# Patient Record
Sex: FEMALE | Race: WHITE | NOT HISPANIC OR LATINO | URBAN - METROPOLITAN AREA
[De-identification: names, ages, dates, MRNs, and addresses within clinical notes are randomized per-mention and may not be internally consistent; named-entity substitution may affect disease eponyms.]

---

## 2017-07-01 ENCOUNTER — OUTPATIENT (OUTPATIENT)
Dept: OUTPATIENT SERVICES | Facility: HOSPITAL | Age: 77
LOS: 1 days | End: 2017-07-01
Payer: MEDICAID

## 2017-07-01 DIAGNOSIS — Z98.89 OTHER SPECIFIED POSTPROCEDURAL STATES: Chronic | ICD-10-CM

## 2017-07-01 DIAGNOSIS — Z90.710 ACQUIRED ABSENCE OF BOTH CERVIX AND UTERUS: Chronic | ICD-10-CM

## 2017-07-01 DIAGNOSIS — Z98.49 CATARACT EXTRACTION STATUS, UNSPECIFIED EYE: Chronic | ICD-10-CM

## 2017-07-20 DIAGNOSIS — R69 ILLNESS, UNSPECIFIED: ICD-10-CM

## 2017-08-01 PROCEDURE — G9001: CPT

## 2018-11-12 ENCOUNTER — APPOINTMENT (OUTPATIENT)
Dept: GASTROENTEROLOGY | Facility: CLINIC | Age: 78
End: 2018-11-12
Payer: MEDICARE

## 2018-11-12 VITALS
HEART RATE: 66 BPM | OXYGEN SATURATION: 97 % | WEIGHT: 155 LBS | HEIGHT: 63 IN | TEMPERATURE: 98.7 F | BODY MASS INDEX: 27.46 KG/M2 | SYSTOLIC BLOOD PRESSURE: 150 MMHG | DIASTOLIC BLOOD PRESSURE: 90 MMHG

## 2018-11-12 DIAGNOSIS — Z86.010 PERSONAL HISTORY OF COLONIC POLYPS: ICD-10-CM

## 2018-11-12 DIAGNOSIS — Z86.39 PERSONAL HISTORY OF OTHER ENDOCRINE, NUTRITIONAL AND METABOLIC DISEASE: ICD-10-CM

## 2018-11-12 DIAGNOSIS — Z87.898 PERSONAL HISTORY OF OTHER SPECIFIED CONDITIONS: ICD-10-CM

## 2018-11-12 DIAGNOSIS — Z12.11 ENCOUNTER FOR SCREENING FOR MALIGNANT NEOPLASM OF COLON: ICD-10-CM

## 2018-11-12 DIAGNOSIS — K57.30 DIVERTICULOSIS OF LARGE INTESTINE W/OUT PERFORATION OR ABSCESS W/OUT BLEEDING: ICD-10-CM

## 2018-11-12 DIAGNOSIS — Z80.0 FAMILY HISTORY OF MALIGNANT NEOPLASM OF DIGESTIVE ORGANS: ICD-10-CM

## 2018-11-12 DIAGNOSIS — Z86.79 PERSONAL HISTORY OF OTHER DISEASES OF THE CIRCULATORY SYSTEM: ICD-10-CM

## 2018-11-12 DIAGNOSIS — R73.03 PREDIABETES.: ICD-10-CM

## 2018-11-12 PROCEDURE — 99203 OFFICE O/P NEW LOW 30 MIN: CPT

## 2018-11-12 RX ORDER — REPAGLINIDE 2 MG/1
2 TABLET ORAL
Refills: 0 | Status: ACTIVE | COMMUNITY

## 2018-11-12 RX ORDER — SODIUM SULFATE, POTASSIUM SULFATE, MAGNESIUM SULFATE 17.5; 3.13; 1.6 G/ML; G/ML; G/ML
17.5-3.13-1.6 SOLUTION, CONCENTRATE ORAL
Qty: 1 | Refills: 0 | Status: ACTIVE | COMMUNITY
Start: 2018-11-12 | End: 1900-01-01

## 2018-11-12 RX ORDER — ATENOLOL 25 MG/1
25 TABLET ORAL
Refills: 0 | Status: ACTIVE | COMMUNITY

## 2018-11-12 RX ORDER — HYDROCHLOROTHIAZIDE 25 MG/1
25 TABLET ORAL
Refills: 0 | Status: ACTIVE | COMMUNITY

## 2018-11-12 RX ORDER — VALSARTAN 160 MG/1
160 TABLET ORAL
Refills: 0 | Status: ACTIVE | COMMUNITY

## 2018-11-14 ENCOUNTER — APPOINTMENT (OUTPATIENT)
Dept: GASTROENTEROLOGY | Facility: CLINIC | Age: 78
End: 2018-11-14

## 2018-11-30 ENCOUNTER — APPOINTMENT (OUTPATIENT)
Dept: GASTROENTEROLOGY | Facility: AMBULATORY MEDICAL SERVICES | Age: 78
End: 2018-11-30
Payer: MEDICARE

## 2018-11-30 PROCEDURE — 45380 COLONOSCOPY AND BIOPSY: CPT

## 2018-12-04 ENCOUNTER — OTHER (OUTPATIENT)
Age: 78
End: 2018-12-04

## 2020-02-01 ENCOUNTER — OUTPATIENT (OUTPATIENT)
Dept: OUTPATIENT SERVICES | Facility: HOSPITAL | Age: 80
LOS: 1 days | End: 2020-02-01
Payer: MEDICARE

## 2020-02-01 DIAGNOSIS — Z98.89 OTHER SPECIFIED POSTPROCEDURAL STATES: Chronic | ICD-10-CM

## 2020-02-01 DIAGNOSIS — Z90.710 ACQUIRED ABSENCE OF BOTH CERVIX AND UTERUS: Chronic | ICD-10-CM

## 2020-02-01 DIAGNOSIS — Z98.49 CATARACT EXTRACTION STATUS, UNSPECIFIED EYE: Chronic | ICD-10-CM

## 2020-02-01 PROCEDURE — G9001: CPT

## 2020-02-10 ENCOUNTER — INPATIENT (INPATIENT)
Facility: HOSPITAL | Age: 80
LOS: 18 days | DRG: 64 | End: 2020-02-29
Attending: INTERNAL MEDICINE | Admitting: PSYCHIATRY & NEUROLOGY
Payer: MEDICARE

## 2020-02-10 ENCOUNTER — INPATIENT (INPATIENT)
Facility: HOSPITAL | Age: 80
LOS: 0 days | Discharge: HOME | DRG: 66 | End: 2020-02-10
Attending: HOSPITALIST | Admitting: HOSPITALIST
Payer: MEDICARE

## 2020-02-10 VITALS
DIASTOLIC BLOOD PRESSURE: 65 MMHG | RESPIRATION RATE: 20 BRPM | SYSTOLIC BLOOD PRESSURE: 155 MMHG | OXYGEN SATURATION: 99 % | HEART RATE: 100 BPM

## 2020-02-10 VITALS
WEIGHT: 156.53 LBS | RESPIRATION RATE: 16 BRPM | TEMPERATURE: 98 F | HEIGHT: 63 IN | DIASTOLIC BLOOD PRESSURE: 88 MMHG | OXYGEN SATURATION: 96 % | HEART RATE: 93 BPM | SYSTOLIC BLOOD PRESSURE: 195 MMHG

## 2020-02-10 VITALS — HEIGHT: 63 IN | WEIGHT: 156.09 LBS

## 2020-02-10 DIAGNOSIS — I63.312 CEREBRAL INFARCTION DUE TO THROMBOSIS OF LEFT MIDDLE CEREBRAL ARTERY: ICD-10-CM

## 2020-02-10 DIAGNOSIS — Z90.710 ACQUIRED ABSENCE OF BOTH CERVIX AND UTERUS: Chronic | ICD-10-CM

## 2020-02-10 DIAGNOSIS — Z98.89 OTHER SPECIFIED POSTPROCEDURAL STATES: Chronic | ICD-10-CM

## 2020-02-10 DIAGNOSIS — I10 ESSENTIAL (PRIMARY) HYPERTENSION: ICD-10-CM

## 2020-02-10 DIAGNOSIS — Z98.49 CATARACT EXTRACTION STATUS, UNSPECIFIED EYE: Chronic | ICD-10-CM

## 2020-02-10 DIAGNOSIS — E11.9 TYPE 2 DIABETES MELLITUS WITHOUT COMPLICATIONS: ICD-10-CM

## 2020-02-10 DIAGNOSIS — Z71.89 OTHER SPECIFIED COUNSELING: ICD-10-CM

## 2020-02-10 LAB
ALBUMIN SERPL ELPH-MCNC: 4.1 G/DL — SIGNIFICANT CHANGE UP (ref 3.3–5)
ALP SERPL-CCNC: 84 U/L — SIGNIFICANT CHANGE UP (ref 30–120)
ALT FLD-CCNC: 50 U/L DA — SIGNIFICANT CHANGE UP (ref 10–60)
ANION GAP SERPL CALC-SCNC: 14 MMOL/L — SIGNIFICANT CHANGE UP (ref 5–17)
APTT BLD: 28.2 SEC — LOW (ref 28.5–37)
AST SERPL-CCNC: 31 U/L — SIGNIFICANT CHANGE UP (ref 10–40)
BASE EXCESS BLDA CALC-SCNC: 3.5 MMOL/L — HIGH (ref -2–2)
BASOPHILS # BLD AUTO: 0.03 K/UL — SIGNIFICANT CHANGE UP (ref 0–0.2)
BASOPHILS NFR BLD AUTO: 0.2 % — SIGNIFICANT CHANGE UP (ref 0–2)
BILIRUB SERPL-MCNC: 0.6 MG/DL — SIGNIFICANT CHANGE UP (ref 0.2–1.2)
BLOOD GAS SOURCE: SIGNIFICANT CHANGE UP
BUN SERPL-MCNC: 24 MG/DL — HIGH (ref 7–23)
CALCIUM SERPL-MCNC: 9 MG/DL — SIGNIFICANT CHANGE UP (ref 8.4–10.5)
CHLORIDE SERPL-SCNC: 104 MMOL/L — SIGNIFICANT CHANGE UP (ref 96–108)
CO2 SERPL-SCNC: 23 MMOL/L — SIGNIFICANT CHANGE UP (ref 22–31)
CREAT SERPL-MCNC: 0.8 MG/DL — SIGNIFICANT CHANGE UP (ref 0.5–1.3)
EOSINOPHIL # BLD AUTO: 0 K/UL — SIGNIFICANT CHANGE UP (ref 0–0.5)
EOSINOPHIL NFR BLD AUTO: 0 % — SIGNIFICANT CHANGE UP (ref 0–6)
ETHANOL SERPL-MCNC: <3 MG/DL — SIGNIFICANT CHANGE UP (ref 0–3)
GAS PNL BLDV: SIGNIFICANT CHANGE UP
GLUCOSE BLDC GLUCOMTR-MCNC: 164 MG/DL — HIGH (ref 70–99)
GLUCOSE BLDC GLUCOMTR-MCNC: 208 MG/DL — HIGH (ref 70–99)
GLUCOSE BLDC GLUCOMTR-MCNC: 223 MG/DL — HIGH (ref 70–99)
GLUCOSE SERPL-MCNC: 210 MG/DL — HIGH (ref 70–99)
HCO3 BLDA-SCNC: 22 MMOL/L — SIGNIFICANT CHANGE UP (ref 21–29)
HCO3 BLDV-SCNC: 24 MMOL/L — SIGNIFICANT CHANGE UP (ref 21–29)
HCT VFR BLD CALC: 45.5 % — HIGH (ref 34.5–45)
HGB BLD-MCNC: 14.5 G/DL — SIGNIFICANT CHANGE UP (ref 11.5–15.5)
HOROWITZ INDEX BLDA+IHG-RTO: 50 — SIGNIFICANT CHANGE UP
IMM GRANULOCYTES NFR BLD AUTO: 0.3 % — SIGNIFICANT CHANGE UP (ref 0–1.5)
INR BLD: 1.05 RATIO — SIGNIFICANT CHANGE UP (ref 0.88–1.16)
LYMPHOCYTES # BLD AUTO: 0.98 K/UL — LOW (ref 1–3.3)
LYMPHOCYTES # BLD AUTO: 6.8 % — LOW (ref 13–44)
MCHC RBC-ENTMCNC: 28.1 PG — SIGNIFICANT CHANGE UP (ref 27–34)
MCHC RBC-ENTMCNC: 31.9 GM/DL — LOW (ref 32–36)
MCV RBC AUTO: 88.2 FL — SIGNIFICANT CHANGE UP (ref 80–100)
MONOCYTES # BLD AUTO: 0.39 K/UL — SIGNIFICANT CHANGE UP (ref 0–0.9)
MONOCYTES NFR BLD AUTO: 2.7 % — SIGNIFICANT CHANGE UP (ref 2–14)
NEUTROPHILS # BLD AUTO: 13 K/UL — HIGH (ref 1.8–7.4)
NEUTROPHILS NFR BLD AUTO: 90 % — HIGH (ref 43–77)
NRBC # BLD: 0 /100 WBCS — SIGNIFICANT CHANGE UP (ref 0–0)
PCO2 BLDA: 36 MMHG — SIGNIFICANT CHANGE UP (ref 32–46)
PCO2 BLDV: 39 MMHG — SIGNIFICANT CHANGE UP (ref 35–50)
PH BLD: 7.38 — SIGNIFICANT CHANGE UP (ref 7.35–7.45)
PH BLDV: 7.4 — SIGNIFICANT CHANGE UP (ref 7.35–7.45)
PLATELET # BLD AUTO: 206 K/UL — SIGNIFICANT CHANGE UP (ref 150–400)
PO2 BLDA: 108 MMHG — SIGNIFICANT CHANGE UP (ref 74–108)
PO2 BLDV: 32 MMHG — SIGNIFICANT CHANGE UP (ref 25–45)
POTASSIUM SERPL-MCNC: 3.6 MMOL/L — SIGNIFICANT CHANGE UP (ref 3.5–5.3)
POTASSIUM SERPL-SCNC: 3.6 MMOL/L — SIGNIFICANT CHANGE UP (ref 3.5–5.3)
PROT SERPL-MCNC: 8 G/DL — SIGNIFICANT CHANGE UP (ref 6–8.3)
PROTHROM AB SERPL-ACNC: 11.7 SEC — SIGNIFICANT CHANGE UP (ref 10–12.9)
RBC # BLD: 5.16 M/UL — SIGNIFICANT CHANGE UP (ref 3.8–5.2)
RBC # FLD: 12.9 % — SIGNIFICANT CHANGE UP (ref 10.3–14.5)
SAO2 % BLDA: 98 % — HIGH (ref 92–96)
SAO2 % BLDV: 59 % — LOW (ref 67–88)
SODIUM SERPL-SCNC: 141 MMOL/L — SIGNIFICANT CHANGE UP (ref 135–145)
T3 SERPL-MCNC: 111 NG/DL — SIGNIFICANT CHANGE UP (ref 80–200)
T4 AB SER-ACNC: 6.3 UG/DL — SIGNIFICANT CHANGE UP (ref 4.6–12)
TROPONIN I SERPL-MCNC: 0.01 NG/ML — LOW (ref 0.02–0.06)
TSH SERPL-MCNC: 0.32 UIU/ML — SIGNIFICANT CHANGE UP (ref 0.27–4.2)
WBC # BLD: 14.45 K/UL — HIGH (ref 3.8–10.5)
WBC # FLD AUTO: 14.45 K/UL — HIGH (ref 3.8–10.5)

## 2020-02-10 PROCEDURE — 82962 GLUCOSE BLOOD TEST: CPT

## 2020-02-10 PROCEDURE — 70498 CT ANGIOGRAPHY NECK: CPT | Mod: 26

## 2020-02-10 PROCEDURE — 99291 CRITICAL CARE FIRST HOUR: CPT | Mod: GC

## 2020-02-10 PROCEDURE — 70450 CT HEAD/BRAIN W/O DYE: CPT

## 2020-02-10 PROCEDURE — 84484 ASSAY OF TROPONIN QUANT: CPT

## 2020-02-10 PROCEDURE — 84480 ASSAY TRIIODOTHYRONINE (T3): CPT

## 2020-02-10 PROCEDURE — 71045 X-RAY EXAM CHEST 1 VIEW: CPT | Mod: 26

## 2020-02-10 PROCEDURE — 94002 VENT MGMT INPAT INIT DAY: CPT

## 2020-02-10 PROCEDURE — 84443 ASSAY THYROID STIM HORMONE: CPT

## 2020-02-10 PROCEDURE — 85027 COMPLETE CBC AUTOMATED: CPT

## 2020-02-10 PROCEDURE — 99291 CRITICAL CARE FIRST HOUR: CPT

## 2020-02-10 PROCEDURE — 84436 ASSAY OF TOTAL THYROXINE: CPT

## 2020-02-10 PROCEDURE — 80053 COMPREHEN METABOLIC PANEL: CPT

## 2020-02-10 PROCEDURE — 93880 EXTRACRANIAL BILAT STUDY: CPT | Mod: 26

## 2020-02-10 PROCEDURE — 36415 COLL VENOUS BLD VENIPUNCTURE: CPT

## 2020-02-10 PROCEDURE — 70496 CT ANGIOGRAPHY HEAD: CPT | Mod: 26

## 2020-02-10 PROCEDURE — 85610 PROTHROMBIN TIME: CPT

## 2020-02-10 PROCEDURE — 71045 X-RAY EXAM CHEST 1 VIEW: CPT

## 2020-02-10 PROCEDURE — 85730 THROMBOPLASTIN TIME PARTIAL: CPT

## 2020-02-10 PROCEDURE — 71045 X-RAY EXAM CHEST 1 VIEW: CPT | Mod: 26,77

## 2020-02-10 PROCEDURE — 80307 DRUG TEST PRSMV CHEM ANLYZR: CPT

## 2020-02-10 PROCEDURE — 70498 CT ANGIOGRAPHY NECK: CPT

## 2020-02-10 PROCEDURE — 99285 EMERGENCY DEPT VISIT HI MDM: CPT

## 2020-02-10 PROCEDURE — 70496 CT ANGIOGRAPHY HEAD: CPT

## 2020-02-10 PROCEDURE — 82803 BLOOD GASES ANY COMBINATION: CPT

## 2020-02-10 PROCEDURE — 93880 EXTRACRANIAL BILAT STUDY: CPT

## 2020-02-10 PROCEDURE — 70450 CT HEAD/BRAIN W/O DYE: CPT | Mod: 26,59

## 2020-02-10 RX ORDER — FENTANYL CITRATE 50 UG/ML
100 INJECTION INTRAVENOUS ONCE
Refills: 0 | Status: DISCONTINUED | OUTPATIENT
Start: 2020-02-10 | End: 2020-02-10

## 2020-02-10 RX ORDER — CHOLECALCIFEROL (VITAMIN D3) 125 MCG
1 CAPSULE ORAL
Qty: 0 | Refills: 0 | DISCHARGE

## 2020-02-10 RX ORDER — LABETALOL HCL 100 MG
10 TABLET ORAL EVERY 6 HOURS
Refills: 0 | Status: DISCONTINUED | OUTPATIENT
Start: 2020-02-10 | End: 2020-02-10

## 2020-02-10 RX ORDER — VALSARTAN 80 MG/1
1 TABLET ORAL
Qty: 0 | Refills: 0 | DISCHARGE

## 2020-02-10 RX ORDER — DEXMEDETOMIDINE HYDROCHLORIDE IN 0.9% SODIUM CHLORIDE 4 UG/ML
0.2 INJECTION INTRAVENOUS
Qty: 200 | Refills: 0 | Status: DISCONTINUED | OUTPATIENT
Start: 2020-02-10 | End: 2020-02-12

## 2020-02-10 RX ORDER — INSULIN LISPRO 100/ML
VIAL (ML) SUBCUTANEOUS
Refills: 0 | Status: DISCONTINUED | OUTPATIENT
Start: 2020-02-10 | End: 2020-02-10

## 2020-02-10 RX ORDER — ENOXAPARIN SODIUM 100 MG/ML
40 INJECTION SUBCUTANEOUS DAILY
Refills: 0 | Status: DISCONTINUED | OUTPATIENT
Start: 2020-02-10 | End: 2020-02-10

## 2020-02-10 RX ORDER — ETOMIDATE 2 MG/ML
20 INJECTION INTRAVENOUS ONCE
Refills: 0 | Status: COMPLETED | OUTPATIENT
Start: 2020-02-10 | End: 2020-02-10

## 2020-02-10 RX ORDER — METOPROLOL TARTRATE 50 MG
1 TABLET ORAL
Qty: 0 | Refills: 0 | DISCHARGE

## 2020-02-10 RX ORDER — DEXTROSE 50 % IN WATER 50 %
15 SYRINGE (ML) INTRAVENOUS ONCE
Refills: 0 | Status: DISCONTINUED | OUTPATIENT
Start: 2020-02-10 | End: 2020-02-10

## 2020-02-10 RX ORDER — DEXTROSE 50 % IN WATER 50 %
25 SYRINGE (ML) INTRAVENOUS ONCE
Refills: 0 | Status: DISCONTINUED | OUTPATIENT
Start: 2020-02-10 | End: 2020-02-10

## 2020-02-10 RX ORDER — HEPARIN SODIUM 5000 [USP'U]/ML
5000 INJECTION INTRAVENOUS; SUBCUTANEOUS EVERY 12 HOURS
Refills: 0 | Status: DISCONTINUED | OUTPATIENT
Start: 2020-02-11 | End: 2020-02-10

## 2020-02-10 RX ORDER — GLUCAGON INJECTION, SOLUTION 0.5 MG/.1ML
1 INJECTION, SOLUTION SUBCUTANEOUS ONCE
Refills: 0 | Status: DISCONTINUED | OUTPATIENT
Start: 2020-02-10 | End: 2020-02-10

## 2020-02-10 RX ORDER — SODIUM CHLORIDE 9 MG/ML
1000 INJECTION, SOLUTION INTRAVENOUS
Refills: 0 | Status: DISCONTINUED | OUTPATIENT
Start: 2020-02-10 | End: 2020-02-10

## 2020-02-10 RX ORDER — ASPIRIN/CALCIUM CARB/MAGNESIUM 324 MG
300 TABLET ORAL DAILY
Refills: 0 | Status: DISCONTINUED | OUTPATIENT
Start: 2020-02-10 | End: 2020-02-10

## 2020-02-10 RX ORDER — FENTANYL CITRATE 50 UG/ML
50 INJECTION INTRAVENOUS ONCE
Refills: 0 | Status: DISCONTINUED | OUTPATIENT
Start: 2020-02-10 | End: 2020-02-10

## 2020-02-10 RX ORDER — CHLORHEXIDINE GLUCONATE 213 G/1000ML
1 SOLUTION TOPICAL
Refills: 0 | Status: DISCONTINUED | OUTPATIENT
Start: 2020-02-10 | End: 2020-02-15

## 2020-02-10 RX ORDER — LABETALOL HCL 100 MG
10 TABLET ORAL ONCE
Refills: 0 | Status: COMPLETED | OUTPATIENT
Start: 2020-02-10 | End: 2020-02-10

## 2020-02-10 RX ORDER — ACETAMINOPHEN 500 MG
650 TABLET ORAL EVERY 6 HOURS
Refills: 0 | Status: DISCONTINUED | OUTPATIENT
Start: 2020-02-10 | End: 2020-02-10

## 2020-02-10 RX ORDER — FENTANYL CITRATE 50 UG/ML
50 INJECTION INTRAVENOUS
Refills: 0 | Status: DISCONTINUED | OUTPATIENT
Start: 2020-02-10 | End: 2020-02-10

## 2020-02-10 RX ORDER — SODIUM CHLORIDE 9 MG/ML
1000 INJECTION INTRAMUSCULAR; INTRAVENOUS; SUBCUTANEOUS
Refills: 0 | Status: DISCONTINUED | OUTPATIENT
Start: 2020-02-10 | End: 2020-02-10

## 2020-02-10 RX ORDER — CHLORHEXIDINE GLUCONATE 213 G/1000ML
15 SOLUTION TOPICAL EVERY 12 HOURS
Refills: 0 | Status: DISCONTINUED | OUTPATIENT
Start: 2020-02-10 | End: 2020-02-16

## 2020-02-10 RX ORDER — DEXTROSE 50 % IN WATER 50 %
12.5 SYRINGE (ML) INTRAVENOUS ONCE
Refills: 0 | Status: DISCONTINUED | OUTPATIENT
Start: 2020-02-10 | End: 2020-02-10

## 2020-02-10 RX ORDER — REPAGLINIDE 1 MG/1
1 TABLET ORAL
Qty: 0 | Refills: 0 | DISCHARGE

## 2020-02-10 RX ADMIN — Medication 4: at 19:08

## 2020-02-10 RX ADMIN — Medication 10 MILLIGRAM(S): at 17:49

## 2020-02-10 RX ADMIN — Medication 300 MILLIGRAM(S): at 19:08

## 2020-02-10 RX ADMIN — FENTANYL CITRATE 50 MICROGRAM(S): 50 INJECTION INTRAVENOUS at 22:40

## 2020-02-10 RX ADMIN — ENOXAPARIN SODIUM 40 MILLIGRAM(S): 100 INJECTION SUBCUTANEOUS at 17:49

## 2020-02-10 RX ADMIN — FENTANYL CITRATE 50 MICROGRAM(S): 50 INJECTION INTRAVENOUS at 22:25

## 2020-02-10 RX ADMIN — ETOMIDATE 20 MILLIGRAM(S): 2 INJECTION INTRAVENOUS at 22:13

## 2020-02-10 RX ADMIN — SODIUM CHLORIDE 50 MILLILITER(S): 9 INJECTION INTRAMUSCULAR; INTRAVENOUS; SUBCUTANEOUS at 17:43

## 2020-02-10 NOTE — PROGRESS NOTE ADULT - ASSESSMENT
88 yo F with  PMH of DM2 HTN ,HLD, S/P cholecystectomy hip surg, sent to ER at  for episode of nonresponsiveness. CTA STROKE protocol significant for acute left MCA infarction with hyperdense left MCA sign. Now being transferred to SPCU for higher level of care      Acute left sided MCA stroke  Out of TPA window  Case discussed with Critical Care attending and Neurology. High risk of hemorrhagic deterioration.  At this time  Frequent neuro checks q1hr  Iv fluids with only normal saline  speech swallow when responsive  Tele  Echo with bubble  PT eval  Carotid duplex  Will repeat CT head in the am to assess for Stroke evolution  Low low threshold for repeat CT head     Problem/Plan - 2:  ·  Problem: Diabetes.  Plan: ssi.      Problem/Plan - 3:  ·  Problem: Hypertension.  Plan: labetalol prn , keep bp around 180.      Problem/Plan - 4:  ·  Problem: Goals of care, counseling/discussion.  Plan: discussed with daughter, full code for now, poor prognosis discussed.       Critical Care sara   ~Greater than 50 minute spent with critical care on  patient encounter, activities included direct patient care, counceling and or coordinating care , reviewing notes, lab data/imaging, and discussion with multidiscplinary team    Patient is high risk of neurlogical deteroriation and hemorrhagic transsformation and will need to be in the SPCU for frequent neuro checks  patient has low threshold for Repeat CT head.  will repeat CT in 24 hours  out of TPA window 88 yo F with  PMH of DM2 HTN ,HLD, S/P cholecystectomy hip surg, sent to ER at  for episode of nonresponsiveness. CTA STROKE protocol significant for acute left MCA infarction with hyperdense left MCA sign. Now being transferred to SPCU for higher level of care      Acute left sided MCA stroke  Out of TPA window  Case discussed with Critical Care attending and Neurology. High risk of hemorrhagic deterioration.  At this time  Frequent neuro checks q1hr  Iv fluids with only normal saline  speech swallow when responsive  Tele  Echo with bubble  PT eval  Carotid duplex  CTA now  Low low threshold for repeat CT head     Problem/Plan - 2:  ·  Problem: Diabetes.  Plan: ssi.      Problem/Plan - 3:  ·  Problem: Hypertension.  Plan: labetalol prn , keep bp around 180.      Problem/Plan - 4:  ·  Problem: Goals of care, counseling/discussion.  Plan: discussed with daughter, full code for now, poor prognosis discussed.     Called [place] ED via [facility]. Spoke to DR. Hi.in Neuro ICU. wasn't impressed with CT findings. did not accept transfer at this time.    Called [facility];  spoke to vascular neurology Dr. Pritchard.  Discussed their are no beds at this time in hospitals  or [place]. will need [facility] icu. transfer    Medical director contacted via CTC. reviewing case now  will call  dr maria with update  Critical Care time     ~Greater than 50 minute spent with critical care on  patient encounter, activities included direct patient care, counceling and or coordinating care , reviewing notes, lab data/imaging, and discussion with multidiscplinary team    Patient is high risk of neurological deteroriation and hemorrhagic transformation and will need to be in the SPCU for frequent neuro checks  followup CTA head and neck    out of TPA window

## 2020-02-10 NOTE — H&P ADULT - NSICDXFAMILYHX_GEN_ALL_CORE_FT
FAMILY HISTORY:  Family history of borderline diabetes mellitus  Family history of CVA    Sibling  Still living? No  Family history of colon cancer, Age at diagnosis: Age Unknown

## 2020-02-10 NOTE — H&P ADULT - NSICDXPASTMEDICALHX_GEN_ALL_CORE_FT
PAST MEDICAL HISTORY:  Chronic bronchitis, simple     Diabetes fbs  104-124    Dyslipidemia     Gallbladder polyp     Hypertension

## 2020-02-10 NOTE — ED ADULT NURSE NOTE - OBJECTIVE STATEMENT
Presents to ED via amb from home. Pt's daughter last spoke with pt @ 9pm last night. Daughter unable to reach her today. Left work & arrived at her apt @ 4pm. Found her on the floor next to bed unresponsive. Upon arrival to ED pt is unresponsive with minimal movement in left arm- all other extremities are flaccid. Forehead reddened from fall? Daughter states she didn't have that last night. Nose red & scaly- basal cell carcinoma

## 2020-02-10 NOTE — PROVIDER CONTACT NOTE (EICU) - RECOMMENDATIONS
- okay to use etomidate +/- propofol  - recommended keeping BP goal <185/110   - recommended ABG post intubation prior to transfer

## 2020-02-10 NOTE — H&P ADULT - NSICDXPASTSURGICALHX_GEN_ALL_CORE_FT
PAST SURGICAL HISTORY:  S/P breast biopsy, right 1985    S/P cataract extraction, unspecified laterality bilateral    S/P colonoscopy 2013    S/P hysterectomy with oophorectomy due to myoma 1994

## 2020-02-10 NOTE — H&P ADULT - HISTORY OF PRESENT ILLNESS
89 yr old Female who lives alone in [place] and self reliant with PMH of DM2 HTN ,HLD, S/P cholecystectomy hip surg, was last seen by daughter at 4 pm on 2/9, and she talked to mother at 9.30 pm , today pt was not responding and daughter found her unresponsive at 4 pm,and adalgisa in to Ed where unresponsive, eyes closed, not moving any extremeties , does not respond to voice commands, slight movement of rt leg ,  < from: CT Brain Stroke Protocol (02.10.20 @ 16:58) >  Acute left MCA distribution infarction with an associated hyperdense left MCA sign. No hemorrhagic transformation.    < end of copied text >

## 2020-02-10 NOTE — H&P ADULT - ATTENDING COMMENTS
I have discussed care plan HCP,expressed understanding of problems treatment and their effect and side effects, alternatives in detail,I have asked if they have any questions and concerns and appropriately addressed them to best of my ability  Reviewed all diagonostic tests, lab results and drug drug interactions, and medications  80 minutes spent on this visit, 50% visit time spent in care co-ordination with other attendings and counselling patient

## 2020-02-10 NOTE — ED PROVIDER NOTE - PSH
S/P breast biopsy, right  1985  S/P cataract extraction, unspecified laterality  bilateral  S/P colonoscopy  2013  S/P hysterectomy with oophorectomy  due to myoma 1994

## 2020-02-10 NOTE — CHART NOTE - NSCHARTNOTEFT_GEN_A_CORE
Called to bedside to assist with intubation   Pt has left sided MCA stroke  out of window for TPA as symptoms started yesterday   currently GCS 4  Pt high risk for airway compromise during transport   Decision was made to intubate prior to transport   Case d/w Dr. Ibarra who spoke with Dr. Valdes   Intubation performed with glidescope (see procedure note)  started on low dose propofol infusion for sedation as patient moving left side   fentanyl pushes prn ordered   D/w EMS goal to keep    cautious with sedation related hypotension   post x-ray and abg reviewed  Case d/w eICU and family who agrees Called to bedside to assist with intubation   Pt with left sided MCA stroke, now with expanding edema, to be transferred to [facility]  out of window for TPA as unknown when symptoms began (some time yesterday last known normal)  currently GCS 4  Pt high risk for airway compromise during transport   Decision was made to intubate prior to transport   Case d/w Dr. Ibarra, who spoke with Dr. Valdes and eICU  Intubation performed with glidescope (see procedure note)  started on low dose propofol infusion for sedation as patient moving left side   fentanyl pushes prn ordered   D/w EMS goal to keep    cautious with sedation related hypotension   post x-ray and abg reviewed  Updated family who agrees Called to bedside to assist with intubation   Pt with left sided MCA stroke  out of window for TPA as unknown when symptoms began (some time yesterday last known normal)  CTA head now with maturing left MCA infarct, expanding edema, mass effect   To be transferred to [facility]  currently GCS 4  Pt high risk for airway compromise during transport   Decision was made to intubate prior to transport   Case d/w Dr. Ibarra, who spoke with Dr. Valdes and eICU  Intubation performed with glidescope (see procedure note)  started on low dose propofol infusion for sedation as patient moving left side   fentanyl pushes prn ordered   D/w EMS goal to keep    cautious with sedation related hypotension   post x-ray and abg reviewed  Updated family who agrees

## 2020-02-10 NOTE — ED PROVIDER NOTE - PROGRESS NOTE DETAILS
Gifty COKER for ED attending, Dr. Menendez : Radiologist reports CT shows left MCA infarct, no bleed.

## 2020-02-10 NOTE — H&P ADULT - HISTORY OF PRESENT ILLNESS
79 year old female with PMH of HTN, DM, HLD presents to the [facility] ED as a stroke transfer. She initially presented to [facility]. Her daughter found her unresponsive at 4pm with Right sided weakness. LKN was 9:30 pm on 2/9. At baseline, she is independent and lives alone. At [facility], she was found to have right arm plegia and was globally aphasic. CT head revealed dense L MCA sign and acute L MCA infarction. CTA h/n revealed a L M1 occlusion. At [facility], her GCS fell from 8 to 4 and patient was intubated. No TPA given and no thrombectomy offered due to outside window.  NIHSS 19. MRS 0.

## 2020-02-10 NOTE — H&P ADULT - NSICDXPASTMEDICALHX_GEN_ALL_CORE_FT
PAST MEDICAL HISTORY:  Basal cell carcinoma nose    Chronic bronchitis, simple     Diabetes fbs  104-124    Dyslipidemia     Gallbladder polyp     Hypertension

## 2020-02-10 NOTE — H&P ADULT - ASSESSMENT
78 year old female presented with unresponsiveness at home. At [place] she was found to have R hemiparesis and global aphasia, consistent with a L MCA syndrome. Her CT head reveals an acute L MCA infarction. 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 Not a candidate for IV tPA due to out of window.     PLAN:  -stroke neurology consulted- Not a candidate for thrombectomy given large core infarct on CT head.  -stroke work-up/core measures (HgA1c, lipid panel, TTE w/ bubble study)  -repeat CT head pending  -ASA for secondary stroke prevention if no hemorrhage on CT head  -start atorvastatin 80mg  -video EEG to r/o subclinical seizures  -wean to extubate, CPAP as tolerated  -SBP goal 120-180  -trend CK since found down for unknown time  -MRI brain w/ contrast at some point once stable   - SCDs, start SQL if CT head shows no heme  -Right wrist X-ray ordered to r/o fracture (swelling and ecchymosis)

## 2020-02-10 NOTE — H&P ADULT - PROBLEM SELECTOR PLAN 1
Iv fluids, neuro consult, speech swallow when responsive, npo, neuro checks, upgrade to spcu if needed intensive care consult

## 2020-02-10 NOTE — ED ADULT NURSE NOTE - NSIMPLEMENTINTERV_GEN_ALL_ED
Implemented All Fall Risk Interventions:  [place] to call system. Call bell, personal items and telephone within reach. Instruct patient to call for assistance. Room bathroom lighting operational. Non-slip footwear when patient is off stretcher. Physically safe environment: no spills, clutter or unnecessary equipment. Stretcher in lowest position, wheels locked, appropriate side rails in place. Provide visual cue, wrist band, yellow gown, etc. Monitor gait and stability. Monitor for mental status changes and reorient to person, place, and time. Review medications for side effects contributing to fall risk. Reinforce activity limits and safety measures with patient and family.

## 2020-02-10 NOTE — ED PROVIDER NOTE - OBJECTIVE STATEMENT
79F with hx of HTN / HLD presents today after found down. Last known normal was 2/9 4pm. Today, was found down 79F with hx of HTN / HLD presents today after found down. Last known normal was  4pm. Today, was found down    Attendinyo female presents with CVA as transfer from other hospital.  pt intubated prior to arrival.  was found down today by family.

## 2020-02-10 NOTE — ED PROVIDER NOTE - CLINICAL SUMMARY MEDICAL DECISION MAKING FREE TEXT BOX
massive cva at outside hospital.  will consult neuro immediately for disposition and further management.  will admit to ICU.

## 2020-02-10 NOTE — ED PROVIDER NOTE - PMH
Chronic bronchitis, simple    Diabetes  fbs  104-124  Dyslipidemia    Gallbladder polyp    Hypertension

## 2020-02-10 NOTE — CONSULT NOTE ADULT - ASSESSMENT
Impression: She presented with unresponsiveness at home. At [place] she was found to have R hemiparesis and global aphasia, consistent with a L MCA syndrome. Her CT head reveals an acute L MCA infarction. 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      She was not a candidate for IV tPA because she was out of the window.  She was not a candidate for thrombectomy given large core infarct on CT head.    Plan:  - Aspirin for secondary stroke prevention  - Agree with pharmacological DVT prophylaxis   - Atorvastatin 80 mg at bedtime once able to pass the swallow evaluation or enteral access is established; titrate the dose according to LDL  - HbA1C and LDL  - Permissive HTN up to 220/110 mmHg for first 48-72 hours from symptom onset followed by gradual normotension  - repeat CT head in AM 2/11  - MRI brain w/o contrast to be considered once patient is stable and depending upon GOC discussion  - TTE with bubble study and continue with telemetry to screen for possible cardiac source of embolism  - loop recorder depending upon hospital course  - GOC discussion

## 2020-02-10 NOTE — H&P ADULT - NSICDXFAMILYHX_GEN_ALL_CORE_FT
FAMILY HISTORY:  Father  Still living? Unknown  Family history of borderline diabetes mellitus, Age at diagnosis: Age Unknown    Mother  Still living? Unknown  Family history of CVA, Age at diagnosis: Age Unknown    Sibling  Still living? No  Family history of colon cancer, Age at diagnosis: Age Unknown

## 2020-02-10 NOTE — CHART NOTE - NSCHARTNOTEFT_GEN_A_CORE
Patient accepted to [place], possibly to stroke unit.  Accepted by Dr. Oliveira at [place].  Family aware and agrees.

## 2020-02-10 NOTE — CONSULT NOTE ADULT - ASSESSMENT
Seen for unresponsiveness.  Has right sided weakness and global aphasia.  NIHSS couldn't be assessed accurately.  CT Head showed -  Acute left MCA distribution infarction with an associated hyperdense left MCA sign. No hemorrhagic transformation.  Not a tPa candidate  - Last well  known is last night. CVA already shown on CT Head  Admit to SPCU.   mg OH  CTA head and neck.   Although there is Dense MCA sign on left MCA. Pt is NOT a candidate for any intervention as Infract is already seen.  MRI Brain/Carotid doppler/2D Echo.  Lipid panel/HbA1c.  Failed dysphagia eval in ED. NPO  PT Eval/Speech and swallowing eval.  D/w ED physician Dr. Menendez.  D/w daughter/grand daughter at bedside. Questions answered.  Would continue to follow.

## 2020-02-10 NOTE — PROVIDER CONTACT NOTE (EICU) - SITUATION
Patient with large MCA stroke, currently in [place] ED, GCS of 4, being transferred to [facility].  ED at [place] recommending intubation for safe transport

## 2020-02-10 NOTE — ED ADULT NURSE NOTE - CAS EDN DISCHARGE ASSESSMENT
Alert and oriented to person, place and time/Patient baseline mental status intubated, vent/Patient baseline mental status

## 2020-02-10 NOTE — ED ADULT NURSE NOTE - NSIMPLEMENTINTERV_GEN_ALL_ED
Implemented All Fall with Harm Risk Interventions:  [place] to call system. Call bell, personal items and telephone within reach. Instruct patient to call for assistance. Room bathroom lighting operational. Non-slip footwear when patient is off stretcher. Physically safe environment: no spills, clutter or unnecessary equipment. Stretcher in lowest position, wheels locked, appropriate side rails in place. Provide visual cue, wrist band, yellow gown, etc. Monitor gait and stability. Monitor for mental status changes and reorient to person, place, and time. Review medications for side effects contributing to fall risk. Reinforce activity limits and safety measures with patient and family. Provide visual clues: red socks.

## 2020-02-10 NOTE — ED PROVIDER NOTE - PMH
Basal cell carcinoma  nose  Chronic bronchitis, simple    Diabetes  fbs  104-124  Dyslipidemia    Gallbladder polyp    Hypertension

## 2020-02-10 NOTE — CONSULT NOTE ADULT - SUBJECTIVE AND OBJECTIVE BOX
Date/Time Patient Seen:  		  Referring MD:   Data Reviewed	       Patient is a 79y old  Female who presents with a chief complaint of unresponsive (10 Feb 2020 17:20)      Subjective/HPI    in bed  seen and examined  vs and meds reviewed  labs reviewed  er provider note reviewed  h and p reviewed  spoke with Dtr    originally from [place]  lives alone  last seen in usual state of health 9 30 pm last night    DM   HTN  follows with Dr. Box = PCP    History and Physical:    Sean Coma Scale:    Sean Coma Scale (Adult):   · GCS Eye	(E1) none  · GCS Motor	(M1) none  · GCS Verbal	(V1) none  · GCS Score	3    Source of Information	Chart(s), Child  Outpatient Providers	Dr Box       History of Present Illness:  Reason for Admission: unresponsive  History of Present Illness:   89 yr old Female who lives alone in [place] and self reliant with PMH of DM2 HTN ,HLD, S/P cholecystectomy hip surg, was last seen by daughter at 4 pm on 2/9, and she talked to mother at 9.30 pm , today pt was not responding and daughter found her unresponsive at 4 pm,and adalgisa in to Ed where unresponsive, eyes closed, not moving any extremeties , does not respond to voice commands, slight movement of rt leg ,  < from: CT Brain Stroke Protocol (02.10.20 @ 16:58) >      ED Provider Note [Charted Location: [place]  ED] [Authored: 10-Feb-2020 17:05]- for Visit: [id number], Incomplete, Entered (Source: 0), Unsigned, General    HISTORY OF PRESENT ILLNESS:   · Chief Complaint: The patient is a 79y Female complaining of  · Unable to Obtain: Unresponsive  · Details: Unattainable due to pt unresponsive.  · HPI Objective Statement: 78 y/o female with a PMhx of DM, HTN, HLD presents to the ED bib EMS unresponsive. Pt was last known well yesterday ~9:30pm when daughter spoke with pt. Today daughter did not hear from pt so went to go visit her, arrived at pt's home and found pt on the floor unresponsive.  PCP: Isauro    PAST MEDICAL/SURGICAL/FAMILY/SOCIAL HISTORY:    Past Medical History:  Chronic bronchitis, simple    Diabetes  fbs  104-124  Dyslipidemia    Gallbladder polyp    Hypertension.     Past Surgical History:  S/P breast biopsy, right  1985  S/P cataract extraction, unspecified laterality  bilateral  S/P colonoscopy  2013  S/P hysterectomy with oophorectomy  due to myoma 1994.    · Social Concerns: None     Tobacco Usage:  · Tobacco Usage	Unknown if ever smoked       PAST MEDICAL & SURGICAL HISTORY:  Gallbladder polyp  Chronic bronchitis, simple  Dyslipidemia  Diabetes: fbs  104-124  Hypertension  S/P colonoscopy: 2013  S/P cataract extraction, unspecified laterality: bilateral  S/P hysterectomy with oophorectomy: due to myoma 1994  S/P breast biopsy, right: 1985        Medication list         MEDICATIONS  (STANDING):  dextrose 5%. 1000 milliLiter(s) (50 mL/Hr) IV Continuous <Continuous>  dextrose 50% Injectable 12.5 Gram(s) IV Push once  dextrose 50% Injectable 25 Gram(s) IV Push once  dextrose 50% Injectable 25 Gram(s) IV Push once  enoxaparin Injectable 40 milliGRAM(s) SubCutaneous daily  insulin lispro (HumaLOG) corrective regimen sliding scale   SubCutaneous three times a day before meals  labetalol Injectable 10 milliGRAM(s) IV Push once  sodium chloride 0.9%. 1000 milliLiter(s) (50 mL/Hr) IV Continuous <Continuous>    MEDICATIONS  (PRN):  dextrose 40% Gel 15 Gram(s) Oral once PRN Blood Glucose LESS THAN 70 milliGRAM(s)/deciliter  glucagon  Injectable 1 milliGRAM(s) IntraMuscular once PRN Glucose LESS THAN 70 milligrams/deciliter  labetalol Injectable 10 milliGRAM(s) IV Push every 6 hours PRN SBP>190 or DBP>110         Vitals log        ICU Vital Signs Last 24 Hrs  T(C): 36.7 (10 Feb 2020 16:50), Max: 36.7 (10 Feb 2020 16:50)  T(F): 98.1 (10 Feb 2020 16:50), Max: 98.1 (10 Feb 2020 16:50)  HR: 93 (10 Feb 2020 16:50) (93 - 93)  BP: 195/88 (10 Feb 2020 16:50) (195/88 - 195/88)  BP(mean): --  ABP: --  ABP(mean): --  RR: 16 (10 Feb 2020 16:50) (16 - 16)  SpO2: 96% (10 Feb 2020 16:50) (96% - 96%)           Input and Output:  I&O's Detail      Lab Data                  Review of Systems	  altered    Objective     Physical Examination    heart s1s2  lung dec BS  abd soft  head at  head nc      Pertinent Lab findings & Imaging      Michael:  NO   Adequate UO     I&O's Detail           Discussed with:     Cultures:	        Radiology      ct head - left MCA infarct

## 2020-02-10 NOTE — PROCEDURE NOTE - ADDITIONAL PROCEDURE DETAILS
1st attempt without sedation as patient GCS 4 and minimally responsive   cords visualized but patient awaking, intubation aborted   2nd attempt s/p 20mg etomidate, pt now sedated   grade 1 view of cords visualized, passed tube through cords, + end tidal co2, vapor in tube seen , b/l breath sound equal  cxr confirmed placement     diagnosis   1. Left MCA infarct  2. AMS    Procedure done independent of critical care time

## 2020-02-10 NOTE — H&P ADULT - ASSESSMENT
89 yr old Female who lives alone in [place] and self reliant with PMH of DM2 HTN ,HLD, S/P cholecystectomy hip surg, was last seen by daughter at 4 pm on 2/9, and she talked to mother at 9.30 pm , today pt was not responding and daughter found her unresponsive at 4 pm,and adalgisa in to Ed where unresponsive, eyes closed, not moving any extremeties , does not respond to voice commands, slight movement of rt leg ,  < from: CT Brain Stroke Protocol (02.10.20 @ 16:58) >  Acute left MCA distribution infarction with an associated hyperdense left MCA sign. No hemorrhagic transformation.    < end of copied text >

## 2020-02-10 NOTE — ED PROVIDER NOTE - CARE PLAN
Principal Discharge DX:	Cerebrovascular accident (CVA) due to thrombosis of left middle cerebral artery

## 2020-02-10 NOTE — ED ADULT NURSE NOTE - PMH
Chronic bronchitis, simple    Diabetes  fbs  104-124  Dyslipidemia    Gallbladder polyp    Hypertension Basal cell carcinoma  nose  Chronic bronchitis, simple    Diabetes  fbs  104-124  Dyslipidemia    Gallbladder polyp    Hypertension

## 2020-02-10 NOTE — H&P ADULT - NSHPPHYSICALEXAM_GEN_ALL_CORE
neuro: no apparent distress, intubated, no eye opening, no BTT, PERRL, no gaze deviation, no following commands, +cough/gag, overbreathes ventilator, Left upper extremity localizing briskly, Left lower extremity WDs briskly, Right upper plegic and right lower extremity TF.  Right wrist with mild edema and small bruise

## 2020-02-10 NOTE — ED PROVIDER NOTE - ENMT, MLM
Airway patent, Nasal mucosa clear. Mouth with normal mucosa. Throat has no vesicles, no oropharyngeal exudates and uvula is midline.  red area on tip of nose from prior skin CA surgery

## 2020-02-10 NOTE — ED PROVIDER NOTE - OBJECTIVE STATEMENT
80 y/o female with a PMhx of DM, HTN, HLD presents to the ED bib EMS unresponsive. Pt was last known well yesterday ~9:30pm when daughter spoke with pt. Today daughter did not hear from pt so went to go visit her, arrived at pt's home and found pt on the floor unresponsive.  PCP: Isauro

## 2020-02-10 NOTE — CONSULT NOTE ADULT - PROBLEM SELECTOR RECOMMENDATION 9
cva - left MCA - significant infarct - insult -   TTE and carotid dopplers  Neuro eval pending  keep SBP > 130 mm - prevent watershed ischemia  I and O  IVF  BP control - May use IV Labetolol or Hydralazine - pulse ox and tele monitor  NPO - swallow eval in am -   Asp prec - HOB elev - assist with ADL - fall prec - monitor for needs  outside the window for t-PA - discussed with Daughter  DM care and management - HgbA1c  TFT and U tox   serial labs and clinical assessment  CT head in Am - repeat -   at risk for Hemorrhagic conversion - baseline high SBP  discussed care management and prognosis with Daughter at the bedside -   will follow  will go to SPCU

## 2020-02-10 NOTE — PROGRESS NOTE ADULT - SUBJECTIVE AND OBJECTIVE BOX
89 yr old Female who lives alone in [place] and self reliant with PMH of DM2 HTN ,HLD, S/P cholecystectomy hip surg, was last seen by daughter at 4 pm on 2/9, and she talked to mother at 9.30 pm , today pt was not responding and daughter found her unresponsive at 4 pm,and adalgisa in to Ed where unresponsive, eyes closed, not moving any extremeties , does not respond to voice commands, slight movement of rt leg       ER course    Patient hypertensive        ~Review of Systems:  General:denies fever chills, headache, weakness  HEENT: denies blurry vision,diffculty swallowing, difficulty hearing, tinnitus  Cardiovascular: denies chest pain  ,palpitations  Pulmonary:denies shortness of breath, cough, wheezing, hemoptysis  Gastrointestinal: denies abdominal pain, constipation, diarrhea,nausea , vomiting, hematochezia  : denies hematuria, dysuria, or incontinence  Neurological: denies weakness, numbness , tingling, dizziness, tremors  MSK: denies muscle pain, difficulty ambulating, swelling, back pain  skin: denies skin rash, itching, burning, or  skin lesions  Psychiatrical: denies mood disturbances, anxierty, feeling depressed, depression , or difficulty sleeping      ~Objective:  Vitals  T(C): 36.7 (02-10-20 @ 16:50), Max: 36.7 (02-10-20 @ 16:50)  HR: 93 (02-10-20 @ 16:50) (93 - 93)  BP: 195/88 (02-10-20 @ 16:50) (195/88 - 195/88)  RR: 16 (02-10-20 @ 16:50) (16 - 16)  SpO2: 96% (02-10-20 @ 16:50) (96% - 96%)    Physical Exam:  General: comfortable, no acute distress, well nourished  HEENT: Atraumatic, no LAD, trachea midline, PERRLA  Cardiovascular: normal s1s2, no murmurs, gallops or fricition rubs  Pulmonary: clear to ausculation Bilaterally, no wheezing , rhonchi  Gastrointestinal: soft non tender non distended, no masses felt, no organomegally  Muscloskeletal: no lower extremity edema, intact bilateral lower extremity pulses  Neurological: CN II-12 intact. No focal weakness  Psychiatrical: normal mood, cooperative  SKIN: no rash, lesions or ulcers    ~  Labs:                          14.5   14.45 )-----------( 206      ( 10 Feb 2020 17:46 )             45.5     02-10    141  |  104  |  24<H>  ----------------------------<  210<H>  3.6   |  23  |  0.80    Ca    9.0      10 Feb 2020 17:46    TPro  8.0  /  Alb  4.1  /  TBili  0.6  /  DBili  x   /  AST  31  /  ALT  50  /  AlkPhos  84  02-10    LIVER FUNCTIONS - ( 10 Feb 2020 17:46 )  Alb: 4.1 g/dL / Pro: 8.0 g/dL / ALK PHOS: 84 U/L / ALT: 50 U/L DA / AST: 31 U/L / GGT: x           PT/INR - ( 10 Feb 2020 17:46 )   PT: 11.7 sec;   INR: 1.05 ratio         PTT - ( 10 Feb 2020 17:46 )  PTT:28.2 sec      Active Medications  MEDICATIONS  (STANDING):  dextrose 5%. 1000 milliLiter(s) (50 mL/Hr) IV Continuous <Continuous>  dextrose 50% Injectable 12.5 Gram(s) IV Push once  dextrose 50% Injectable 25 Gram(s) IV Push once  dextrose 50% Injectable 25 Gram(s) IV Push once  heparin  Injectable 5000 Unit(s) SubCutaneous every 12 hours  insulin lispro (HumaLOG) corrective regimen sliding scale   SubCutaneous three times a day before meals  sodium chloride 0.9%. 1000 milliLiter(s) (50 mL/Hr) IV Continuous <Continuous>    MEDICATIONS  (PRN):  acetaminophen  Suppository .. 650 milliGRAM(s) Rectal every 6 hours PRN Temp greater or equal to 38C (100.4F), Mild Pain (1 - 3)  bisacodyl Suppository 10 milliGRAM(s) Rectal daily PRN Constipation  dextrose 40% Gel 15 Gram(s) Oral once PRN Blood Glucose LESS THAN 70 milliGRAM(s)/deciliter  glucagon  Injectable 1 milliGRAM(s) IntraMuscular once PRN Glucose LESS THAN 70 milligrams/deciliter  labetalol Injectable 10 milliGRAM(s) IV Push every 6 hours PRN SBP>190 or DBP>110 89 yr old Female who lives alone in [place] and self reliant with PMH of DM2 HTN ,HLD, S/P cholecystectomy hip surg, was last seen by daughter at 4 pm on 2/9, and she talked to mother at 9.30 pm , today pt was not responding and daughter found her unresponsive at 4 pm,and adalgisa in to Ed where unresponsive, eyes closed, not moving any extremeties , does not respond to voice commands, slight movement of rt leg       ER course.  Patient hypertensive afebrile. white count of  14.5. elevated sugar. CT head + left MCA stroke + MCA sign  Neurology and Critical Care called. recommended ICU admisssion      Review of Systems:  unable to assess.    Objective:  Vitals  T(C): 36.7 (02-10-20 @ 16:50), Max: 36.7 (02-10-20 @ 16:50)  HR: 93 (02-10-20 @ 16:50) (93 - 93)  BP: 195/88 (02-10-20 @ 16:50) (195/88 - 195/88)  RR: 16 (02-10-20 @ 16:50) (16 - 16)  SpO2: 96% (02-10-20 @ 16:50) (96% - 96%)    Physical Exam:  General: comfortable, no acute distress, well nourished, a  HEENT: Atraumatic, no LAD, trachea midline, PERRLA  Cardiovascular: normal s1s2, no murmurs, gallops or fricition rubs  Pulmonary: clear to ausculation Bilaterally, no wheezing , rhonchi  Gastrointestinal: soft non tender non distended, no masses felt, no organomegally  Muscloskeletal: no lower extremity edema, intact bilateral lower extremity pulses  Neurological: ON MY EXAM: patient not openingn eyes, aphasic, + not following any commands. not opening eyes to deep sternal rub  Psychiatrical: unable to assess  SKIN: no rash, lesions or ulcers      Labs:                          14.5   14.45 )-----------( 206      ( 10 Feb 2020 17:46 )             45.5     02-10    141  |  104  |  24<H>  ----------------------------<  210<H>  3.6   |  23  |  0.80    Ca    9.0      10 Feb 2020 17:46    TPro  8.0  /  Alb  4.1  /  TBili  0.6  /  DBili  x   /  AST  31  /  ALT  50  /  AlkPhos  84  02-10    LIVER FUNCTIONS - ( 10 Feb 2020 17:46 )  Alb: 4.1 g/dL / Pro: 8.0 g/dL / ALK PHOS: 84 U/L / ALT: 50 U/L DA / AST: 31 U/L / GGT: x           PT/INR - ( 10 Feb 2020 17:46 )   PT: 11.7 sec;   INR: 1.05 ratio         PTT - ( 10 Feb 2020 17:46 )  PTT:28.2 sec      Active Medications  MEDICATIONS  (STANDING):  dextrose 5%. 1000 milliLiter(s) (50 mL/Hr) IV Continuous <Continuous>  dextrose 50% Injectable 12.5 Gram(s) IV Push once  dextrose 50% Injectable 25 Gram(s) IV Push once  dextrose 50% Injectable 25 Gram(s) IV Push once  heparin  Injectable 5000 Unit(s) SubCutaneous every 12 hours  insulin lispro (HumaLOG) corrective regimen sliding scale   SubCutaneous three times a day before meals  sodium chloride 0.9%. 1000 milliLiter(s) (50 mL/Hr) IV Continuous <Continuous>    MEDICATIONS  (PRN):  acetaminophen  Suppository .. 650 milliGRAM(s) Rectal every 6 hours PRN Temp greater or equal to 38C (100.4F), Mild Pain (1 - 3)  bisacodyl Suppository 10 milliGRAM(s) Rectal daily PRN Constipation  dextrose 40% Gel 15 Gram(s) Oral once PRN Blood Glucose LESS THAN 70 milliGRAM(s)/deciliter  glucagon  Injectable 1 milliGRAM(s) IntraMuscular once PRN Glucose LESS THAN 70 milligrams/deciliter  labetalol Injectable 10 milliGRAM(s) IV Push every 6 hours PRN SBP>190 or DBP>110

## 2020-02-10 NOTE — ED ADULT NURSE NOTE - OBJECTIVE STATEMENT
transfer from [place], As per EMS, "unwitnessed fall, last known normal yesterday at 1600. Found today on the floor in her home. Brought to [place], GCS was 8 then decreased to 4 in Ct Scan, left MCA noted upon imagining. Pt was intubated, given propafol 10 mg push, etomidate 20 mg, and a prop drip at 20 mcg/kg and transferred to [facility] ED". Pt physically responds to pain. Pt moves extremities spontaneously. Pt doesn't follow commands. Pt doesn't open eyes on command. PERRL. pt transferred from [place] and as per EMS, "pt was found on the floor in her home today. pt last known normal was 1600 yesterday (2/09/2020). pt was brought to [place] ED and was found to have a left MCA stroke. pt was intubated with 20mg etomidate, 10mg propofol IVP and a drip with rate of 20mcg/kg/min." upon arrival to [facility] ED, pt moves all extremities spontaneously and withdraws from painful stimuli. no trauma or wounds noted upon exam. pupils 3mm B/L and reactive.

## 2020-02-11 DIAGNOSIS — R09.89 OTHER SPECIFIED SYMPTOMS AND SIGNS INVOLVING THE CIRCULATORY AND RESPIRATORY SYSTEMS: ICD-10-CM

## 2020-02-11 DIAGNOSIS — I63.9 CEREBRAL INFARCTION, UNSPECIFIED: ICD-10-CM

## 2020-02-11 LAB
ANION GAP SERPL CALC-SCNC: 11 MMOL/L — SIGNIFICANT CHANGE UP (ref 5–17)
ANION GAP SERPL CALC-SCNC: 9 MMOL/L — SIGNIFICANT CHANGE UP (ref 5–17)
BLD GP AB SCN SERPL QL: NEGATIVE — SIGNIFICANT CHANGE UP
BUN SERPL-MCNC: 24 MG/DL — HIGH (ref 7–23)
BUN SERPL-MCNC: 29 MG/DL — HIGH (ref 7–23)
CALCIUM SERPL-MCNC: 8.3 MG/DL — LOW (ref 8.4–10.5)
CALCIUM SERPL-MCNC: 8.5 MG/DL — SIGNIFICANT CHANGE UP (ref 8.4–10.5)
CHLORIDE SERPL-SCNC: 106 MMOL/L — SIGNIFICANT CHANGE UP (ref 96–108)
CHLORIDE SERPL-SCNC: 108 MMOL/L — SIGNIFICANT CHANGE UP (ref 96–108)
CHOLEST SERPL-MCNC: 138 MG/DL — SIGNIFICANT CHANGE UP (ref 10–199)
CK SERPL-CCNC: 270 U/L — HIGH (ref 25–170)
CO2 SERPL-SCNC: 21 MMOL/L — LOW (ref 22–31)
CO2 SERPL-SCNC: 21 MMOL/L — LOW (ref 22–31)
CREAT SERPL-MCNC: 0.6 MG/DL — SIGNIFICANT CHANGE UP (ref 0.5–1.3)
CREAT SERPL-MCNC: 0.67 MG/DL — SIGNIFICANT CHANGE UP (ref 0.5–1.3)
GAS PNL BLDA: SIGNIFICANT CHANGE UP
GAS PNL BLDA: SIGNIFICANT CHANGE UP
GLUCOSE SERPL-MCNC: 189 MG/DL — HIGH (ref 70–99)
GLUCOSE SERPL-MCNC: 191 MG/DL — HIGH (ref 70–99)
HBA1C BLD-MCNC: 8.1 % — HIGH (ref 4–5.6)
HCT VFR BLD CALC: 36.2 % — SIGNIFICANT CHANGE UP (ref 34.5–45)
HDLC SERPL-MCNC: 27 MG/DL — LOW
HGB BLD-MCNC: 11.2 G/DL — LOW (ref 11.5–15.5)
LIPID PNL WITH DIRECT LDL SERPL: 70 MG/DL — SIGNIFICANT CHANGE UP
MAGNESIUM SERPL-MCNC: 1.9 MG/DL — SIGNIFICANT CHANGE UP (ref 1.6–2.6)
MAGNESIUM SERPL-MCNC: 2.5 MG/DL — SIGNIFICANT CHANGE UP (ref 1.6–2.6)
MCHC RBC-ENTMCNC: 27.6 PG — SIGNIFICANT CHANGE UP (ref 27–34)
MCHC RBC-ENTMCNC: 30.9 GM/DL — LOW (ref 32–36)
MCV RBC AUTO: 89.2 FL — SIGNIFICANT CHANGE UP (ref 80–100)
NRBC # BLD: 0 /100 WBCS — SIGNIFICANT CHANGE UP (ref 0–0)
PHOSPHATE SERPL-MCNC: 2.5 MG/DL — SIGNIFICANT CHANGE UP (ref 2.5–4.5)
PHOSPHATE SERPL-MCNC: 3 MG/DL — SIGNIFICANT CHANGE UP (ref 2.5–4.5)
PLATELET # BLD AUTO: 165 K/UL — SIGNIFICANT CHANGE UP (ref 150–400)
POTASSIUM SERPL-MCNC: 3.3 MMOL/L — LOW (ref 3.5–5.3)
POTASSIUM SERPL-MCNC: 4.2 MMOL/L — SIGNIFICANT CHANGE UP (ref 3.5–5.3)
POTASSIUM SERPL-SCNC: 3.3 MMOL/L — LOW (ref 3.5–5.3)
POTASSIUM SERPL-SCNC: 4.2 MMOL/L — SIGNIFICANT CHANGE UP (ref 3.5–5.3)
RBC # BLD: 4.06 M/UL — SIGNIFICANT CHANGE UP (ref 3.8–5.2)
RBC # FLD: 13.4 % — SIGNIFICANT CHANGE UP (ref 10.3–14.5)
RH IG SCN BLD-IMP: POSITIVE — SIGNIFICANT CHANGE UP
SODIUM SERPL-SCNC: 138 MMOL/L — SIGNIFICANT CHANGE UP (ref 135–145)
SODIUM SERPL-SCNC: 138 MMOL/L — SIGNIFICANT CHANGE UP (ref 135–145)
TOTAL CHOLESTEROL/HDL RATIO MEASUREMENT: 5 RATIO — SIGNIFICANT CHANGE UP (ref 3.3–7.1)
TRIGL SERPL-MCNC: 203 MG/DL — HIGH (ref 10–149)
WBC # BLD: 11.44 K/UL — HIGH (ref 3.8–10.5)
WBC # FLD AUTO: 11.44 K/UL — HIGH (ref 3.8–10.5)

## 2020-02-11 PROCEDURE — 71045 X-RAY EXAM CHEST 1 VIEW: CPT | Mod: 26,77

## 2020-02-11 PROCEDURE — 99291 CRITICAL CARE FIRST HOUR: CPT

## 2020-02-11 PROCEDURE — 70450 CT HEAD/BRAIN W/O DYE: CPT | Mod: 26

## 2020-02-11 PROCEDURE — 95720 EEG PHY/QHP EA INCR W/VEEG: CPT

## 2020-02-11 PROCEDURE — 71045 X-RAY EXAM CHEST 1 VIEW: CPT | Mod: 26

## 2020-02-11 PROCEDURE — 99292 CRITICAL CARE ADDL 30 MIN: CPT

## 2020-02-11 RX ORDER — VALSARTAN 80 MG/1
160 TABLET ORAL
Refills: 0 | Status: DISCONTINUED | OUTPATIENT
Start: 2020-02-11 | End: 2020-02-16

## 2020-02-11 RX ORDER — ACETAMINOPHEN 500 MG
650 TABLET ORAL EVERY 6 HOURS
Refills: 0 | Status: DISCONTINUED | OUTPATIENT
Start: 2020-02-11 | End: 2020-02-15

## 2020-02-11 RX ORDER — PANTOPRAZOLE SODIUM 20 MG/1
40 TABLET, DELAYED RELEASE ORAL DAILY
Refills: 0 | Status: DISCONTINUED | OUTPATIENT
Start: 2020-02-11 | End: 2020-02-16

## 2020-02-11 RX ORDER — ENOXAPARIN SODIUM 100 MG/ML
40 INJECTION SUBCUTANEOUS
Refills: 0 | Status: DISCONTINUED | OUTPATIENT
Start: 2020-02-11 | End: 2020-02-16

## 2020-02-11 RX ORDER — MAGNESIUM SULFATE 500 MG/ML
1 VIAL (ML) INJECTION ONCE
Refills: 0 | Status: COMPLETED | OUTPATIENT
Start: 2020-02-11 | End: 2020-02-11

## 2020-02-11 RX ORDER — INSULIN LISPRO 100/ML
VIAL (ML) SUBCUTANEOUS EVERY 6 HOURS
Refills: 0 | Status: DISCONTINUED | OUTPATIENT
Start: 2020-02-11 | End: 2020-02-14

## 2020-02-11 RX ORDER — FENTANYL CITRATE 50 UG/ML
50 INJECTION INTRAVENOUS ONCE
Refills: 0 | Status: DISCONTINUED | OUTPATIENT
Start: 2020-02-11 | End: 2020-02-11

## 2020-02-11 RX ORDER — METOPROLOL TARTRATE 50 MG
25 TABLET ORAL
Refills: 0 | Status: DISCONTINUED | OUTPATIENT
Start: 2020-02-11 | End: 2020-02-12

## 2020-02-11 RX ORDER — POTASSIUM CHLORIDE 20 MEQ
40 PACKET (EA) ORAL ONCE
Refills: 0 | Status: COMPLETED | OUTPATIENT
Start: 2020-02-11 | End: 2020-02-11

## 2020-02-11 RX ORDER — METOPROLOL TARTRATE 50 MG
25 TABLET ORAL
Refills: 0 | Status: DISCONTINUED | OUTPATIENT
Start: 2020-02-11 | End: 2020-02-11

## 2020-02-11 RX ORDER — ASPIRIN/CALCIUM CARB/MAGNESIUM 324 MG
300 TABLET ORAL DAILY
Refills: 0 | Status: DISCONTINUED | OUTPATIENT
Start: 2020-02-11 | End: 2020-02-13

## 2020-02-11 RX ORDER — POTASSIUM CHLORIDE 20 MEQ
10 PACKET (EA) ORAL
Refills: 0 | Status: DISCONTINUED | OUTPATIENT
Start: 2020-02-11 | End: 2020-02-11

## 2020-02-11 RX ORDER — MIDAZOLAM HYDROCHLORIDE 1 MG/ML
2 INJECTION, SOLUTION INTRAMUSCULAR; INTRAVENOUS ONCE
Refills: 0 | Status: DISCONTINUED | OUTPATIENT
Start: 2020-02-11 | End: 2020-02-11

## 2020-02-11 RX ORDER — PROPOFOL 10 MG/ML
30 INJECTION, EMULSION INTRAVENOUS
Qty: 500 | Refills: 0 | Status: DISCONTINUED | OUTPATIENT
Start: 2020-02-11 | End: 2020-02-11

## 2020-02-11 RX ORDER — SODIUM CHLORIDE 5 G/100ML
1000 INJECTION, SOLUTION INTRAVENOUS
Refills: 0 | Status: DISCONTINUED | OUTPATIENT
Start: 2020-02-11 | End: 2020-02-13

## 2020-02-11 RX ORDER — ENOXAPARIN SODIUM 100 MG/ML
40 INJECTION SUBCUTANEOUS
Refills: 0 | Status: DISCONTINUED | OUTPATIENT
Start: 2020-02-11 | End: 2020-02-11

## 2020-02-11 RX ORDER — SODIUM CHLORIDE 9 MG/ML
1000 INJECTION INTRAMUSCULAR; INTRAVENOUS; SUBCUTANEOUS
Refills: 0 | Status: DISCONTINUED | OUTPATIENT
Start: 2020-02-11 | End: 2020-02-11

## 2020-02-11 RX ORDER — ATORVASTATIN CALCIUM 80 MG/1
80 TABLET, FILM COATED ORAL AT BEDTIME
Refills: 0 | Status: DISCONTINUED | OUTPATIENT
Start: 2020-02-11 | End: 2020-02-16

## 2020-02-11 RX ADMIN — CHLORHEXIDINE GLUCONATE 15 MILLILITER(S): 213 SOLUTION TOPICAL at 05:31

## 2020-02-11 RX ADMIN — ATORVASTATIN CALCIUM 80 MILLIGRAM(S): 80 TABLET, FILM COATED ORAL at 23:01

## 2020-02-11 RX ADMIN — Medication 2: at 23:05

## 2020-02-11 RX ADMIN — CHLORHEXIDINE GLUCONATE 1 APPLICATION(S): 213 SOLUTION TOPICAL at 23:02

## 2020-02-11 RX ADMIN — ENOXAPARIN SODIUM 40 MILLIGRAM(S): 100 INJECTION SUBCUTANEOUS at 17:10

## 2020-02-11 RX ADMIN — SODIUM CHLORIDE 50 MILLILITER(S): 9 INJECTION INTRAMUSCULAR; INTRAVENOUS; SUBCUTANEOUS at 02:37

## 2020-02-11 RX ADMIN — Medication 650 MILLIGRAM(S): at 19:25

## 2020-02-11 RX ADMIN — PANTOPRAZOLE SODIUM 40 MILLIGRAM(S): 20 TABLET, DELAYED RELEASE ORAL at 12:24

## 2020-02-11 RX ADMIN — Medication 40 MILLIEQUIVALENT(S): at 05:31

## 2020-02-11 RX ADMIN — Medication 2: at 12:23

## 2020-02-11 RX ADMIN — Medication 100 GRAM(S): at 05:31

## 2020-02-11 RX ADMIN — Medication 300 MILLIGRAM(S): at 17:11

## 2020-02-11 RX ADMIN — DEXMEDETOMIDINE HYDROCHLORIDE IN 0.9% SODIUM CHLORIDE 3.54 MICROGRAM(S)/KG/HR: 4 INJECTION INTRAVENOUS at 00:50

## 2020-02-11 RX ADMIN — VALSARTAN 160 MILLIGRAM(S): 80 TABLET ORAL at 05:31

## 2020-02-11 RX ADMIN — PROPOFOL 12.85 MICROGRAM(S)/KG/MIN: 10 INJECTION, EMULSION INTRAVENOUS at 02:41

## 2020-02-11 RX ADMIN — DEXMEDETOMIDINE HYDROCHLORIDE IN 0.9% SODIUM CHLORIDE 3.54 MICROGRAM(S)/KG/HR: 4 INJECTION INTRAVENOUS at 02:41

## 2020-02-11 RX ADMIN — Medication 2: at 17:39

## 2020-02-11 RX ADMIN — CHLORHEXIDINE GLUCONATE 15 MILLILITER(S): 213 SOLUTION TOPICAL at 17:11

## 2020-02-11 RX ADMIN — CHLORHEXIDINE GLUCONATE 1 APPLICATION(S): 213 SOLUTION TOPICAL at 01:30

## 2020-02-11 RX ADMIN — MIDAZOLAM HYDROCHLORIDE 2 MILLIGRAM(S): 1 INJECTION, SOLUTION INTRAMUSCULAR; INTRAVENOUS at 00:56

## 2020-02-11 RX ADMIN — VALSARTAN 160 MILLIGRAM(S): 80 TABLET ORAL at 17:10

## 2020-02-11 RX ADMIN — Medication 25 MILLIGRAM(S): at 05:31

## 2020-02-11 RX ADMIN — Medication 2: at 06:11

## 2020-02-11 RX ADMIN — Medication 25 MILLIGRAM(S): at 17:10

## 2020-02-11 RX ADMIN — Medication 650 MILLIGRAM(S): at 18:42

## 2020-02-11 RX ADMIN — SODIUM CHLORIDE 50 MILLILITER(S): 5 INJECTION, SOLUTION INTRAVENOUS at 23:02

## 2020-02-11 NOTE — DIETITIAN INITIAL EVALUATION ADULT. - OTHER INFO
Limited history obtained as pt intubated and sedated at time of visit.     Per medical record pt is a 80yo female with PMH: HTN, T2DM, HLD, presented to [facility] for unresponsiveness, found with L MCA stroke. Transferred to [facility] for further care 2/11. Pt intubated and sedated at time of visit with family at bedside.     Per medical record pt is a 80yo female with PMH: HTN, T2DM, HLD, presented to [facility] for unresponsiveness, found with L MCA stroke. Transferred to [facility] for further care 2/11.    Pt's daughter reports pt had good appetite and PO intake PTA. States she cooked and consumed a wide variety of foods, with no dietary restrictions, and indulged in sweets occasionally at home. States she took vitamin D and calcium supplements PTA. Last BM unknown. Pt with medication (Repaglinide) controlled T2DM per H&P. HbA1c 8.1% (2/11) indicates fair blood glucose control.     Pt's daughter states pt's weight stable for years. Per HIE pt's weight in 11/2018 was 154.6 pounds. Pt's current charted dosing weight is 149 pounds. Pt intubated and sedated at time of visit with family at bedside.     Per medical record pt is a 80yo female with PMH: HTN, T2DM, HLD, presented to [facility] for unresponsiveness, found with L MCA stroke. Transferred to [facility] for further care 2/11.    Pt's daughter reports pt had good appetite and PO intake PTA. States she cooked and consumed a carbohydrate heavy diet, with a wide variety of foods, with no dietary restrictions, and indulged in sweets occasionally at home. States she took vitamin D and calcium supplements PTA. Last BM unknown. Pt with medication (Repaglinide) controlled T2DM per H&P. HbA1c 8.1% (2/11) indicates fair blood glucose control.     Pt's daughter states pt's weight stable for years. Per HIE pt's weight in 11/2018 was 154.6 pounds. Pt's current charted dosing weight is 149 pounds. Will continue to monitor.    Nutrition education deferred by family at this time.

## 2020-02-11 NOTE — PROGRESS NOTE ADULT - ASSESSMENT
ASSESSMENT/PLAN: Left MCA stroke    Stroke work-up/core measures  Secondary Stroke prevention: ASA and statin   EEG to r/o subclinical seizures if no structural change on CT  Delirium precautions  Dexmedetomidine and prn fentanyl for sedation  Vent support  VAP bundle  CXR  SBP goal 120-180mmHg  Fluids: IVF  F/u CPK as found down for unknown time  Diet: NPO  Place OGT  GI prophylaxis [] not indicated [x] PPI: vented [] other:  SCDs, lovenox ppx  GOC discussion with family ASSESSMENT/PLAN: Left MCA stroke    Stroke work-up/core measures  Secondary Stroke prevention: ASA and statin   EEG to r/o subclinical seizures if no structural change on CT  Delirium precautions  Dexmedetomidine and prn fentanyl for sedation  Vent support  VAP bundle  CXR, ABG  SBP goal 120-180mmHg  TTE ordered now  Fluids: IVF, check BMP now, goal Na 140-150  F/u CPK as found down for unknown time  Diet: NPO  Place OGT  GI prophylaxis [] not indicated [x] PPI: vented [] other:  SCDs, lovenox ppx  GOC discussion with family

## 2020-02-11 NOTE — EEG REPORT - NS EEG TEXT BOX
[facility]  Report of Prolonged Video EEG    [facility]: [address] , [address], [place] [address], Ph#: [phone]  [facility]: [id number] [place] [address], [place] [address], Ph#: [phone]  Office: [address] [place] [address], [facility] [address], [place] [address] Ph#: [phone]    Patient Name: STORM BROWNING  Age: 79y Gender:  Female   MRN #: [id number], Location:  [address]  Referring Physician: -  EEG #: [id number]    Study Date: 02 -11-20 02:24 to 08:00 Duration: 5hrs and 36min    Study Information:    EEG Recording Technique:  The patient underwent continuous Video-EEG monitoring, using Telemetry System hardware on the XLTek Digital System. EEG and video data were stored on a computer hard drive with important events saved in digital archive files. The material was reviewed by a physician (electroencephalographer / epileptologist) on a daily basis. Adrian and seizure detection algorithms were utilized and reviewed. An EEG Technician attended to the patient, and was available throughout daytime work hours.  The epilepsy center neurologist was available in person or on call 24-hours per day.    EEG Placement and Labeling of Electrodes:  The EEG was performed utilizing 20 channel referential EEG connections (coronal over temporal over parasagittal montage) using all standard 10-20 electrode placements with EKG, with additional electrodes placed in the inferior temporal region using the modified 10-10 montage electrode placements for elective admissions, or if deemed necessary. Recording was at a sampling rate of 256 samples per second per channel. Time synchronized digital video recording was done simultaneously with EEG recording. A low light infrared camera was used for low light recording.     History:  Medications: atorvastatin 80 milliGRAM(s) Oral at bedtime  chlorhexidine 0.12% Liquid 15 milliLiter(s) Oral Mucosa every 12 hours  chlorhexidine 4% Liquid 1 Application(s) Topical <User Schedule>  dexMEDEtomidine Infusion 0.2 MICROgram(s)/kG/Hr IV Continuous <Continuous>  insulin lispro (HumaLOG) corrective regimen sliding scale   SubCutaneous every 6 hours  metoprolol tartrate 25 milliGRAM(s) Oral two times a day  pantoprazole  Injectable 40 milliGRAM(s) IV Push daily  sodium chloride 0.9%. 1000 milliLiter(s) IV Continuous <Continuous>  valsartan 160 milliGRAM(s) Oral two times a day      Interpretation:  Daily EEG Visual Analysis  FINDINGS:  The background was continuous, spontaneously variable, and reactive. No posterior dominant rhythm seen.  There was more diffuse irregular theta/ polymorphic delta present.     Focal slowing: Continuous left hemispheric theta/ polymorphic delta present.     Sleep Background:  Normal stage II transients were not seen.    Interictal Epileptiform Abnormalities: Occasional left posterior quadrant ( max T7/P7) spike and wave discharges.     Ictal Abnormalities:  -No event captured.  -No electrographic seizures seen.     Activation Procedures:   Hyperventilation was not performed.    Photic stimulation was not performed.    Artifacts:  Intermittent myogenic artifacts were noted.    ECG:  The heart rate on single channel ECG was predominantly between 60-70 BPM.    _____________________________________________________________  EEG Classification / Summary:  Abnormal EEG in the sedated state due to:  1) Occasional left posterior quadrant ( max T7/P7) spike and wave discharges.   2) Continuous left hemispheric theta/ polymorphic delta present.   3) Moderate diffuse slowing.     Clinical Impression:  1) Potential epileptogenic focus in the left posterior quadrant region.  2) Structural abnormality in the left hemisphere.   3) Moderate diffuse or multifocal cerebral dysfunction.    No electrographic seizures seen.  ______________________________________________________________    Lonnie Ludwig DO  Epilepsy Fellow, [facility] [place]  Report of Prolonged Video EEG    [facility]: [address], [place] [address], Ph#: [phone]  [facility]: [id number] [place] [address], [place] [address], Ph#: [phone]  Office: [address] [place] [address], [facility] [address], [place] [address] Ph#: [phone]    Patient Name: STORM BROWNING  Age: 79y Gender:  Female   MRN #: [id number], Location:  [address]  Referring Physician: -  EEG #: XX    Study Date: 02 -11-20 02:24 to 08:00 Duration: 5hrs and 36min    Study Information:    EEG Recording Technique:  The patient underwent continuous Video-EEG monitoring, using Telemetry System hardware on the XLTek Digital System. EEG and video data were stored on a computer hard drive with important events saved in digital archive files. The material was reviewed by a physician (electroencephalographer / epileptologist) on a daily basis. Adrian and seizure detection algorithms were utilized and reviewed. An EEG Technician attended to the patient, and was available throughout daytime work hours.  The epilepsy center neurologist was available in person or on call 24-hours per day.    EEG Placement and Labeling of Electrodes:  The EEG was performed utilizing 20 channel referential EEG connections (coronal over temporal over parasagittal montage) using all standard 10-20 electrode placements with EKG, with additional electrodes placed in the inferior temporal region using the modified 10-10 montage electrode placements for elective admissions, or if deemed necessary. Recording was at a sampling rate of 256 samples per second per channel. Time synchronized digital video recording was done simultaneously with EEG recording. A low light infrared camera was used for low light recording.     History:  Medications: atorvastatin 80 milliGRAM(s) Oral at bedtime  chlorhexidine 0.12% Liquid 15 milliLiter(s) Oral Mucosa every 12 hours  chlorhexidine 4% Liquid 1 Application(s) Topical <User Schedule>  dexMEDEtomidine Infusion 0.2 MICROgram(s)/kG/Hr IV Continuous <Continuous>  insulin lispro (HumaLOG) corrective regimen sliding scale   SubCutaneous every 6 hours  metoprolol tartrate 25 milliGRAM(s) Oral two times a day  pantoprazole  Injectable 40 milliGRAM(s) IV Push daily  sodium chloride 0.9%. 1000 milliLiter(s) IV Continuous <Continuous>  valsartan 160 milliGRAM(s) Oral two times a day      Interpretation:  Daily EEG Visual Analysis  FINDINGS:  The background was continuous, spontaneously variable, and reactive. No posterior dominant rhythm seen.  There was more diffuse irregular theta/ polymorphic delta present.   Left sided attenuation of fast activity  Focal slowing: Continuous left hemispheric theta/ polymorphic delta present.     Sleep Background:  Normal stage II transients were not seen.    Interictal Epileptiform Abnormalities: Occasional left posterior quadrant ( max T7/P7) spike and wave discharges.     Ictal Abnormalities:  -No event captured.  -No electrographic seizures seen.     Activation Procedures:   Hyperventilation was not performed.    Photic stimulation was not performed.    Artifacts:  Intermittent myogenic artifacts were noted.    ECG:  The heart rate on single channel ECG was predominantly between 60-70 BPM.    _____________________________________________________________  EEG Classification / Summary:  Abnormal EEG in the sedated state due to:  1) Occasional left posterior quadrant ( max T7/P7) spike and wave discharges.   2) Continuous left hemispheric theta/ polymorphic delta present.  Left sided attenuation of fast activity  3) Moderate diffuse slowing.     Clinical Impression:  1) Potential epileptogenic focus in the left posterior quadrant region.  2) Structural abnormality in the left hemisphere involving grey and white matter.   3) Moderate diffuse or multifocal cerebral dysfunction.    No electrographic seizures seen.  ______________________________________________________________    Lonnie Ludwig DO  Epilepsy Fellow, [facility]    MD JACOBY Swanson  Director, Continuous EEG Monitoring Service, [facility]    and Epilepsy Fellowship , Department of Neurology  [facility]

## 2020-02-11 NOTE — DIETITIAN INITIAL EVALUATION ADULT. - REASON INDICATOR FOR ASSESSMENT
Pt seen for MICU Length Of Stay initial nutrition evaluation.  Information obtained from medical record. Pt seen for MICU Length Of Stay initial nutrition evaluation.  Information obtained from medical record and patient's family at bedside.

## 2020-02-11 NOTE — PHYSICAL THERAPY INITIAL EVALUATION ADULT - PERTINENT HX OF CURRENT PROBLEM, REHAB EVAL
79 y.o. F with hx of HTN, DM II and HLD initially presented to [facility] on 2/10/20 after being found unresponsive by family at 4 pm. Of note, she was last seen well at 9:30pm on 2/9/20. At [facility], she was found to have right arm plegia and was globally aphasic. CT head revealed dense left MCA sign and acute left MCA infarction. CTA revealed a left M1 occlusion. She reportedly presented as a GCS 8 but declined to GCS 4, and was intubated for airway protection. Continued below.

## 2020-02-11 NOTE — PROGRESS NOTE ADULT - ASSESSMENT
ASSESSMENT/PLAN: Left MCA stroke    NEURO:  Stroke work-up/core measures  STAT CT Head as we do not have a scan after change in mental status - r/o hemorrhage or expansion of stroke  Secondary Stroke prevention: ASA and statin if no hemorrhage on CT  EEG to r/o subclinical seizures if no structural change on CT  Delirium precautions  Dexmedetomidine and prn fentanyl for sedation  Activity: [] mobilize as tolerated [x] Bedrest [] PT [] OT [] PMNR    PULM:  Vent support  VAP bundle  CXR    CV:  SBP goal 120-180mmHg; reduce to 120-160mmHg of heme on CT    RENAL:  Fluids: IVF  F/u CPK as found down for unknown time    GI:  Diet: NPO  Place OGT  GI prophylaxis [] not indicated [x] PPI: vented [] other:  Bowel regimen [] colace [] senna [] other:    ENDO:   Goal euglycemia (-180)  RISS    HEME/ONC:  VTE prophylaxis: [s] SCDs [] chemoprophylaxis - start if CT without heme [] hold chemoprophylaxis due to: [] high risk of DVT/PE on admission due to:    ID:  Monitor for fever    MISC:  Right wrist - x-ray to r/o fracture    SOCIAL/FAMILY:  [] awaiting [x] updated at bedside [] family meeting    CODE STATUS:  [x] Full Code [] DNR [] DNI [] Palliative/Comfort Care    DISPOSITION:  [x] ICU [] Stroke Unit [] Floor [] EMU [] RCU [] PCU    [x] Patient is at high risk of neurologic deterioration/death due to: seizure, intracerebral hemorrhage     Time spent: 30 [x] critical care minutes    Contact: [phone]

## 2020-02-11 NOTE — PROGRESS NOTE ADULT - SUBJECTIVE AND OBJECTIVE BOX
SUMMARY:   79 year-old woman with history of HTN, DM II and HLD initially presented to [facility] on 2/10/20 after being found unresponsive by family at 4 pm. Of note, she was last seen well at 9:30pm on 2/9/20. At baseline, she is independent and lives alone. At [facility], she was found to have right arm plegia and was globally aphasic. CT head revealed dense left MCA sign and acute left MCA infarction. CTA revealed a left M1 occlusion. She reportedly presented as a GCS 8 but declined to GCS 4, and was intubated for airway protection.    24 HOUR EVENTS:  Transferred to [facility] ED. Then admitted to NSCU Service, but residing in the MICU.             ICU Vital Signs Last 24 Hrs  T(C): 37.8 (11 Feb 2020 04:00), Max: 37.8 (11 Feb 2020 04:00)  T(F): 100 (11 Feb 2020 04:00), Max: 100 (11 Feb 2020 04:00)  HR: 66 (11 Feb 2020 07:00) (66 - 120)  BP: 108/53 (11 Feb 2020 07:00) (108/53 - 256/119)  BP(mean): 77 (11 Feb 2020 07:00) (77 - 124)  ABP: --  ABP(mean): --  RR: 18 (11 Feb 2020 07:00) (14 - 30)  SpO2: 100% (11 Feb 2020 07:00) (94% - 100%)      02-10-20 @ 07:01  -  02-11-20 @ 07:00  --------------------------------------------------------  IN: 354.1 mL / OUT: 150 mL / NET: 204.1 mL      Mode: AC/ CMV (Assist Control/ Continuous Mandatory Ventilation), RR (machine): 18, TV (machine): 400, FiO2: 50, PEEP: 5, ITime: 1, MAP: 8, PIP: 15  atorvastatin 80 milliGRAM(s) Oral at bedtime  chlorhexidine 0.12% Liquid 15 milliLiter(s) Oral Mucosa every 12 hours  chlorhexidine 4% Liquid 1 Application(s) Topical <User Schedule>  dexMEDEtomidine Infusion 0.2 MICROgram(s)/kG/Hr (3.54 mL/Hr) IV Continuous <Continuous>  insulin lispro (HumaLOG) corrective regimen sliding scale   SubCutaneous every 6 hours  metoprolol tartrate 25 milliGRAM(s) Oral two times a day  pantoprazole  Injectable 40 milliGRAM(s) IV Push daily  sodium chloride 0.9%. 1000 milliLiter(s) (50 mL/Hr) IV Continuous <Continuous>  valsartan 160 milliGRAM(s) Oral two times a day                            14.5   14.45 )-----------( 206      ( 10 Feb 2020 17:46 )             45.5     02-11    138  |  106  |  24<H>  ----------------------------<  189<H>  3.3<L>   |  21<L>  |  0.60    Ca    8.5      11 Feb 2020 01:56  Phos  3.0     02-11  Mg     1.9     02-11    TPro  8.0  /  Alb  4.1  /  TBili  0.6  /  DBili  x   /  AST  31  /  ALT  50  /  AlkPhos  84  02-10    LIVER FUNCTIONS - ( 10 Feb 2020 17:46 )  Alb: 4.1 g/dL / Pro: 8.0 g/dL / ALK PHOS: 84 U/L / ALT: 50 U/L DA / AST: 31 U/L / GGT: x           ABG - ( 11 Feb 2020 01:56 )  pH, Arterial: 7.40  pH, Blood: x     /  pCO2: 38    /  pO2: 144   / HCO3: 24    / Base Excess: -.6   /  SaO2: 99                  EXAMINATION:    General: No acute distress  HEENT: Anicteric sclerae  Cardiac: [id number]  Lungs: Clear  Abdomen: Soft, non-tender, +BS  Extremities: No c/c/e  Skin/Incision Site: Clean, dry and intact  Neurologic: Eyes closed, no gaze deviation, no command following, PERRL, +cough/gag, overbreathes vent set rate, localizes left arm briskly, withdraws left leg briskly, plegic on the right  Right wrist with mild edema and small bruise

## 2020-02-11 NOTE — DIETITIAN INITIAL EVALUATION ADULT. - PERTINENT LABORATORY DATA
(2/11) Finger Sticks 169, HbA1c 8.1%, Potassium 3.3 <L>, BUN 24 <H>, Glucose 189 <H> (2/10) Finger Sticks 164-182 (2/11) Finger Sticks 169, HbA1c 8.1%, Potassium 3.3 <L> (supplemented), BUN 24 <H>, Glucose 189 <H> (2/10) Finger Sticks 164-182

## 2020-02-11 NOTE — DIETITIAN INITIAL EVALUATION ADULT. - PHYSICAL APPEARANCE
Nutrition focused physical exam deferred by family at this time./other (specify) Ht: 66inches Wt: 149 pounds BMI: 24.1kg/m2 IBW: 130 pounds (+/-10%) %IBW: 114%  Edema: 2+ right arm per flow sheets Skin: no pressure injuries per documentation

## 2020-02-11 NOTE — PROGRESS NOTE ADULT - SUBJECTIVE AND OBJECTIVE BOX
Evolving large L MCA infarct    VITALS/DATA/ORDERS: [x] Reviewed    EXAMINATION:  intubated, no EO to noxious, no gaze deviation, no command following, PERRL, +cough/gag, overbreathes vent set rate, localizes left arm briskly, withdraws left leg briskly, plegic on the right Evolving large L MCA infarct    VITALS/DATA/ORDERS: [x] Reviewed    EXAMINATION:  intubated, EO to noxious, no gaze deviation, no command following, PERRL, +cough/gag, overbreathes vent set rate, localizes left arm briskly, RUE plegic, BLE WD L>R

## 2020-02-11 NOTE — DIETITIAN INITIAL EVALUATION ADULT. - ADD RECOMMEND
1. Continue to monitor diet initiation, weight, labs, skin integrity, and educational needs. 2. Monitor and replete electrolytes as needed. 3. Follow up with nutrition education as appropriate. 4. Pt's family made aware RD remains available.

## 2020-02-11 NOTE — PROGRESS NOTE ADULT - ASSESSMENT
ASSESSMENT/PLAN: Left MCA stroke    NEURO:  Stroke work-up/core measures  STAT CT Head as we do not have a scan after change in mental status - r/o hemorrhage or expansion of stroke  Secondary Stroke prevention: ASA and statin if no hemorrhage on CT  EEG to r/o subclinical seizures if no structural change on CT  Delirium precautions  Dexmedetomidine and prn fentanyl for sedation  Activity: [] mobilize as tolerated [x] Bedrest [] PT [] OT [] PMNR    PULM:  Vent support  VAP bundle  CXR    CV:  SBP goal 120-180mmHg; reduce to 120-160mmHg of heme on CT    RENAL:  Fluids: IVF  F/u CPK as found down for unknown time    GI:  Diet: NPO  Place OGT  GI prophylaxis [] not indicated [x] PPI: vented [] other:  Bowel regimen [] colace [] senna [] other:    ENDO:   Goal euglycemia (-180)  RISS    HEME/ONC:  VTE prophylaxis: [s] SCDs [] chemoprophylaxis - start if CT without heme [] hold chemoprophylaxis due to: [] high risk of DVT/PE on admission due to:    ID:  Monitor for fever    MISC:  Right wrist - x-ray to r/o fracture    SOCIAL/FAMILY:  [] awaiting [x] updated at bedside [] family meeting    CODE STATUS:  [x] Full Code [] DNR [] DNI [] Palliative/Comfort Care    DISPOSITION:  [x] ICU [] Stroke Unit [] Floor [] EMU [] RCU [] PCU    [x] Patient is at high risk of neurologic deterioration/death due to: seizure, intracerebral hemorrhage     Time spent: 30 [x] critical care minutes    Contact: [phone] ASSESSMENT/PLAN: Left MCA stroke    NEURO:  Stroke work-up/core measures  Secondary Stroke prevention: ASA and statin if no hemorrhage on CT  EEG to r/o subclinical seizures if no structural change on CT  Delirium precautions  Dexmedetomidine and prn fentanyl for sedation  Activity: [] mobilize as tolerated [x] Bedrest [] PT [] OT [] PMNR    PULM:  Vent support  VAP bundle  CXR    CV:  SBP goal 120-180mmHg; reduce to 120-160mmHg of heme on CT    RENAL:  Fluids: IVF      GI:  Diet: start TF   GI prophylaxis [] not indicated [x] PPI: vented [] other:  Bowel regimen [] colace [] senna [] other:    ENDO:   Goal euglycemia (-180)  RISS    HEME/ONC:  VTE prophylaxis: [s] SCDs [] chemoprophylaxis - start if CT without heme [] hold chemoprophylaxis due to: [] high risk of DVT/PE on admission due to:    ID:  Monitor for fever    MISC:  Right wrist - x-ray to r/o fracture    SOCIAL/FAMILY:  [] awaiting [x] updated at bedside [] family meeting    CODE STATUS:  [x] Full Code [] DNR [] DNI [] Palliative/Comfort Care    DISPOSITION:  [x] ICU [] Stroke Unit [] Floor [] EMU [] RCU [] PCU    [x] Patient is at high risk of neurologic deterioration/death due to: seizure, intracerebral hemorrhage     Time spent: 30 [x] critical care minutes    Contact: [phone] ASSESSMENT/PLAN: Left MCA stroke    NEURO:  Stroke work-up/core measures  Secondary Stroke prevention: ASA and statin if no hemorrhage on CT  EEG to r/o subclinical seizures if no structural change on CT  Delirium precautions  Dexmedetomidine and prn fentanyl for sedation  Activity: [] mobilize as tolerated [x] Bedrest [] PT [] OT [] PMNR    PULM:  Vent support  VAP bundle  CXR    CV:  SBP goal 120-180mmHg; reduce to 120-160mmHg of heme on CT    RENAL:  Fluids: IVF      GI:  Diet: start TF   GI prophylaxis [] not indicated [x] PPI: vented [] other:  Bowel regimen [] colace [] senna [] other:    ENDO:   Goal euglycemia (-180)  RISS    HEME/ONC:  VTE prophylaxis: [s] SCDs [] chemoprophylaxis - start if CT without heme [] hold chemoprophylaxis due to: [] high risk of DVT/PE on admission due to:    ID:  Monitor for fever    MISC:  Right wrist - x-ray to r/o fracture    SOCIAL/FAMILY:  [] awaiting [x] updated at bedside [] family meeting    CODE STATUS:  [x] Full Code [] DNR [] DNI [] Palliative/Comfort Care    DISPOSITION:  [x] ICU [] Stroke Unit [] Floor [] EMU [] RCU [] PCU    [x] Patient is at high risk of neurologic deterioration/death due to: seizure, intracerebral hemorrhage     Time spent: 45 [x] critical care minutes    Contact: [phone]

## 2020-02-11 NOTE — DIETITIAN INITIAL EVALUATION ADULT. - DIET TYPE
NPO If pt extubated and Po diet initiated recommend Consistent Carbohydrate diet; Defer texture to medical team. Monitor and adjust as needed.

## 2020-02-11 NOTE — PROGRESS NOTE ADULT - SUBJECTIVE AND OBJECTIVE BOX
SUMMARY:   79 year-old woman with history of HTN, DM II and HLD initially presented to [facility] on 2/10/20 after being found unresponsive by family at 4 pm. Of note, she was last seen well at 9:30pm on 2/9/20. At baseline, she is independent and lives alone. At [facility], she was found to have right arm plegia and was globally aphasic. CT head revealed dense left MCA sign and acute left MCA infarction. CTA revealed a left M1 occlusion. She reportedly presented as a GCS 8 but declined to GCS 4, and and was intubated for airway protection. SUMMARY:   79 year-old woman with history of HTN, DM II and HLD initially presented to [facility] on 2/10/20 after being found unresponsive by family at 4 pm. Of note, she was last seen well at 9:30pm on 2/9/20. At baseline, she is independent and lives alone. At [facility], she was found to have right arm plegia and was globally aphasic. CT head revealed dense left MCA sign and acute left MCA infarction. CTA revealed a left M1 occlusion. She reportedly presented as a GCS 8 but declined to GCS 4, and was intubated for airway protection.    24 HOUR EVENTS:  Transferred to [facility] ED. Then admitted to NSCU Service, but residing in the MICU.     VITALS/DATA/ORDERS: [x] Reviewed    EXAMINATION:  Eyes closed, no gaze deviation, no command following, PERRL, +cough/gag, overbreathes vent set rate, localizes left arm briskly, withdraws left leg briskly, plegic on the right  Right wrist with mild edema and small bruise

## 2020-02-11 NOTE — DIETITIAN INITIAL EVALUATION ADULT. - ENTERAL
If pt remains intubated recommend Glucerna 1.2 initiated @ 30ml/hour increased 10ml q6 hours to goal rate of 80ml/hour x 18 hours to provide 1440ml, 1728kcal, 86.4g protein, 1159ml water. (Based on 67.6kg dosing weight provides 25.5kcal/kg, 1.27g/kg protein). Monitor and adjust as needed.

## 2020-02-11 NOTE — CHART NOTE - NSCHARTNOTEFT_GEN_A_CORE
CAPRINI SCORE [CLOT] Score on Admission for     AGE RELATED RISK FACTORS                                                       MOBILITY RELATED FACTORS  [ ] Age 41-60 years                                            (1 Point)                  [ ] Bed rest                                                        (1 Point)  [ ] Age: 61-74 years                                           (2 Points)                 [ ] Plaster cast                                                   (2 Points)  [x ] Age= 75 years                                              (3 Points)                 [ ] Bed bound for more than 72 hours                 (2 Points)    DISEASE RELATED RISK FACTORS                                               GENDER SPECIFIC FACTORS  [ ] Edema in the lower extremities                       (1 Point)                  [ ] Pregnancy                                                     (1 Point)  [ ] Varicose veins                                               (1 Point)                  [ ] Post-partum < 6 weeks                                   (1 Point)             [x] BMI > 25 Kg/m2                                            (1 Point)                  [ ] Hormonal therapy  or oral contraception          (1 Point)                 [ ] Sepsis (in the previous month)                        (1 Point)                  [ ] History of pregnancy complications                 (1 point)  [ ] Pneumonia or serious lung disease                                               [ ] Unexplained or recurrent                     (1 Point)           (in the previous month)                               (1 Point)  [ ] Abnormal pulmonary function test                     (1 Point)                 SURGERY RELATED RISK FACTORS (include planned surgeries)  [ ] Acute myocardial infarction                              (1 Point)                 [ ]  Section                                             (1 Point)  [ ] Congestive heart failure (in the previous month)  (1 Point)         [ ] Minor surgery                                                  (1 Point)   [ ] Inflammatory bowel disease                             (1 Point)                 [ ] Arthroscopic surgery                                        (2 Points)  [ ] Central venous access                                      (2 Points)                [ ] General surgery lasting more than 45 minutes   (2 Points)       [x] Stroke (in the previous month)                          (5 Points)               [ ] Elective arthroplasty                                         (5 Points)            [ ] current or past malignancy                              (2 Points)                                                                                                       HEMATOLOGY RELATED FACTORS                                                 TRAUMA RELATED RISK FACTORS  [ ] Prior episodes of VTE                                     (3 Points)                [ ] Fracture of the hip, pelvis, or leg                       (5 Points)  [ ] Positive family history for VTE                         (3 Points)                 [ ] Acute spinal cord injury (in the previous month)  (5 Points)  [ ] Prothrombin 37893 A                                     (3 Points)                 [ ] Paralysis  (less than 1 month)                             (5 Points)  [ ] Factor V Leiden                                             (3 Points)                  [ ] Multiple Trauma within 1 month                        (5 Points)  [ ] Lupus anticoagulants                                     (3 Points)                                                           [ ] Anticardiolipin antibodies                               (3 Points)                                                       [ ] High homocysteine in the blood                      (3 Points)                                             [ ] Other congenital or acquired thrombophilia      (3 Points)                                                [ ] Heparin induced thrombocytopenia                  (3 Points)                                          Total Score [     9     ]    Risk:  Very low 0   Low 1 to 2   Moderate 3 to 4   High =5       VTE Prophylasix Recommednations:  [x ] mechanical pneumatic compression devices                                      [ ] contraindicated: _____________________  [ ] chemo prophylasix                                                                                   [x ] contraindicated _____________________    **** HIGH LIKELIHOOD DVT PRESENT ON ADMISSION  [x] (please order LE dopplers within 24 hours of admission)

## 2020-02-11 NOTE — PHYSICAL THERAPY INITIAL EVALUATION ADULT - PRECAUTIONS/LIMITATIONS, REHAB EVAL
Head CT 2/11/2020: Interval demarcation of a large left MCA territory infarct. No CT evidence for renea hemorrhagic transformation. New mass effect upon the left lateral ventricle. New minimal rightward midline shift. No effacement of the basal cisterns. No hydrocephalus. (-) CXR 2/11/2020.

## 2020-02-12 LAB
ANION GAP SERPL CALC-SCNC: 10 MMOL/L — SIGNIFICANT CHANGE UP (ref 5–17)
ANION GAP SERPL CALC-SCNC: 12 MMOL/L — SIGNIFICANT CHANGE UP (ref 5–17)
ANION GAP SERPL CALC-SCNC: 12 MMOL/L — SIGNIFICANT CHANGE UP (ref 5–17)
BUN SERPL-MCNC: 22 MG/DL — SIGNIFICANT CHANGE UP (ref 7–23)
BUN SERPL-MCNC: 22 MG/DL — SIGNIFICANT CHANGE UP (ref 7–23)
BUN SERPL-MCNC: 25 MG/DL — HIGH (ref 7–23)
CALCIUM SERPL-MCNC: 8.3 MG/DL — LOW (ref 8.4–10.5)
CALCIUM SERPL-MCNC: 8.3 MG/DL — LOW (ref 8.4–10.5)
CALCIUM SERPL-MCNC: 8.4 MG/DL — SIGNIFICANT CHANGE UP (ref 8.4–10.5)
CHLORIDE SERPL-SCNC: 106 MMOL/L — SIGNIFICANT CHANGE UP (ref 96–108)
CHLORIDE SERPL-SCNC: 109 MMOL/L — HIGH (ref 96–108)
CHLORIDE SERPL-SCNC: 112 MMOL/L — HIGH (ref 96–108)
CK SERPL-CCNC: 202 U/L — HIGH (ref 25–170)
CK SERPL-CCNC: 237 U/L — HIGH (ref 25–170)
CO2 SERPL-SCNC: 21 MMOL/L — LOW (ref 22–31)
CO2 SERPL-SCNC: 21 MMOL/L — LOW (ref 22–31)
CO2 SERPL-SCNC: 22 MMOL/L — SIGNIFICANT CHANGE UP (ref 22–31)
CREAT SERPL-MCNC: 0.52 MG/DL — SIGNIFICANT CHANGE UP (ref 0.5–1.3)
CREAT SERPL-MCNC: 0.57 MG/DL — SIGNIFICANT CHANGE UP (ref 0.5–1.3)
CREAT SERPL-MCNC: 0.61 MG/DL — SIGNIFICANT CHANGE UP (ref 0.5–1.3)
GLUCOSE SERPL-MCNC: 218 MG/DL — HIGH (ref 70–99)
GLUCOSE SERPL-MCNC: 224 MG/DL — HIGH (ref 70–99)
GLUCOSE SERPL-MCNC: 250 MG/DL — HIGH (ref 70–99)
MAGNESIUM SERPL-MCNC: 2.3 MG/DL — SIGNIFICANT CHANGE UP (ref 1.6–2.6)
PHOSPHATE SERPL-MCNC: 1.8 MG/DL — LOW (ref 2.5–4.5)
POTASSIUM SERPL-MCNC: 3.5 MMOL/L — SIGNIFICANT CHANGE UP (ref 3.5–5.3)
POTASSIUM SERPL-MCNC: 3.6 MMOL/L — SIGNIFICANT CHANGE UP (ref 3.5–5.3)
POTASSIUM SERPL-MCNC: 4.2 MMOL/L — SIGNIFICANT CHANGE UP (ref 3.5–5.3)
POTASSIUM SERPL-SCNC: 3.5 MMOL/L — SIGNIFICANT CHANGE UP (ref 3.5–5.3)
POTASSIUM SERPL-SCNC: 3.6 MMOL/L — SIGNIFICANT CHANGE UP (ref 3.5–5.3)
POTASSIUM SERPL-SCNC: 4.2 MMOL/L — SIGNIFICANT CHANGE UP (ref 3.5–5.3)
SODIUM SERPL-SCNC: 139 MMOL/L — SIGNIFICANT CHANGE UP (ref 135–145)
SODIUM SERPL-SCNC: 142 MMOL/L — SIGNIFICANT CHANGE UP (ref 135–145)
SODIUM SERPL-SCNC: 144 MMOL/L — SIGNIFICANT CHANGE UP (ref 135–145)

## 2020-02-12 PROCEDURE — 95720 EEG PHY/QHP EA INCR W/VEEG: CPT

## 2020-02-12 PROCEDURE — 99292 CRITICAL CARE ADDL 30 MIN: CPT

## 2020-02-12 PROCEDURE — 93306 TTE W/DOPPLER COMPLETE: CPT | Mod: 26

## 2020-02-12 PROCEDURE — 99291 CRITICAL CARE FIRST HOUR: CPT

## 2020-02-12 RX ORDER — METOPROLOL TARTRATE 50 MG
50 TABLET ORAL
Refills: 0 | Status: DISCONTINUED | OUTPATIENT
Start: 2020-02-12 | End: 2020-02-12

## 2020-02-12 RX ORDER — METOPROLOL TARTRATE 50 MG
50 TABLET ORAL
Refills: 0 | Status: DISCONTINUED | OUTPATIENT
Start: 2020-02-12 | End: 2020-02-13

## 2020-02-12 RX ORDER — METOPROLOL TARTRATE 50 MG
25 TABLET ORAL ONCE
Refills: 0 | Status: COMPLETED | OUTPATIENT
Start: 2020-02-12 | End: 2020-02-12

## 2020-02-12 RX ORDER — HYDRALAZINE HCL 50 MG
10 TABLET ORAL EVERY 4 HOURS
Refills: 0 | Status: DISCONTINUED | OUTPATIENT
Start: 2020-02-12 | End: 2020-02-14

## 2020-02-12 RX ORDER — FENTANYL CITRATE 50 UG/ML
25 INJECTION INTRAVENOUS EVERY 4 HOURS
Refills: 0 | Status: DISCONTINUED | OUTPATIENT
Start: 2020-02-12 | End: 2020-02-14

## 2020-02-12 RX ADMIN — FENTANYL CITRATE 25 MICROGRAM(S): 50 INJECTION INTRAVENOUS at 14:35

## 2020-02-12 RX ADMIN — CHLORHEXIDINE GLUCONATE 15 MILLILITER(S): 213 SOLUTION TOPICAL at 17:19

## 2020-02-12 RX ADMIN — Medication 10 MILLIGRAM(S): at 22:42

## 2020-02-12 RX ADMIN — Medication 10 MILLIGRAM(S): at 09:33

## 2020-02-12 RX ADMIN — ENOXAPARIN SODIUM 40 MILLIGRAM(S): 100 INJECTION SUBCUTANEOUS at 17:17

## 2020-02-12 RX ADMIN — Medication 10 MILLIGRAM(S): at 18:15

## 2020-02-12 RX ADMIN — Medication 25 MILLIGRAM(S): at 04:58

## 2020-02-12 RX ADMIN — FENTANYL CITRATE 25 MICROGRAM(S): 50 INJECTION INTRAVENOUS at 09:45

## 2020-02-12 RX ADMIN — Medication 650 MILLIGRAM(S): at 14:50

## 2020-02-12 RX ADMIN — Medication 62.5 MILLIMOLE(S): at 06:43

## 2020-02-12 RX ADMIN — FENTANYL CITRATE 25 MICROGRAM(S): 50 INJECTION INTRAVENOUS at 14:50

## 2020-02-12 RX ADMIN — Medication 300 MILLIGRAM(S): at 11:49

## 2020-02-12 RX ADMIN — Medication 4: at 06:42

## 2020-02-12 RX ADMIN — Medication 650 MILLIGRAM(S): at 15:50

## 2020-02-12 RX ADMIN — CHLORHEXIDINE GLUCONATE 15 MILLILITER(S): 213 SOLUTION TOPICAL at 06:56

## 2020-02-12 RX ADMIN — Medication 50 MILLIGRAM(S): at 17:18

## 2020-02-12 RX ADMIN — ATORVASTATIN CALCIUM 80 MILLIGRAM(S): 80 TABLET, FILM COATED ORAL at 22:11

## 2020-02-12 RX ADMIN — PANTOPRAZOLE SODIUM 40 MILLIGRAM(S): 20 TABLET, DELAYED RELEASE ORAL at 11:49

## 2020-02-12 RX ADMIN — Medication 650 MILLIGRAM(S): at 22:11

## 2020-02-12 RX ADMIN — CHLORHEXIDINE GLUCONATE 1 APPLICATION(S): 213 SOLUTION TOPICAL at 22:11

## 2020-02-12 RX ADMIN — SODIUM CHLORIDE 75 MILLILITER(S): 5 INJECTION, SOLUTION INTRAVENOUS at 09:34

## 2020-02-12 RX ADMIN — VALSARTAN 160 MILLIGRAM(S): 80 TABLET ORAL at 04:58

## 2020-02-12 RX ADMIN — VALSARTAN 160 MILLIGRAM(S): 80 TABLET ORAL at 17:17

## 2020-02-12 RX ADMIN — FENTANYL CITRATE 25 MICROGRAM(S): 50 INJECTION INTRAVENOUS at 10:00

## 2020-02-12 RX ADMIN — Medication 2: at 17:17

## 2020-02-12 RX ADMIN — Medication 4: at 11:51

## 2020-02-12 RX ADMIN — Medication 650 MILLIGRAM(S): at 23:00

## 2020-02-12 NOTE — PROGRESS NOTE ADULT - SUBJECTIVE AND OBJECTIVE BOX
Evolving large L MCA infarct  hyperglycemia    VITALS/DATA/ORDERS: [x] Reviewed    EXAMINATION:  intubated, EO to noxious, no gaze deviation, no command following, PERRL, +cough/gag, overbreathes vent set rate, localizes left arm briskly, RUE plegic, BLE WD L>R

## 2020-02-12 NOTE — PROGRESS NOTE ADULT - SUBJECTIVE AND OBJECTIVE BOX
Neurology Follow up note    Name  STORM BROWNING    HPI:  79 year old female with PMH of HTN, DM, HLD presents to the [facility] ED as a stroke transfer. She initially presented to [facility]. Her daughter found her unresponsive at 4pm with Right sided weakness. LKN was 9:30 pm on 2/9. At baseline, she is independent and lives alone. At [facility], she was found to have right arm plegia and was globally aphasic. CT head revealed dense L MCA sign and acute L MCA infarction. CTA h/n revealed a L M1 occlusion. At [facility], her GCS fell from 8 to 4 and patient was intubated. No TPA given and no thrombectomy offered due to outside window.  NIHSS 19. MRS 0. (10 Feb 2020 23:55)      Interval History -        Subjective:    Review of Systems:  As above    MEDICATIONS  (STANDING):  aspirin Suppository 300 milliGRAM(s) Rectal daily  atorvastatin 80 milliGRAM(s) Oral at bedtime  chlorhexidine 0.12% Liquid 15 milliLiter(s) Oral Mucosa every 12 hours  chlorhexidine 4% Liquid 1 Application(s) Topical <User Schedule>  enoxaparin Injectable 40 milliGRAM(s) SubCutaneous <User Schedule>  insulin lispro (HumaLOG) corrective regimen sliding scale   SubCutaneous every 6 hours  metoprolol tartrate 50 milliGRAM(s) Oral two times a day  pantoprazole  Injectable 40 milliGRAM(s) IV Push daily  sodium chloride 2% . 1000 milliLiter(s) (75 mL/Hr) IV Continuous <Continuous>  valsartan 160 milliGRAM(s) Oral two times a day    MEDICATIONS  (PRN):  acetaminophen   Tablet .. 650 milliGRAM(s) Oral every 6 hours PRN Temp greater or equal to 38C (100.4F)  fentaNYL    Injectable 25 MICROGram(s) IV Push every 4 hours PRN restlessness  hydrALAZINE Injectable 10 milliGRAM(s) IV Push every 4 hours PRN SBP > 180      Allergies    No Known Allergies    Intolerances        Objective:   Vital Signs Last 24 Hrs  T(C): 37.9 (12 Feb 2020 08:00), Max: 38.2 (11 Feb 2020 18:00)  T(F): 100.2 (12 Feb 2020 08:00), Max: 100.8 (11 Feb 2020 18:00)  HR: 67 (12 Feb 2020 10:00) (51 - 84)  BP: 170/72 (12 Feb 2020 10:00) (110/53 - 217/107)  BP(mean): 104 (12 Feb 2020 10:00) (77 - 150)  RR: 17 (12 Feb 2020 10:00) (15 - 28)  SpO2: 99% (12 Feb 2020 10:00) (99% - 100%)    General Exam:   General appearance: No acute distress                 Cardiovascular: Pedal dorsalis pulses intact bilaterally    Neurological Exam:  Mental Status: Orientated to self, date and place.  Attention intact.  No dysarthria, aphasia or neglect.  Knowledge intact.  Registration intact.  Short and long term memory grossly intact.      Cranial Nerves: CN I - not tested.  PERRL, EOMI, VF intact, no nystagmus or diplopia. Fundi not visualized bilaterally.  CN V1-3 intact to light touch.  No facial asymmetry. Tongue, uvula and palate midline.  Sternocleidomastoid and Trapezius intact bilaterally.    Motor:   Tone: normal.                  Strength: intact throughout  Pronator drift: none                 Dysmetria: None to finger-nose-finger or heel-shin-heel  No truncal ataxia.    Tremor: No resting, postural or action tremor.  No myoclonus.    Sensation: intact to light touch    Deep Tendon Reflexes: 1+ bilateral biceps, triceps, brachioradialis, knee and ankle  Babinski equivocal    Gait: normal and stable.      Other:    02-12    139  |  106  |  25<H>  ----------------------------<  218<H>  4.2   |  21<L>  |  0.57    Ca    8.4      12 Feb 2020 04:37  Phos  1.8     02-12  Mg     2.3     02-12    TPro  8.0  /  Alb  4.1  /  TBili  0.6  /  DBili  x   /  AST  31  /  ALT  50  /  AlkPhos  84  02-10    02-12    139  |  106  |  25<H>  ----------------------------<  218<H>  4.2   |  21<L>  |  0.57    Ca    8.4      12 Feb 2020 04:37  Phos  1.8     02-12  Mg     2.3     02-12    TPro  8.0  /  Alb  4.1  /  TBili  0.6  /  DBili  x   /  AST  31  /  ALT  50  /  AlkPhos  84  02-10    LIVER FUNCTIONS - ( 10 Feb 2020 17:46 )  Alb: 4.1 g/dL / Pro: 8.0 g/dL / ALK PHOS: 84 U/L / ALT: 50 U/L DA / AST: 31 U/L / GGT: x             Radiology    EKG:  tele:  TTE:  EEG: Neurology Follow up note    Name  STORM BROWNING    Interval History - Patient intubated     Review of Systems:  As above    MEDICATIONS  (STANDING):  aspirin Suppository 300 milliGRAM(s) Rectal daily  atorvastatin 80 milliGRAM(s) Oral at bedtime  chlorhexidine 0.12% Liquid 15 milliLiter(s) Oral Mucosa every 12 hours  chlorhexidine 4% Liquid 1 Application(s) Topical <User Schedule>  enoxaparin Injectable 40 milliGRAM(s) SubCutaneous <User Schedule>  insulin lispro (HumaLOG) corrective regimen sliding scale   SubCutaneous every 6 hours  metoprolol tartrate 50 milliGRAM(s) Oral two times a day  pantoprazole  Injectable 40 milliGRAM(s) IV Push daily  sodium chloride 2% . 1000 milliLiter(s) (75 mL/Hr) IV Continuous <Continuous>  valsartan 160 milliGRAM(s) Oral two times a day    MEDICATIONS  (PRN):  acetaminophen   Tablet .. 650 milliGRAM(s) Oral every 6 hours PRN Temp greater or equal to 38C (100.4F)  fentaNYL    Injectable 25 MICROGram(s) IV Push every 4 hours PRN restlessness  hydrALAZINE Injectable 10 milliGRAM(s) IV Push every 4 hours PRN SBP > 180    Allergies    No Known Allergies    Intolerances    EE) Potential epileptogenic focus in the left posterior quadrant region.  2) Structural abnormality in the left hemisphere involving grey and white matter.   3) Moderate diffuse or multifocal cerebral dysfunction.    Objective:   Vital Signs Last 24 Hrs  T(C): 37.9 (2020 08:00), Max: 38.2 (2020 18:00)  T(F): 100.2 (2020 08:00), Max: 100.8 (2020 18:00)  HR: 67 (2020 10:00) (51 - 84)  BP: 170/72 (2020 10:00) (110/53 - 217/107)  BP(mean): 104 (2020 10:00) (77 - 150)  RR: 17 (2020 10:00) (15 - 28)  SpO2: 99% (2020 10:00) (99% - 100%)    General Exam:   Mental Status: Patient intubated, obtunded    Cranial Nerves: gaze orthophoric, -BTT, no gross facial asymmetry    Motor Exam:  No spontaneous movement of x4 limbs    Other:        139  |  106  |  25<H>  ----------------------------<  218<H>  4.2   |  21<L>  |  0.57    Ca    8.4      2020 04:37  Phos  1.8     02-12  Mg     2.3         TPro  8.0  /  Alb  4.1  /  TBili  0.6  /  DBili  x   /  AST  31  /  ALT  50  /  AlkPhos  84  10        139  |  106  |  25<H>  ----------------------------<  218<H>  4.2   |  21<L>  |  0.57    Ca    8.4      2020 04:37  Phos  1.8     02-12  Mg     2.3         TPro  8.0  /  Alb  4.1  /  TBili  0.6  /  DBili  x   /  AST  31  /  ALT  50  /  AlkPhos  84  10    LIVER FUNCTIONS - ( 10 Feb 2020 17:46 )  Alb: 4.1 g/dL / Pro: 8.0 g/dL / ALK PHOS: 84 U/L / ALT: 50 U/L DA / AST: 31 U/L / GGT: x             Radiology    EKG:  tele:  TTE:  EEG:

## 2020-02-12 NOTE — EEG REPORT - NS EEG TEXT BOX
[facility], [place]  Comprehensive Epilepsy Center  Report of Continuous Video EEG    [facility]: [address] [facility] , [address], [place] [address], Ph#: [phone]  LI: [id number] [place] [address], Ph#: [phone]  Office: [address] [place] [address] [address], [place] [address] Ph#: [phone]    Patient Name: STORM BROWNING  Age: 79y Gender:  Female   MRN #: [id number], Location:  [address]  Referring Physician: -  EEG #: [id number]    Study Date: 02 -11-20 08:00 to 02-12-20 08:00 Duration: 24hrs    Study Information:    EEG Recording Technique:  The patient underwent continuous Video-EEG monitoring, using Telemetry System hardware on the XLTek Digital System. EEG and video data were stored on a computer hard drive with important events saved in digital archive files. The material was reviewed by a physician (electroencephalographer / epileptologist) on a daily basis. Adrian and seizure detection algorithms were utilized and reviewed. An EEG Technician attended to the patient, and was available throughout daytime work hours.  The epilepsy center neurologist was available in person or on call 24-hours per day.    EEG Placement and Labeling of Electrodes:  The EEG was performed utilizing 20 channel referential EEG connections (coronal over temporal over parasagittal montage) using all standard 10-20 electrode placements with EKG, with additional electrodes placed in the inferior temporal region using the modified 10-10 montage electrode placements for elective admissions, or if deemed necessary. Recording was at a sampling rate of 256 samples per second per channel. Time synchronized digital video recording was done simultaneously with EEG recording. A low light infrared camera was used for low light recording.     History:  Medications: atorvastatin 80 milliGRAM(s) Oral at bedtime  chlorhexidine 0.12% Liquid 15 milliLiter(s) Oral Mucosa every 12 hours  chlorhexidine 4% Liquid 1 Application(s) Topical <User Schedule>  dexMEDEtomidine Infusion 0.2 MICROgram(s)/kG/Hr IV Continuous <Continuous>  insulin lispro (HumaLOG) corrective regimen sliding scale   SubCutaneous every 6 hours  metoprolol tartrate 25 milliGRAM(s) Oral two times a day  pantoprazole  Injectable 40 milliGRAM(s) IV Push daily  sodium chloride 0.9%. 1000 milliLiter(s) IV Continuous <Continuous>  valsartan 160 milliGRAM(s) Oral two times a day      Interpretation:  Daily EEG Visual Analysis  FINDINGS:  The background was continuous, spontaneously variable, and reactive. No posterior dominant rhythm seen.  There was more diffuse irregular theta/ polymorphic delta present.   Left sided attenuation of fast activity  Focal slowing: Continuous left hemispheric theta/ polymorphic delta present.     Sleep Background:  Normal stage II transients were not seen.    Interictal Epileptiform Abnormalities: Occasional left posterior quadrant ( max T7/P7) spike and wave discharges.     Ictal Abnormalities:  -No event captured.  -No electrographic seizures seen.     Activation Procedures:   Hyperventilation was not performed.    Photic stimulation was not performed.    Artifacts:  Intermittent myogenic artifacts were noted.    ECG:  The heart rate on single channel ECG was predominantly between 60-70 BPM.    _____________________________________________________________  EEG Classification / Summary:  Abnormal EEG in the sedated state due to:  1) Occasional left posterior quadrant ( max T7/P7) spike and wave discharges.   2) Continuous left hemispheric theta/ polymorphic delta present.  Left sided attenuation of fast activity  3) Moderate diffuse slowing.     Clinical Impression:  1) Potential epileptogenic focus in the left posterior quadrant region.  2) Structural abnormality in the left hemisphere involving grey and white matter.   3) Moderate diffuse or multifocal cerebral dysfunction.    No electrographic seizures seen.  ______________________________________________________________    Lonnie Ludwig DO  Epilepsy Fellow, [facility]  Comprehensive Epilepsy Center  Report of Continuous Video EEG    [facility]: [address] [facility] , [address], [place] [address], Ph#: [phone]  LI: [phone] [place] [address], Ph#: [phone]  Office: [address] [place] [address] [address], [place] [address] Ph#: [phone]    Patient Name: STORM BROWNING  Age: 79y Gender:  Female   MRN #: [id number], Location:  [address]    Study Date: 02 -11-20 08:00 to 02-12-20 08:00   Duration: 24hrs    Study Information:    EEG Recording Technique:  The patient underwent continuous Video-EEG monitoring, using Telemetry System hardware on the XLTek Digital System. EEG and video data were stored on a computer hard drive with important events saved in digital archive files. The material was reviewed by a physician (electroencephalographer / epileptologist) on a daily basis. Adrian and seizure detection algorithms were utilized and reviewed. An EEG Technician attended to the patient, and was available throughout daytime work hours.  The epilepsy center neurologist was available in person or on call 24-hours per day.    EEG Placement and Labeling of Electrodes:  The EEG was performed utilizing 20 channel referential EEG connections (coronal over temporal over parasagittal montage) using all standard 10-20 electrode placements with EKG, with additional electrodes placed in the inferior temporal region using the modified 10-10 montage electrode placements for elective admissions, or if deemed necessary. Recording was at a sampling rate of 256 samples per second per channel. Time synchronized digital video recording was done simultaneously with EEG recording. A low light infrared camera was used for low light recording.     History:  Medications: atorvastatin 80 milliGRAM(s) Oral at bedtime  chlorhexidine 0.12% Liquid 15 milliLiter(s) Oral Mucosa every 12 hours  chlorhexidine 4% Liquid 1 Application(s) Topical <User Schedule>  dexMEDEtomidine Infusion 0.2 MICROgram(s)/kG/Hr IV Continuous <Continuous>  insulin lispro (HumaLOG) corrective regimen sliding scale   SubCutaneous every 6 hours  metoprolol tartrate 25 milliGRAM(s) Oral two times a day  pantoprazole  Injectable 40 milliGRAM(s) IV Push daily  sodium chloride 0.9%. 1000 milliLiter(s) IV Continuous <Continuous>  valsartan 160 milliGRAM(s) Oral two times a day    Interpretation:  Daily EEG Visual Analysis  FINDINGS:  The background was continuous, spontaneously variable, and reactive. No posterior dominant rhythm seen.  There was more diffuse irregular theta/ polymorphic delta present.   Left sided attenuation of fast activity  Focal slowing: Continuous left hemispheric theta/ polymorphic delta present.     Sleep Background:  Normal stage II transients were not seen.    Interictal Epileptiform Abnormalities: rare left posterior quadrant ( max T7/P7) spike and wave discharges.     Ictal Abnormalities:  -No event captured.  -No electrographic seizures seen.     Activation Procedures:   Hyperventilation was not performed.    Photic stimulation was not performed.    Artifacts:  Intermittent myogenic artifacts were noted.    ECG:  The heart rate on single channel ECG was predominantly between 60-70 BPM.    _____________________________________________________________  EEG Classification / Summary:  Abnormal EEG in the sedated state due to:  1) Rare left posterior quadrant ( max T7/P7) spike and wave discharges.   2) Continuous left hemispheric theta/ polymorphic delta present.  Left sided attenuation of fast activity  3) Moderate diffuse slowing.     Clinical Impression:  1) Potential epileptogenic focus in the left posterior quadrant region.  2) Structural abnormality in the left hemisphere involving grey and white matter.   3) Moderate diffuse or multifocal cerebral dysfunction.    No electrographic seizures seen.  ______________________________________________________________    Lonnie Ludwig DO  Epilepsy Fellow, [facility] Comprehensive Epilepsy Center    Kirit Richter MD FAES  Director, Continuous EEG Monitoring Service, [facility]    and Epilepsy Fellowship , Department of Neurology  [facility]  Comprehensive Epilepsy Center  Report of Continuous Video EEG    [facility]: [address] [facility] , [address], [place] [address], Ph#: [phone]  LI: [phone] [place] [address], Ph#: [phone]  Office: [address] [place] [address], [facility] [address], [place] [address] Ph#: [phone]    Patient Name: STORM BROWNING  Age: 79y Gender:  Female   MRN #: [id number], Location:  [address]    Study Date: 02 -11-20 08:00 to 02-12-20 08:00   off EEG from 10:15 -13:50  Duration: 21hrs     Study Information:    EEG Recording Technique:  The patient underwent continuous Video-EEG monitoring, using Telemetry System hardware on the XLTek Digital System. EEG and video data were stored on a computer hard drive with important events saved in digital archive files. The material was reviewed by a physician (electroencephalographer / epileptologist) on a daily basis. Adrian and seizure detection algorithms were utilized and reviewed. An EEG Technician attended to the patient, and was available throughout daytime work hours.  The epilepsy center neurologist was available in person or on call 24-hours per day.    EEG Placement and Labeling of Electrodes:  The EEG was performed utilizing 20 channel referential EEG connections (coronal over temporal over parasagittal montage) using all standard 10-20 electrode placements with EKG, with additional electrodes placed in the inferior temporal region using the modified 10-10 montage electrode placements for elective admissions, or if deemed necessary. Recording was at a sampling rate of 256 samples per second per channel. Time synchronized digital video recording was done simultaneously with EEG recording. A low light infrared camera was used for low light recording.     History:  Medications: atorvastatin 80 milliGRAM(s) Oral at bedtime  chlorhexidine 0.12% Liquid 15 milliLiter(s) Oral Mucosa every 12 hours  chlorhexidine 4% Liquid 1 Application(s) Topical <User Schedule>  dexMEDEtomidine Infusion 0.2 MICROgram(s)/kG/Hr IV Continuous <Continuous>  insulin lispro (HumaLOG) corrective regimen sliding scale   SubCutaneous every 6 hours  metoprolol tartrate 25 milliGRAM(s) Oral two times a day  pantoprazole  Injectable 40 milliGRAM(s) IV Push daily  sodium chloride 0.9%. 1000 milliLiter(s) IV Continuous <Continuous>  valsartan 160 milliGRAM(s) Oral two times a day    Interpretation:  Daily EEG Visual Analysis    FINDINGS:  The background was continuous, spontaneously variable, and reactive. No posterior dominant rhythm seen.  There was more diffuse irregular theta/ polymorphic delta present.   Left sided attenuation of fast activity  Focal slowing: Continuous left hemispheric theta/ polymorphic delta present.     Sleep Background:  Normal stage II transients were not seen.    Interictal Epileptiform Abnormalities: rare left posterior quadrant ( max T7/P7) spike and wave discharges.     Ictal Abnormalities:  -No event captured.  -No electrographic seizures seen.     Activation Procedures:   Hyperventilation was not performed.    Photic stimulation was not performed.    Artifacts:  Intermittent myogenic artifacts were noted.    ECG:  The heart rate on single channel ECG was predominantly between 60-70 BPM.    _____________________________________________________________  EEG Classification / Summary:  Abnormal EEG in the sedated state due to:  1) Rare left posterior quadrant ( max T7/P7) spike and wave discharges.   2) Continuous left hemispheric theta/ polymorphic delta present.  Left sided attenuation of fast activity  3) Moderate diffuse slowing.     Clinical Impression:  1) Potential epileptogenic focus in the left posterior quadrant region.  2) Structural abnormality in the left hemisphere involving grey and white matter.   3) Moderate diffuse or multifocal cerebral dysfunction.    No electrographic seizures seen.  ______________________________________________________________    Lonnie Ludwig DO  Epilepsy Fellow, [facility]    MD JACOBY Swanson  Director, Continuous EEG Monitoring Service, [facility]    and Epilepsy Fellowship , Department of Neurology  [facility]

## 2020-02-12 NOTE — PROGRESS NOTE ADULT - ASSESSMENT
ASSESSMENT/PLAN: Left MCA stroke    Stroke work-up/core measures  Secondary Stroke prevention: ASA and statin   Delirium precautions  minimize sedation  Vent support, pressure support as tolerated  VAP bundle  SBP goal 120-180mmHg  TTE ordered now  hypertonics for Na goal 140-150  SCDs, lovenox ppx  euglycemia--MARIMAR  GOC discussion with family ASSESSMENT/PLAN: Left MCA stroke    Stroke work-up/core measures  Secondary Stroke prevention: ASA and statin   Delirium precautions  minimize sedation  Vent support, pressure support as tolerated  VAP bundle  SBP goal 120-180mmHg  TTE ordered now  hypertonics for Na goal 140-150  SCDs, lovenox ppx  euglycemia--MARIMAR  daughter updated on the phone at 1:18am on 2/13

## 2020-02-12 NOTE — PROGRESS NOTE ADULT - SUBJECTIVE AND OBJECTIVE BOX
SUMMARY:   79 year-old woman with history of HTN, DM II and HLD initially presented to [facility] on 2/10/20 after being found unresponsive by family at 4 pm. Of note, she was last seen well at 9:30pm on 2/9/20. At baseline, she is independent and lives alone. At [facility], she was found to have right arm plegia and was globally aphasic. CT head revealed dense left MCA sign and acute left MCA infarction. CTA revealed a left M1 occlusion. She reportedly presented as a GCS 8 but declined to GCS 4, and was intubated for airway protection.    Transferred to [facility] ED. Then admitted to NSCU Service, but residing in the MICU.     2/11: was started on TF, ASA and DVT PPX    no acute overnight events     ICU Vital Signs Last 24 Hrs  T(C): 37.7 (12 Feb 2020 04:00), Max: 38.2 (11 Feb 2020 18:00)  T(F): 99.9 (12 Feb 2020 04:00), Max: 100.8 (11 Feb 2020 18:00)  HR: 69 (12 Feb 2020 07:00) (51 - 84)  BP: 180/74 (12 Feb 2020 07:00) (110/53 - 217/107)  BP(mean): 107 (12 Feb 2020 07:00) (77 - 150)  RR: 18 (12 Feb 2020 07:00) (15 - 28)  SpO2: 99% (12 Feb 2020 07:00) (99% - 100%)      02-11-20 @ 07:01  -  02-12-20 @ 07:00  --------------------------------------------------------  IN: 1744.8 mL / OUT: 800 mL / NET: 944.8 mL      Mode: AC/ CMV (Assist Control/ Continuous Mandatory Ventilation), RR (machine): 12, TV (machine): 500, FiO2: 40, PEEP: 5, ITime: 1, MAP: 8, PIP: 16  acetaminophen   Tablet .. 650 milliGRAM(s) Oral every 6 hours PRN  aspirin Suppository 300 milliGRAM(s) Rectal daily  atorvastatin 80 milliGRAM(s) Oral at bedtime  chlorhexidine 0.12% Liquid 15 milliLiter(s) Oral Mucosa every 12 hours  chlorhexidine 4% Liquid 1 Application(s) Topical <User Schedule>  dexMEDEtomidine Infusion 0.2 MICROgram(s)/kG/Hr (3.54 mL/Hr) IV Continuous <Continuous>  enoxaparin Injectable 40 milliGRAM(s) SubCutaneous <User Schedule>  insulin lispro (HumaLOG) corrective regimen sliding scale   SubCutaneous every 6 hours  metoprolol tartrate 50 milliGRAM(s) Oral two times a day  pantoprazole  Injectable 40 milliGRAM(s) IV Push daily  sodium chloride 2% . 1000 milliLiter(s) (75 mL/Hr) IV Continuous <Continuous>  valsartan 160 milliGRAM(s) Oral two times a day                            11.2   11.44 )-----------( 165      ( 11 Feb 2020 20:03 )             36.2     02-12    139  |  106  |  25<H>  ----------------------------<  218<H>  4.2   |  21<L>  |  0.57    Ca    8.4      12 Feb 2020 04:37  Phos  1.8     02-12  Mg     2.3     02-12    TPro  8.0  /  Alb  4.1  /  TBili  0.6  /  DBili  x   /  AST  31  /  ALT  50  /  AlkPhos  84  02-10    LIVER FUNCTIONS - ( 10 Feb 2020 17:46 )  Alb: 4.1 g/dL / Pro: 8.0 g/dL / ALK PHOS: 84 U/L / ALT: 50 U/L DA / AST: 31 U/L / GGT: x           ABG - ( 11 Feb 2020 20:04 )  pH, Arterial: 7.45  pH, Blood: x     /  pCO2: 33    /  pO2: 185   / HCO3: 23    / Base Excess: -.2   /  SaO2: 100                       EXAMINATION:    General: No acute distress  HEENT: Anicteric sclerae  Cardiac: [id number]  Lungs: Clear  Abdomen: Soft, non-tender, +BS  Extremities: No c/c/e  Skin/Incision Site: Clean, dry and intact  Neurologic: Eyes closed, no gaze deviation, no command following, PERRL, +cough/gag, overbreathes vent set rate, localizes left arm briskly, withdraws left leg briskly, plegic on the right  Right wrist with mild edema and small bruise SUMMARY:   79 year-old woman with history of HTN, DM II and HLD initially presented to [facility] on 2/10/20 after being found unresponsive by family at 4 pm. Of note, she was last seen well at 9:30pm on 2/9/20. At baseline, she is independent and lives alone. At [facility], she was found to have right arm plegia and was globally aphasic. CT head revealed dense left MCA sign and acute left MCA infarction. CTA revealed a left M1 occlusion. She reportedly presented as a GCS 8 but declined to GCS 4, and was intubated for airway protection.    Transferred to [facility] ED. Then admitted to NSCU Service, but residing in the MICU.     2/11: was started on TF, ASA and DVT PPX    no acute overnight events     ICU Vital Signs Last 24 Hrs  T(C): 37.7 (12 Feb 2020 04:00), Max: 38.2 (11 Feb 2020 18:00)  T(F): 99.9 (12 Feb 2020 04:00), Max: 100.8 (11 Feb 2020 18:00)  HR: 69 (12 Feb 2020 07:00) (51 - 84)  BP: 180/74 (12 Feb 2020 07:00) (110/53 - 217/107)  BP(mean): 107 (12 Feb 2020 07:00) (77 - 150)  RR: 18 (12 Feb 2020 07:00) (15 - 28)  SpO2: 99% (12 Feb 2020 07:00) (99% - 100%)      02-11-20 @ 07:01  -  02-12-20 @ 07:00  --------------------------------------------------------  IN: 1744.8 mL / OUT: 800 mL / NET: 944.8 mL      Mode: AC/ CMV (Assist Control/ Continuous Mandatory Ventilation), RR (machine): 12, TV (machine): 500, FiO2: 40, PEEP: 5, ITime: 1, MAP: 8, PIP: 16  acetaminophen   Tablet .. 650 milliGRAM(s) Oral every 6 hours PRN  aspirin Suppository 300 milliGRAM(s) Rectal daily  atorvastatin 80 milliGRAM(s) Oral at bedtime  chlorhexidine 0.12% Liquid 15 milliLiter(s) Oral Mucosa every 12 hours  chlorhexidine 4% Liquid 1 Application(s) Topical <User Schedule>  dexMEDEtomidine Infusion 0.2 MICROgram(s)/kG/Hr (3.54 mL/Hr) IV Continuous <Continuous>  enoxaparin Injectable 40 milliGRAM(s) SubCutaneous <User Schedule>  insulin lispro (HumaLOG) corrective regimen sliding scale   SubCutaneous every 6 hours  metoprolol tartrate 50 milliGRAM(s) Oral two times a day  pantoprazole  Injectable 40 milliGRAM(s) IV Push daily  sodium chloride 2% . 1000 milliLiter(s) (75 mL/Hr) IV Continuous <Continuous>  valsartan 160 milliGRAM(s) Oral two times a day                            11.2   11.44 )-----------( 165      ( 11 Feb 2020 20:03 )             36.2     02-12    139  |  106  |  25<H>  ----------------------------<  218<H>  4.2   |  21<L>  |  0.57    Ca    8.4      12 Feb 2020 04:37  Phos  1.8     02-12  Mg     2.3     02-12    TPro  8.0  /  Alb  4.1  /  TBili  0.6  /  DBili  x   /  AST  31  /  ALT  50  /  AlkPhos  84  02-10    LIVER FUNCTIONS - ( 10 Feb 2020 17:46 )  Alb: 4.1 g/dL / Pro: 8.0 g/dL / ALK PHOS: 84 U/L / ALT: 50 U/L DA / AST: 31 U/L / GGT: x           ABG - ( 11 Feb 2020 20:04 )  pH, Arterial: 7.45  pH, Blood: x     /  pCO2: 33    /  pO2: 185   / HCO3: 23    / Base Excess: -.2   /  SaO2: 100           EXAMINATION:    General: No acute distress  HEENT: Anicteric sclerae  Cardiac: [id number]  Lungs: Clear  Abdomen: Soft, non-tender, +BS  Extremities: No c/c/e  Skin/Incision Site: Clean, dry and intact  Neurologic: Eyes closed, no gaze deviation, no command following, PERRL, +cough/gag, overbreathes vent set rate, localizes left arm briskly, withdraws left leg briskly, plegic on the right  Right wrist with mild edema and small bruise

## 2020-02-12 NOTE — SPEECH LANGUAGE PATHOLOGY EVALUATION - SLP DIAGNOSIS
Consult received and appreciated. Chart review initiated. As per chart review and discussion in NSCU rounds Pt not medically appropriate to be seen for evaluation at this time. Remains intubated, sedated, no following commands at this time. This service will continue to follow, as appropriate.

## 2020-02-12 NOTE — PROGRESS NOTE ADULT - ASSESSMENT
78 year old female presented with unresponsiveness at home. At [place] she was found to have R hemiparesis and global aphasia, consistent with a L MCA syndrome. Her CT head reveals an acute L MCA infarction. 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 Not a candidate for IV tPA due to out of window.     Recs:  Continue current treatment plan  MR head w/ contrast when stable

## 2020-02-12 NOTE — PROGRESS NOTE ADULT - ASSESSMENT
ASSESSMENT/PLAN: Left MCA stroke    NEURO:  Stroke work-up/core measures  Secondary Stroke prevention: ASA and statin   Delirium precautions  Dexmedetomidine and prn fentanyl for sedation  Activity: [] mobilize as tolerated [x] Bedrest [] PT [] OT [] PMNR    PULM:  Vent support  VAP bundle  CXR    CV:  SBP goal 120-180mmHg; reduce to 120-160mmHg of heme on CT    RENAL:  Fluids: IVF      GI:  Diet: On TF   GI prophylaxis [] not indicated [x] PPI: vented [] other:  Bowel regimen [] colace [] senna [] other:    ENDO:   Goal euglycemia (-180)  RISS    HEME/ONC:  VTE prophylaxis: [s] SCDs [] chemoprophylaxis - start if CT without heme [] hold chemoprophylaxis due to: [] high risk of DVT/PE on admission due to:    ID:  Monitor for fever    MISC:  Right wrist - x-ray to r/o fracture    SOCIAL/FAMILY:  [] awaiting [x] updated at bedside [] family meeting    CODE STATUS:  [x] Full Code [] DNR [] DNI [] Palliative/Comfort Care    DISPOSITION:  [x] ICU [] Stroke Unit [] Floor [] EMU [] RCU [] PCU    [x] Patient is at high risk of neurologic deterioration/death due to: seizure, intracerebral hemorrhage     Time spent: 45 [x] critical care minutes    Contact: [phone] ASSESSMENT/PLAN: Left MCA stroke    NEURO:  Stroke work-up/core measures  Secondary Stroke prevention: ASA and statin   Delirium precautions  EEG no seizures overnight   Dexmedetomidine and prn fentanyl for sedation  Activity: [] mobilize as tolerated [x] Bedrest [] PT [] OT [] PMNR    PULM:  Vent support  VAP bundle  CXR    CV:  SBP goal 120-180mmHg;     RENAL:  Fluids: IVF    GI:  Diet: On TF   GI prophylaxis [] not indicated [x] PPI: vented [] other:  Bowel regimen [] colace [] senna [] other:    ENDO:   Goal euglycemia (-180)  RISS    HEME/ONC:  VTE prophylaxis: [s] SCDs [x] chemoprophylaxis -  [] hold chemoprophylaxis due to: [] high risk of DVT/PE on admission due to:    ID:  Monitor for fever    MISC:  Right wrist - x-ray to r/o fracture    SOCIAL/FAMILY:  [] awaiting [x] updated at bedside [] family meeting    CODE STATUS:  [x] Full Code [] DNR [] DNI [] Palliative/Comfort Care    DISPOSITION:  [x] ICU [] Stroke Unit [] Floor [] EMU [] RCU [] PCU    [x] Patient is at high risk of neurologic deterioration/death due to: seizure, intracerebral hemorrhage     Time spent: 45 [x] critical care minutes    Contact: [phone]

## 2020-02-13 DIAGNOSIS — Z71.89 OTHER SPECIFIED COUNSELING: ICD-10-CM

## 2020-02-13 LAB
ANION GAP SERPL CALC-SCNC: 10 MMOL/L — SIGNIFICANT CHANGE UP (ref 5–17)
ANION GAP SERPL CALC-SCNC: 12 MMOL/L — SIGNIFICANT CHANGE UP (ref 5–17)
ANION GAP SERPL CALC-SCNC: 15 MMOL/L — SIGNIFICANT CHANGE UP (ref 5–17)
APPEARANCE UR: ABNORMAL
BILIRUB UR-MCNC: NEGATIVE — SIGNIFICANT CHANGE UP
BUN SERPL-MCNC: 23 MG/DL — SIGNIFICANT CHANGE UP (ref 7–23)
BUN SERPL-MCNC: 23 MG/DL — SIGNIFICANT CHANGE UP (ref 7–23)
BUN SERPL-MCNC: 24 MG/DL — HIGH (ref 7–23)
CALCIUM SERPL-MCNC: 8.3 MG/DL — LOW (ref 8.4–10.5)
CALCIUM SERPL-MCNC: 8.3 MG/DL — LOW (ref 8.4–10.5)
CALCIUM SERPL-MCNC: 8.5 MG/DL — SIGNIFICANT CHANGE UP (ref 8.4–10.5)
CHLORIDE SERPL-SCNC: 115 MMOL/L — HIGH (ref 96–108)
CHLORIDE SERPL-SCNC: 116 MMOL/L — HIGH (ref 96–108)
CHLORIDE SERPL-SCNC: 119 MMOL/L — HIGH (ref 96–108)
CK SERPL-CCNC: 139 U/L — SIGNIFICANT CHANGE UP (ref 25–170)
CK SERPL-CCNC: 160 U/L — SIGNIFICANT CHANGE UP (ref 25–170)
CK SERPL-CCNC: 166 U/L — SIGNIFICANT CHANGE UP (ref 25–170)
CO2 SERPL-SCNC: 20 MMOL/L — LOW (ref 22–31)
CO2 SERPL-SCNC: 21 MMOL/L — LOW (ref 22–31)
CO2 SERPL-SCNC: 22 MMOL/L — SIGNIFICANT CHANGE UP (ref 22–31)
COLOR SPEC: YELLOW — SIGNIFICANT CHANGE UP
CREAT SERPL-MCNC: 0.53 MG/DL — SIGNIFICANT CHANGE UP (ref 0.5–1.3)
CREAT SERPL-MCNC: 0.55 MG/DL — SIGNIFICANT CHANGE UP (ref 0.5–1.3)
CREAT SERPL-MCNC: 0.56 MG/DL — SIGNIFICANT CHANGE UP (ref 0.5–1.3)
DIFF PNL FLD: NEGATIVE — SIGNIFICANT CHANGE UP
GLUCOSE SERPL-MCNC: 278 MG/DL — HIGH (ref 70–99)
GLUCOSE SERPL-MCNC: 286 MG/DL — HIGH (ref 70–99)
GLUCOSE SERPL-MCNC: 298 MG/DL — HIGH (ref 70–99)
GLUCOSE UR QL: ABNORMAL
HCT VFR BLD CALC: 35.6 % — SIGNIFICANT CHANGE UP (ref 34.5–45)
HGB BLD-MCNC: 11.3 G/DL — LOW (ref 11.5–15.5)
KETONES UR-MCNC: SIGNIFICANT CHANGE UP
LEUKOCYTE ESTERASE UR-ACNC: ABNORMAL
MAGNESIUM SERPL-MCNC: 2.2 MG/DL — SIGNIFICANT CHANGE UP (ref 1.6–2.6)
MAGNESIUM SERPL-MCNC: 2.4 MG/DL — SIGNIFICANT CHANGE UP (ref 1.6–2.6)
MAGNESIUM SERPL-MCNC: 2.5 MG/DL — SIGNIFICANT CHANGE UP (ref 1.6–2.6)
MCHC RBC-ENTMCNC: 28.3 PG — SIGNIFICANT CHANGE UP (ref 27–34)
MCHC RBC-ENTMCNC: 31.7 GM/DL — LOW (ref 32–36)
MCV RBC AUTO: 89 FL — SIGNIFICANT CHANGE UP (ref 80–100)
NITRITE UR-MCNC: NEGATIVE — SIGNIFICANT CHANGE UP
NRBC # BLD: 0 /100 WBCS — SIGNIFICANT CHANGE UP (ref 0–0)
PH UR: 6 — SIGNIFICANT CHANGE UP (ref 5–8)
PHOSPHATE SERPL-MCNC: 1.9 MG/DL — LOW (ref 2.5–4.5)
PHOSPHATE SERPL-MCNC: 2.1 MG/DL — LOW (ref 2.5–4.5)
PHOSPHATE SERPL-MCNC: 2.2 MG/DL — LOW (ref 2.5–4.5)
PLATELET # BLD AUTO: 149 K/UL — LOW (ref 150–400)
POTASSIUM SERPL-MCNC: 3.5 MMOL/L — SIGNIFICANT CHANGE UP (ref 3.5–5.3)
POTASSIUM SERPL-MCNC: 3.5 MMOL/L — SIGNIFICANT CHANGE UP (ref 3.5–5.3)
POTASSIUM SERPL-MCNC: 3.7 MMOL/L — SIGNIFICANT CHANGE UP (ref 3.5–5.3)
POTASSIUM SERPL-SCNC: 3.5 MMOL/L — SIGNIFICANT CHANGE UP (ref 3.5–5.3)
POTASSIUM SERPL-SCNC: 3.5 MMOL/L — SIGNIFICANT CHANGE UP (ref 3.5–5.3)
POTASSIUM SERPL-SCNC: 3.7 MMOL/L — SIGNIFICANT CHANGE UP (ref 3.5–5.3)
PROT UR-MCNC: SIGNIFICANT CHANGE UP
RBC # BLD: 4 M/UL — SIGNIFICANT CHANGE UP (ref 3.8–5.2)
RBC # FLD: 13.5 % — SIGNIFICANT CHANGE UP (ref 10.3–14.5)
SODIUM SERPL-SCNC: 148 MMOL/L — HIGH (ref 135–145)
SODIUM SERPL-SCNC: 150 MMOL/L — HIGH (ref 135–145)
SODIUM SERPL-SCNC: 152 MMOL/L — HIGH (ref 135–145)
SP GR SPEC: 1.02 — SIGNIFICANT CHANGE UP (ref 1.01–1.02)
UROBILINOGEN FLD QL: NEGATIVE — SIGNIFICANT CHANGE UP
WBC # BLD: 11.12 K/UL — HIGH (ref 3.8–10.5)
WBC # FLD AUTO: 11.12 K/UL — HIGH (ref 3.8–10.5)

## 2020-02-13 PROCEDURE — 71045 X-RAY EXAM CHEST 1 VIEW: CPT | Mod: 26

## 2020-02-13 PROCEDURE — 99291 CRITICAL CARE FIRST HOUR: CPT

## 2020-02-13 PROCEDURE — 73090 X-RAY EXAM OF FOREARM: CPT | Mod: 26,LT

## 2020-02-13 PROCEDURE — 73120 X-RAY EXAM OF HAND: CPT | Mod: 26,RT

## 2020-02-13 PROCEDURE — 99292 CRITICAL CARE ADDL 30 MIN: CPT

## 2020-02-13 PROCEDURE — 95720 EEG PHY/QHP EA INCR W/VEEG: CPT

## 2020-02-13 RX ORDER — POLYETHYLENE GLYCOL 3350 17 G/17G
17 POWDER, FOR SOLUTION ORAL
Refills: 0 | Status: DISCONTINUED | OUTPATIENT
Start: 2020-02-13 | End: 2020-02-16

## 2020-02-13 RX ORDER — POTASSIUM CHLORIDE 20 MEQ
40 PACKET (EA) ORAL ONCE
Refills: 0 | Status: COMPLETED | OUTPATIENT
Start: 2020-02-13 | End: 2020-02-13

## 2020-02-13 RX ORDER — SENNA PLUS 8.6 MG/1
2 TABLET ORAL AT BEDTIME
Refills: 0 | Status: DISCONTINUED | OUTPATIENT
Start: 2020-02-13 | End: 2020-02-15

## 2020-02-13 RX ORDER — POTASSIUM PHOSPHATE, MONOBASIC POTASSIUM PHOSPHATE, DIBASIC 236; 224 MG/ML; MG/ML
30 INJECTION, SOLUTION INTRAVENOUS ONCE
Refills: 0 | Status: COMPLETED | OUTPATIENT
Start: 2020-02-13 | End: 2020-02-13

## 2020-02-13 RX ORDER — METOPROLOL TARTRATE 50 MG
25 TABLET ORAL
Refills: 0 | Status: DISCONTINUED | OUTPATIENT
Start: 2020-02-13 | End: 2020-02-14

## 2020-02-13 RX ORDER — HUMAN INSULIN 100 [IU]/ML
4 INJECTION, SUSPENSION SUBCUTANEOUS EVERY 6 HOURS
Refills: 0 | Status: DISCONTINUED | OUTPATIENT
Start: 2020-02-13 | End: 2020-02-14

## 2020-02-13 RX ORDER — CEFTRIAXONE 500 MG/1
1000 INJECTION, POWDER, FOR SOLUTION INTRAMUSCULAR; INTRAVENOUS EVERY 24 HOURS
Refills: 0 | Status: DISCONTINUED | OUTPATIENT
Start: 2020-02-13 | End: 2020-02-14

## 2020-02-13 RX ORDER — SODIUM CHLORIDE 5 G/100ML
1000 INJECTION, SOLUTION INTRAVENOUS
Refills: 0 | Status: DISCONTINUED | OUTPATIENT
Start: 2020-02-13 | End: 2020-02-13

## 2020-02-13 RX ORDER — AMLODIPINE BESYLATE 2.5 MG/1
5 TABLET ORAL DAILY
Refills: 0 | Status: DISCONTINUED | OUTPATIENT
Start: 2020-02-13 | End: 2020-02-14

## 2020-02-13 RX ORDER — ASPIRIN/CALCIUM CARB/MAGNESIUM 324 MG
81 TABLET ORAL DAILY
Refills: 0 | Status: DISCONTINUED | OUTPATIENT
Start: 2020-02-13 | End: 2020-02-16

## 2020-02-13 RX ORDER — HUMAN INSULIN 100 [IU]/ML
4 INJECTION, SUSPENSION SUBCUTANEOUS EVERY 6 HOURS
Refills: 0 | Status: DISCONTINUED | OUTPATIENT
Start: 2020-02-13 | End: 2020-02-13

## 2020-02-13 RX ORDER — INSULIN GLARGINE 100 [IU]/ML
5 INJECTION, SOLUTION SUBCUTANEOUS EVERY MORNING
Refills: 0 | Status: DISCONTINUED | OUTPATIENT
Start: 2020-02-13 | End: 2020-02-13

## 2020-02-13 RX ORDER — POTASSIUM PHOSPHATE, MONOBASIC POTASSIUM PHOSPHATE, DIBASIC 236; 224 MG/ML; MG/ML
15 INJECTION, SOLUTION INTRAVENOUS ONCE
Refills: 0 | Status: COMPLETED | OUTPATIENT
Start: 2020-02-13 | End: 2020-02-13

## 2020-02-13 RX ADMIN — ENOXAPARIN SODIUM 40 MILLIGRAM(S): 100 INJECTION SUBCUTANEOUS at 17:09

## 2020-02-13 RX ADMIN — Medication 50 MILLIGRAM(S): at 17:09

## 2020-02-13 RX ADMIN — Medication 4: at 00:28

## 2020-02-13 RX ADMIN — Medication 81 MILLIGRAM(S): at 11:54

## 2020-02-13 RX ADMIN — Medication 650 MILLIGRAM(S): at 22:00

## 2020-02-13 RX ADMIN — POLYETHYLENE GLYCOL 3350 17 GRAM(S): 17 POWDER, FOR SOLUTION ORAL at 17:09

## 2020-02-13 RX ADMIN — Medication 650 MILLIGRAM(S): at 14:44

## 2020-02-13 RX ADMIN — CHLORHEXIDINE GLUCONATE 1 APPLICATION(S): 213 SOLUTION TOPICAL at 22:00

## 2020-02-13 RX ADMIN — POTASSIUM PHOSPHATE, MONOBASIC POTASSIUM PHOSPHATE, DIBASIC 83.33 MILLIMOLE(S): 236; 224 INJECTION, SOLUTION INTRAVENOUS at 17:39

## 2020-02-13 RX ADMIN — AMLODIPINE BESYLATE 5 MILLIGRAM(S): 2.5 TABLET ORAL at 22:28

## 2020-02-13 RX ADMIN — Medication 10 MILLIGRAM(S): at 14:16

## 2020-02-13 RX ADMIN — Medication 4: at 17:35

## 2020-02-13 RX ADMIN — CEFTRIAXONE 100 MILLIGRAM(S): 500 INJECTION, POWDER, FOR SOLUTION INTRAMUSCULAR; INTRAVENOUS at 05:03

## 2020-02-13 RX ADMIN — Medication 650 MILLIGRAM(S): at 21:18

## 2020-02-13 RX ADMIN — Medication 10 MILLIGRAM(S): at 08:14

## 2020-02-13 RX ADMIN — Medication 40 MILLIEQUIVALENT(S): at 16:50

## 2020-02-13 RX ADMIN — SENNA PLUS 2 TABLET(S): 8.6 TABLET ORAL at 21:59

## 2020-02-13 RX ADMIN — Medication 10 MILLIGRAM(S): at 19:21

## 2020-02-13 RX ADMIN — POTASSIUM PHOSPHATE, MONOBASIC POTASSIUM PHOSPHATE, DIBASIC 62.5 MILLIMOLE(S): 236; 224 INJECTION, SOLUTION INTRAVENOUS at 08:15

## 2020-02-13 RX ADMIN — VALSARTAN 160 MILLIGRAM(S): 80 TABLET ORAL at 17:09

## 2020-02-13 RX ADMIN — VALSARTAN 160 MILLIGRAM(S): 80 TABLET ORAL at 05:03

## 2020-02-13 RX ADMIN — INSULIN GLARGINE 5 UNIT(S): 100 INJECTION, SOLUTION SUBCUTANEOUS at 09:08

## 2020-02-13 RX ADMIN — HUMAN INSULIN 4 UNIT(S): 100 INJECTION, SUSPENSION SUBCUTANEOUS at 20:18

## 2020-02-13 RX ADMIN — ATORVASTATIN CALCIUM 80 MILLIGRAM(S): 80 TABLET, FILM COATED ORAL at 21:59

## 2020-02-13 RX ADMIN — PANTOPRAZOLE SODIUM 40 MILLIGRAM(S): 20 TABLET, DELAYED RELEASE ORAL at 11:54

## 2020-02-13 RX ADMIN — CHLORHEXIDINE GLUCONATE 15 MILLILITER(S): 213 SOLUTION TOPICAL at 05:03

## 2020-02-13 RX ADMIN — Medication 50 MILLIGRAM(S): at 05:03

## 2020-02-13 RX ADMIN — Medication 6: at 11:54

## 2020-02-13 RX ADMIN — SODIUM CHLORIDE 50 MILLILITER(S): 5 INJECTION, SOLUTION INTRAVENOUS at 17:56

## 2020-02-13 RX ADMIN — Medication 4: at 05:17

## 2020-02-13 RX ADMIN — Medication 650 MILLIGRAM(S): at 15:49

## 2020-02-13 RX ADMIN — Medication 40 MILLIEQUIVALENT(S): at 02:39

## 2020-02-13 RX ADMIN — CHLORHEXIDINE GLUCONATE 15 MILLILITER(S): 213 SOLUTION TOPICAL at 17:09

## 2020-02-13 NOTE — PROGRESS NOTE ADULT - SUBJECTIVE AND OBJECTIVE BOX
SUMMARY:   79 year-old woman with history of HTN, DM II and HLD initially presented to [facility] on 2/10/20 after being found unresponsive by family at 4 pm. Of note, she was last seen well at 9:30pm on 2/9/20. At baseline, she is independent and lives alone. At [facility], she was found to have right arm plegia and was globally aphasic. CT head revealed dense left MCA sign and acute left MCA infarction. CTA revealed a left M1 occlusion. She reportedly presented as a GCS 8 but declined to GCS 4, and was intubated for airway protection.    Transferred to [facility] ED. Then admitted to NSCU Service, but residing in the MICU.     2/11: was started on TF, ASA and DVT PPX  2/12: was started on CPAP trials , EEG no seizure     no acute overnight events             ICU Vital Signs Last 24 Hrs  T(C): 37.8 (13 Feb 2020 04:00), Max: 38.1 (12 Feb 2020 16:00)  T(F): 100 (13 Feb 2020 04:00), Max: 100.6 (12 Feb 2020 16:00)  HR: 58 (13 Feb 2020 07:00) (57 - 91)  BP: 172/72 (13 Feb 2020 07:00) (147/67 - 194/79)  BP(mean): 104 (13 Feb 2020 07:00) (95 - 138)  RR: 19 (13 Feb 2020 07:00) (16 - 21)  SpO2: 98% (13 Feb 2020 07:00) (98% - 100%)      02-12-20 @ 07:01  -  02-13-20 @ 07:00  --------------------------------------------------------  IN: 3392.5 mL / OUT: 850 mL / NET: 2542.5 mL      Mode: CPAP with PS, FiO2: 40, PEEP: 5, PS: 10, MAP: 9, PIP: 15    acetaminophen   Tablet .. 650 milliGRAM(s) Oral every 6 hours PRN  aspirin Suppository 300 milliGRAM(s) Rectal daily  atorvastatin 80 milliGRAM(s) Oral at bedtime  cefTRIAXone   IVPB 1000 milliGRAM(s) IV Intermittent every 24 hours  chlorhexidine 0.12% Liquid 15 milliLiter(s) Oral Mucosa every 12 hours  chlorhexidine 4% Liquid 1 Application(s) Topical <User Schedule>  clocortolone 0.1% 1 Application(s) 1 Application(s) Topical two times a day  enoxaparin Injectable 40 milliGRAM(s) SubCutaneous <User Schedule>  fentaNYL    Injectable 25 MICROGram(s) IV Push every 4 hours PRN  hydrALAZINE Injectable 10 milliGRAM(s) IV Push every 4 hours PRN  insulin glargine Injectable (LANTUS) 5 Unit(s) SubCutaneous every morning  insulin lispro (HumaLOG) corrective regimen sliding scale   SubCutaneous every 6 hours  metoprolol tartrate 50 milliGRAM(s) Oral two times a day  pantoprazole  Injectable 40 milliGRAM(s) IV Push daily  potassium phosphate IVPB 15 milliMole(s) IV Intermittent once  sodium chloride 2% . 1000 milliLiter(s) (75 mL/Hr) IV Continuous <Continuous>  valsartan 160 milliGRAM(s) Oral two times a day                            11.3   11.12 )-----------( 149      ( 13 Feb 2020 00:47 )             35.6     02-13    148<H>  |  116<H>  |  23  ----------------------------<  298<H>  3.7   |  22  |  0.53    Ca    8.3<L>      13 Feb 2020 06:47  Phos  2.1     02-13  Mg     2.4     02-13        ABG - ( 11 Feb 2020 20:04 )  pH, Arterial: 7.45  pH, Blood: x     /  pCO2: 33    /  pO2: 185   / HCO3: 23    / Base Excess: -.2   /  SaO2: 100       CAPILLARY BLOOD GLUCOSE      POCT Blood Glucose.: 246 mg/dL (13 Feb 2020 05:14)  POCT Blood Glucose.: 248 mg/dL (13 Feb 2020 00:25)  POCT Blood Glucose.: 199 mg/dL (12 Feb 2020 17:10        EXAMINATION:    General: No acute distress  HEENT: Anicteric sclerae  Cardiac: [id number]  Lungs: Clear  Abdomen: Soft, non-tender, +BS  Extremities: No c/c/e  Skin/Incision Site: Clean, dry and intact  Neurologic: Eyes closed, no gaze deviation, no command following, PERRL, +cough/gag, overbreathes vent set rate, localizes left arm briskly, withdraws left leg briskly, plegic on the right  Right wrist with mild edema and small bruise

## 2020-02-13 NOTE — DISCHARGE NOTE NURSING/CASE MANAGEMENT/SOCIAL WORK - NSDCPEPTSTRK_GEN_ALL_CORE
Stroke warning signs and symptoms/Call 911 for stroke/Signs and symptoms of stroke/Need for follow up after discharge/Stroke education booklet/Prescribed medications/Risk factors for stroke/Stroke support groups for patients, families, and friends

## 2020-02-13 NOTE — PROGRESS NOTE ADULT - ASSESSMENT
ASSESSMENT/PLAN: Left MCA stroke    Stroke work-up/core measures  Secondary Stroke prevention: ASA and statin   Delirium precautions  minimize sedation  Vent support, pressure support as tolerated  VAP bundle  SBP goal 120-180mmHg  hypertonics for Na goal 140-150  SCDs, lovenox ppx  hyperglycemia, start NPH, cont sliding scale coverage

## 2020-02-13 NOTE — DISCHARGE NOTE NURSING/CASE MANAGEMENT/SOCIAL WORK - PATIENT PORTAL LINK FT
You can access the FollowMyHealth Patient Portal offered by [facility] by registering at the following website: [email or web address]. By joining [facility]’s FollowMyHealth portal, you will also be able to view your health information using other applications (apps) compatible with our system.

## 2020-02-13 NOTE — EEG REPORT - NS EEG TEXT BOX
[facility]  Comprehensive Epilepsy Center  Report of Continuous Video EEG    [facility]: [address] , [address], [place] [address], Ph#: [phone]  LI: [id number] [place] [address], Ph#: [phone]  Office: [address] [place] [address], [facility] [address], [place] [address] Ph#: [phone]    Patient Name: STORM BROWNING  Age: 79y Gender:  Female   MRN #: [id number], Location:  [address]    Study Date: 02 -12-20 08:00 to 02-13-20 08:00     Study Information:    EEG Recording Technique:  The patient underwent continuous Video-EEG monitoring, using Telemetry System hardware on the XLTek Digital System. EEG and video data were stored on a computer hard drive with important events saved in digital archive files. The material was reviewed by a physician (electroencephalographer / epileptologist) on a daily basis. Adrian and seizure detection algorithms were utilized and reviewed. An EEG Technician attended to the patient, and was available throughout daytime work hours.  The epilepsy center neurologist was available in person or on call 24-hours per day.    EEG Placement and Labeling of Electrodes:  The EEG was performed utilizing 20 channel referential EEG connections (coronal over temporal over parasagittal montage) using all standard 10-20 electrode placements with EKG, with additional electrodes placed in the inferior temporal region using the modified 10-10 montage electrode placements for elective admissions, or if deemed necessary. Recording was at a sampling rate of 256 samples per second per channel. Time synchronized digital video recording was done simultaneously with EEG recording. A low light infrared camera was used for low light recording.     History:  Medications: atorvastatin 80 milliGRAM(s) Oral at bedtime  chlorhexidine 0.12% Liquid 15 milliLiter(s) Oral Mucosa every 12 hours  chlorhexidine 4% Liquid 1 Application(s) Topical <User Schedule>  dexMEDEtomidine Infusion 0.2 MICROgram(s)/kG/Hr IV Continuous <Continuous>  insulin lispro (HumaLOG) corrective regimen sliding scale   SubCutaneous every 6 hours  metoprolol tartrate 25 milliGRAM(s) Oral two times a day  pantoprazole  Injectable 40 milliGRAM(s) IV Push daily  sodium chloride 0.9%. 1000 milliLiter(s) IV Continuous <Continuous>  valsartan 160 milliGRAM(s) Oral two times a day    Interpretation:  Daily EEG Visual Analysis    FINDINGS:  The background was continuous, spontaneously variable, and reactive. No posterior dominant rhythm seen.  There was more diffuse irregular theta/ polymorphic delta present.   Left sided attenuation of fast activity    Focal slowing: Continuous left hemispheric theta/ polymorphic delta present.     Sleep Background:  Normal stage II transients were not seen.    Interictal Epileptiform Abnormalities: rare left posterior quadrant ( max T7/P7) spike and wave discharges.     Ictal Abnormalities:  -No event captured.  -No electrographic seizures seen.     Activation Procedures:   Hyperventilation was not performed.    Photic stimulation was not performed.    Artifacts:  Intermittent myogenic artifacts were noted.    ECG:  The heart rate on single channel ECG was predominantly between 60-70 BPM.    _____________________________________________________________  EEG Classification / Summary:  Abnormal EEG in the sedated state due to:  1) Rare left posterior quadrant ( max T7/P7) spike and wave discharges.   2) Continuous left hemispheric theta/ polymorphic delta present.  Left sided attenuation of fast activity  3) Moderate diffuse slowing.     Clinical Impression:  1) Potential epileptogenic focus in the left posterior quadrant region.  2) Structural abnormality in the left hemisphere involving grey and white matter.   3) Moderate diffuse or multifocal cerebral dysfunction.    No electrographic seizures seen.  ______________________________________________________________    Lonnie Ludwig DO  Epilepsy Fellow, [facility] Epilepsy [facility]  Comprehensive Epilepsy Center  Report of Continuous Video EEG    [facility]: [address] , [address], [place] [address], Ph#: [phone]  LI: [id number] [place] [address], Ph#: [phone]  Office: [address] [place] [address], [facility] [address], [place] [address] Ph#: [phone]    Patient Name: STORM BROWNING  Age: 79y Gender:  Female   MRN #: [id number], Location:  [address]    Study Date: 02 -12-20 08:00 to 02-13-20 08:00     Study Information:    EEG Recording Technique:  The patient underwent continuous Video-EEG monitoring, using Telemetry System hardware on the XLTek Digital System. EEG and video data were stored on a computer hard drive with important events saved in digital archive files. The material was reviewed by a physician (electroencephalographer / epileptologist) on a daily basis. Adrian and seizure detection algorithms were utilized and reviewed. An EEG Technician attended to the patient, and was available throughout daytime work hours.  The epilepsy center neurologist was available in person or on call 24-hours per day.    EEG Placement and Labeling of Electrodes:  The EEG was performed utilizing 20 channel referential EEG connections (coronal over temporal over parasagittal montage) using all standard 10-20 electrode placements with EKG, with additional electrodes placed in the inferior temporal region using the modified 10-10 montage electrode placements for elective admissions, or if deemed necessary. Recording was at a sampling rate of 256 samples per second per channel. Time synchronized digital video recording was done simultaneously with EEG recording. A low light infrared camera was used for low light recording.     History:  Medications: atorvastatin 80 milliGRAM(s) Oral at bedtime  chlorhexidine 0.12% Liquid 15 milliLiter(s) Oral Mucosa every 12 hours  chlorhexidine 4% Liquid 1 Application(s) Topical <User Schedule>  dexMEDEtomidine Infusion 0.2 MICROgram(s)/kG/Hr IV Continuous <Continuous>  insulin lispro (HumaLOG) corrective regimen sliding scale   SubCutaneous every 6 hours  metoprolol tartrate 25 milliGRAM(s) Oral two times a day  pantoprazole  Injectable 40 milliGRAM(s) IV Push daily  sodium chloride 0.9%. 1000 milliLiter(s) IV Continuous <Continuous>  valsartan 160 milliGRAM(s) Oral two times a day    Interpretation:  Daily EEG Visual Analysis    FINDINGS:  The background was continuous, spontaneously variable, and reactive. No posterior dominant rhythm seen.  There was more diffuse irregular theta/ polymorphic delta present.     Left sided attenuation of fast activity  Focal slowing: Continuous left hemispheric theta/ polymorphic delta present.     Sleep Background:  Normal stage II transients were not seen.    Interictal Epileptiform Abnormalities: rare left posterior quadrant ( max T7/P7) spike and wave discharges.     Ictal Abnormalities:  -No event captured.  -No electrographic seizures seen.     Activation Procedures:   Hyperventilation was not performed.    Photic stimulation was not performed.    Artifacts:  Intermittent myogenic artifacts were noted.    ECG:  The heart rate on single channel ECG was predominantly between 60-70 BPM.    _____________________________________________________________  EEG Classification / Summary:  Abnormal EEG in the sedated state due to:  1) Rare left posterior quadrant ( max T7/P7) spike and wave discharges.   2) Continuous left hemispheric theta/ polymorphic delta present.  Left sided attenuation of fast activity  3) Moderate diffuse slowing.     Clinical Impression:  1) Potential epileptogenic focus in the left posterior quadrant region.  2) Structural abnormality in the left hemisphere involving grey and white matter.   3) Moderate diffuse or multifocal cerebral dysfunction.    No electrographic seizures seen.  ______________________________________________________________    Lonnie Ludwig DO  Epilepsy Fellow, [facility] Comprehensive Epilepsy Center    Kirit Richter MD FAES  Director, Continuous EEG Monitoring Service, [facility]    and Epilepsy Fellowship , Department of Neurology  [facility]

## 2020-02-13 NOTE — PROGRESS NOTE ADULT - ASSESSMENT
ASSESSMENT/PLAN: Left MCA stroke    NEURO:  Stroke work-up/core measures  Secondary Stroke prevention: ASA and statin   Delirium precautions  EEG no seizures overnight   Dexmedetomidine and prn fentanyl for sedation  Activity: [] mobilize as tolerated [x] Bedrest [] PT [] OT [] PMNR    PULM:  Vent support  VAP bundle  CXR  CPAP trial     CV:  SBP goal 120-180mmHg;   metoprolol tartrate 50 milliGRAM(s) Oral two times a day    RENAL:  Fluids: IVF  sodium chloride 2% . 1000 milliLiter(s) (75 mL/Hr) IV Continuous  GI:  Diet: On TF   GI prophylaxis [] not indicated [x] PPI: vented [] other:  Bowel regimen [] colace [] senna [] other:    ENDO:   Goal euglycemia (-180)  HBA1c: 8.1 , LDL 70  insulin glargine Injectable (LANTUS) 5 Unit(s) SubCutaneous every morning  insulin lispro (HumaLOG) corrective regimen sliding scale   SubCutaneous every 6 hours    HEME/ONC:  VTE prophylaxis: [s] SCDs [x] chemoprophylaxis -  [] hold chemoprophylaxis due to: [] high risk of DVT/PE on admission due to:    ID:  Tmax 38.1  UA +ve   started on CTX     MISC:  Right wrist - x-ray to r/o fracture    SOCIAL/FAMILY:  [] awaiting [x] updated at bedside [] family meeting    CODE STATUS:  [x] Full Code [] DNR [] DNI [] Palliative/Comfort Care    DISPOSITION:  [x] ICU [] Stroke Unit [] Floor [] EMU [] RCU [] PCU    [x] Patient is at high risk of neurologic deterioration/death due to: seizure, intracerebral hemorrhage     Time spent: 45 [x] critical care minutes    Contact: [phone]

## 2020-02-14 DIAGNOSIS — I48.91 UNSPECIFIED ATRIAL FIBRILLATION: ICD-10-CM

## 2020-02-14 DIAGNOSIS — R53.2 FUNCTIONAL QUADRIPLEGIA: ICD-10-CM

## 2020-02-14 DIAGNOSIS — Z71.89 OTHER SPECIFIED COUNSELING: ICD-10-CM

## 2020-02-14 DIAGNOSIS — Z51.5 ENCOUNTER FOR PALLIATIVE CARE: ICD-10-CM

## 2020-02-14 DIAGNOSIS — G93.40 ENCEPHALOPATHY, UNSPECIFIED: ICD-10-CM

## 2020-02-14 DIAGNOSIS — J96.90 RESPIRATORY FAILURE, UNSPECIFIED, UNSPECIFIED WHETHER WITH HYPOXIA OR HYPERCAPNIA: ICD-10-CM

## 2020-02-14 DIAGNOSIS — I63.9 CEREBRAL INFARCTION, UNSPECIFIED: ICD-10-CM

## 2020-02-14 LAB
ANION GAP SERPL CALC-SCNC: 10 MMOL/L — SIGNIFICANT CHANGE UP (ref 5–17)
ANION GAP SERPL CALC-SCNC: 11 MMOL/L — SIGNIFICANT CHANGE UP (ref 5–17)
ANION GAP SERPL CALC-SCNC: 14 MMOL/L — SIGNIFICANT CHANGE UP (ref 5–17)
BUN SERPL-MCNC: 23 MG/DL — SIGNIFICANT CHANGE UP (ref 7–23)
BUN SERPL-MCNC: 26 MG/DL — HIGH (ref 7–23)
BUN SERPL-MCNC: 28 MG/DL — HIGH (ref 7–23)
CALCIUM SERPL-MCNC: 8.3 MG/DL — LOW (ref 8.4–10.5)
CALCIUM SERPL-MCNC: 8.6 MG/DL — SIGNIFICANT CHANGE UP (ref 8.4–10.5)
CALCIUM SERPL-MCNC: 8.8 MG/DL — SIGNIFICANT CHANGE UP (ref 8.4–10.5)
CHLORIDE SERPL-SCNC: 111 MMOL/L — HIGH (ref 96–108)
CHLORIDE SERPL-SCNC: 112 MMOL/L — HIGH (ref 96–108)
CHLORIDE SERPL-SCNC: 119 MMOL/L — HIGH (ref 96–108)
CK SERPL-CCNC: 124 U/L — SIGNIFICANT CHANGE UP (ref 25–170)
CO2 SERPL-SCNC: 22 MMOL/L — SIGNIFICANT CHANGE UP (ref 22–31)
CO2 SERPL-SCNC: 23 MMOL/L — SIGNIFICANT CHANGE UP (ref 22–31)
CO2 SERPL-SCNC: 24 MMOL/L — SIGNIFICANT CHANGE UP (ref 22–31)
CREAT SERPL-MCNC: 0.54 MG/DL — SIGNIFICANT CHANGE UP (ref 0.5–1.3)
CREAT SERPL-MCNC: 0.59 MG/DL — SIGNIFICANT CHANGE UP (ref 0.5–1.3)
CREAT SERPL-MCNC: 0.67 MG/DL — SIGNIFICANT CHANGE UP (ref 0.5–1.3)
GAS PNL BLDA: SIGNIFICANT CHANGE UP
GAS PNL BLDA: SIGNIFICANT CHANGE UP
GLUCOSE SERPL-MCNC: 172 MG/DL — HIGH (ref 70–99)
GLUCOSE SERPL-MCNC: 266 MG/DL — HIGH (ref 70–99)
GLUCOSE SERPL-MCNC: 285 MG/DL — HIGH (ref 70–99)
HCT VFR BLD CALC: 36.6 % — SIGNIFICANT CHANGE UP (ref 34.5–45)
HGB BLD-MCNC: 11.2 G/DL — LOW (ref 11.5–15.5)
MAGNESIUM SERPL-MCNC: 2.2 MG/DL — SIGNIFICANT CHANGE UP (ref 1.6–2.6)
MAGNESIUM SERPL-MCNC: 2.3 MG/DL — SIGNIFICANT CHANGE UP (ref 1.6–2.6)
MAGNESIUM SERPL-MCNC: 2.4 MG/DL — SIGNIFICANT CHANGE UP (ref 1.6–2.6)
MCHC RBC-ENTMCNC: 27.8 PG — SIGNIFICANT CHANGE UP (ref 27–34)
MCHC RBC-ENTMCNC: 30.6 GM/DL — LOW (ref 32–36)
MCV RBC AUTO: 90.8 FL — SIGNIFICANT CHANGE UP (ref 80–100)
NRBC # BLD: 0 /100 WBCS — SIGNIFICANT CHANGE UP (ref 0–0)
PHOSPHATE SERPL-MCNC: 2.5 MG/DL — SIGNIFICANT CHANGE UP (ref 2.5–4.5)
PHOSPHATE SERPL-MCNC: 2.6 MG/DL — SIGNIFICANT CHANGE UP (ref 2.5–4.5)
PHOSPHATE SERPL-MCNC: 2.6 MG/DL — SIGNIFICANT CHANGE UP (ref 2.5–4.5)
PLATELET # BLD AUTO: 167 K/UL — SIGNIFICANT CHANGE UP (ref 150–400)
POTASSIUM SERPL-MCNC: 3.4 MMOL/L — LOW (ref 3.5–5.3)
POTASSIUM SERPL-MCNC: 3.5 MMOL/L — SIGNIFICANT CHANGE UP (ref 3.5–5.3)
POTASSIUM SERPL-MCNC: 4.9 MMOL/L — SIGNIFICANT CHANGE UP (ref 3.5–5.3)
POTASSIUM SERPL-SCNC: 3.4 MMOL/L — LOW (ref 3.5–5.3)
POTASSIUM SERPL-SCNC: 3.5 MMOL/L — SIGNIFICANT CHANGE UP (ref 3.5–5.3)
POTASSIUM SERPL-SCNC: 4.9 MMOL/L — SIGNIFICANT CHANGE UP (ref 3.5–5.3)
RBC # BLD: 4.03 M/UL — SIGNIFICANT CHANGE UP (ref 3.8–5.2)
RBC # FLD: 14.2 % — SIGNIFICANT CHANGE UP (ref 10.3–14.5)
SODIUM SERPL-SCNC: 145 MMOL/L — SIGNIFICANT CHANGE UP (ref 135–145)
SODIUM SERPL-SCNC: 149 MMOL/L — HIGH (ref 135–145)
SODIUM SERPL-SCNC: 152 MMOL/L — HIGH (ref 135–145)
TROPONIN T, HIGH SENSITIVITY RESULT: 11 NG/L — SIGNIFICANT CHANGE UP (ref 0–51)
WBC # BLD: 13.65 K/UL — HIGH (ref 3.8–10.5)
WBC # FLD AUTO: 13.65 K/UL — HIGH (ref 3.8–10.5)

## 2020-02-14 PROCEDURE — 99223 1ST HOSP IP/OBS HIGH 75: CPT | Mod: GC

## 2020-02-14 PROCEDURE — 99291 CRITICAL CARE FIRST HOUR: CPT

## 2020-02-14 PROCEDURE — 99292 CRITICAL CARE ADDL 30 MIN: CPT

## 2020-02-14 PROCEDURE — 71045 X-RAY EXAM CHEST 1 VIEW: CPT | Mod: 26

## 2020-02-14 PROCEDURE — 93010 ELECTROCARDIOGRAM REPORT: CPT | Mod: 76

## 2020-02-14 PROCEDURE — 99497 ADVNCD CARE PLAN 30 MIN: CPT | Mod: 25

## 2020-02-14 PROCEDURE — 70450 CT HEAD/BRAIN W/O DYE: CPT | Mod: 26

## 2020-02-14 RX ORDER — INSULIN HUMAN 100 [IU]/ML
1 INJECTION, SOLUTION SUBCUTANEOUS
Qty: 100 | Refills: 0 | Status: DISCONTINUED | OUTPATIENT
Start: 2020-02-14 | End: 2020-02-14

## 2020-02-14 RX ORDER — PIPERACILLIN AND TAZOBACTAM 4; .5 G/20ML; G/20ML
3.38 INJECTION, POWDER, LYOPHILIZED, FOR SOLUTION INTRAVENOUS ONCE
Refills: 0 | Status: COMPLETED | OUTPATIENT
Start: 2020-02-14 | End: 2020-02-14

## 2020-02-14 RX ORDER — HUMAN INSULIN 100 [IU]/ML
6 INJECTION, SUSPENSION SUBCUTANEOUS EVERY 6 HOURS
Refills: 0 | Status: DISCONTINUED | OUTPATIENT
Start: 2020-02-14 | End: 2020-02-14

## 2020-02-14 RX ORDER — AMIODARONE HYDROCHLORIDE 400 MG/1
150 TABLET ORAL ONCE
Refills: 0 | Status: COMPLETED | OUTPATIENT
Start: 2020-02-14 | End: 2020-02-14

## 2020-02-14 RX ORDER — POTASSIUM CHLORIDE 20 MEQ
20 PACKET (EA) ORAL
Refills: 0 | Status: DISCONTINUED | OUTPATIENT
Start: 2020-02-14 | End: 2020-02-14

## 2020-02-14 RX ORDER — FENTANYL CITRATE 50 UG/ML
25 INJECTION INTRAVENOUS
Refills: 0 | Status: DISCONTINUED | OUTPATIENT
Start: 2020-02-14 | End: 2020-02-15

## 2020-02-14 RX ORDER — AMIODARONE HYDROCHLORIDE 400 MG/1
0.5 TABLET ORAL
Qty: 900 | Refills: 0 | Status: DISCONTINUED | OUTPATIENT
Start: 2020-02-14 | End: 2020-02-15

## 2020-02-14 RX ORDER — DILTIAZEM HCL 120 MG
7 CAPSULE, EXT RELEASE 24 HR ORAL
Qty: 125 | Refills: 0 | Status: DISCONTINUED | OUTPATIENT
Start: 2020-02-14 | End: 2020-02-14

## 2020-02-14 RX ORDER — DILTIAZEM HCL 120 MG
5 CAPSULE, EXT RELEASE 24 HR ORAL ONCE
Refills: 0 | Status: COMPLETED | OUTPATIENT
Start: 2020-02-14 | End: 2020-02-14

## 2020-02-14 RX ORDER — POTASSIUM CHLORIDE 20 MEQ
40 PACKET (EA) ORAL ONCE
Refills: 0 | Status: COMPLETED | OUTPATIENT
Start: 2020-02-14 | End: 2020-02-14

## 2020-02-14 RX ORDER — METOPROLOL TARTRATE 50 MG
25 TABLET ORAL ONCE
Refills: 0 | Status: COMPLETED | OUTPATIENT
Start: 2020-02-14 | End: 2020-02-14

## 2020-02-14 RX ORDER — DILTIAZEM HCL 120 MG
17 CAPSULE, EXT RELEASE 24 HR ORAL ONCE
Refills: 0 | Status: COMPLETED | OUTPATIENT
Start: 2020-02-14 | End: 2020-02-14

## 2020-02-14 RX ORDER — METOPROLOL TARTRATE 50 MG
5 TABLET ORAL
Refills: 0 | Status: COMPLETED | OUTPATIENT
Start: 2020-02-14 | End: 2020-02-14

## 2020-02-14 RX ORDER — AMIODARONE HYDROCHLORIDE 400 MG/1
1 TABLET ORAL
Qty: 900 | Refills: 0 | Status: DISCONTINUED | OUTPATIENT
Start: 2020-02-14 | End: 2020-02-15

## 2020-02-14 RX ORDER — FUROSEMIDE 40 MG
10 TABLET ORAL ONCE
Refills: 0 | Status: COMPLETED | OUTPATIENT
Start: 2020-02-14 | End: 2020-02-14

## 2020-02-14 RX ORDER — INSULIN HUMAN 100 [IU]/ML
2 INJECTION, SOLUTION SUBCUTANEOUS
Qty: 100 | Refills: 0 | Status: DISCONTINUED | OUTPATIENT
Start: 2020-02-14 | End: 2020-02-15

## 2020-02-14 RX ORDER — PIPERACILLIN AND TAZOBACTAM 4; .5 G/20ML; G/20ML
3.38 INJECTION, POWDER, LYOPHILIZED, FOR SOLUTION INTRAVENOUS EVERY 8 HOURS
Refills: 0 | Status: DISCONTINUED | OUTPATIENT
Start: 2020-02-14 | End: 2020-02-15

## 2020-02-14 RX ORDER — METOPROLOL TARTRATE 50 MG
50 TABLET ORAL
Refills: 0 | Status: DISCONTINUED | OUTPATIENT
Start: 2020-02-14 | End: 2020-02-16

## 2020-02-14 RX ADMIN — VALSARTAN 160 MILLIGRAM(S): 80 TABLET ORAL at 05:09

## 2020-02-14 RX ADMIN — Medication 10 MILLIGRAM(S): at 00:14

## 2020-02-14 RX ADMIN — FENTANYL CITRATE 25 MICROGRAM(S): 50 INJECTION INTRAVENOUS at 22:55

## 2020-02-14 RX ADMIN — AMIODARONE HYDROCHLORIDE 16.67 MG/MIN: 400 TABLET ORAL at 19:28

## 2020-02-14 RX ADMIN — AMIODARONE HYDROCHLORIDE 33.33 MG/MIN: 400 TABLET ORAL at 08:45

## 2020-02-14 RX ADMIN — Medication 17 MILLIGRAM(S): at 12:05

## 2020-02-14 RX ADMIN — AMIODARONE HYDROCHLORIDE 600 MILLIGRAM(S): 400 TABLET ORAL at 08:27

## 2020-02-14 RX ADMIN — Medication 5 MILLIGRAM(S): at 06:56

## 2020-02-14 RX ADMIN — AMIODARONE HYDROCHLORIDE 16.67 MG/MIN: 400 TABLET ORAL at 14:47

## 2020-02-14 RX ADMIN — Medication 650 MILLIGRAM(S): at 03:32

## 2020-02-14 RX ADMIN — CEFTRIAXONE 100 MILLIGRAM(S): 500 INJECTION, POWDER, FOR SOLUTION INTRAMUSCULAR; INTRAVENOUS at 05:09

## 2020-02-14 RX ADMIN — CHLORHEXIDINE GLUCONATE 1 APPLICATION(S): 213 SOLUTION TOPICAL at 22:07

## 2020-02-14 RX ADMIN — Medication 25 MILLIGRAM(S): at 06:25

## 2020-02-14 RX ADMIN — FENTANYL CITRATE 25 MICROGRAM(S): 50 INJECTION INTRAVENOUS at 16:09

## 2020-02-14 RX ADMIN — Medication 40 MILLIEQUIVALENT(S): at 14:04

## 2020-02-14 RX ADMIN — Medication 7 MG/HR: at 13:49

## 2020-02-14 RX ADMIN — FENTANYL CITRATE 25 MICROGRAM(S): 50 INJECTION INTRAVENOUS at 11:00

## 2020-02-14 RX ADMIN — HUMAN INSULIN 6 UNIT(S): 100 INJECTION, SUSPENSION SUBCUTANEOUS at 07:56

## 2020-02-14 RX ADMIN — CHLORHEXIDINE GLUCONATE 15 MILLILITER(S): 213 SOLUTION TOPICAL at 18:19

## 2020-02-14 RX ADMIN — Medication 4: at 00:14

## 2020-02-14 RX ADMIN — FENTANYL CITRATE 25 MICROGRAM(S): 50 INJECTION INTRAVENOUS at 19:27

## 2020-02-14 RX ADMIN — ENOXAPARIN SODIUM 40 MILLIGRAM(S): 100 INJECTION SUBCUTANEOUS at 18:19

## 2020-02-14 RX ADMIN — FENTANYL CITRATE 25 MICROGRAM(S): 50 INJECTION INTRAVENOUS at 23:30

## 2020-02-14 RX ADMIN — FENTANYL CITRATE 25 MICROGRAM(S): 50 INJECTION INTRAVENOUS at 20:00

## 2020-02-14 RX ADMIN — VALSARTAN 160 MILLIGRAM(S): 80 TABLET ORAL at 18:20

## 2020-02-14 RX ADMIN — INSULIN HUMAN 2 UNIT(S)/HR: 100 INJECTION, SOLUTION SUBCUTANEOUS at 19:27

## 2020-02-14 RX ADMIN — Medication 25 MILLIGRAM(S): at 05:39

## 2020-02-14 RX ADMIN — ATORVASTATIN CALCIUM 80 MILLIGRAM(S): 80 TABLET, FILM COATED ORAL at 22:07

## 2020-02-14 RX ADMIN — PANTOPRAZOLE SODIUM 40 MILLIGRAM(S): 20 TABLET, DELAYED RELEASE ORAL at 12:27

## 2020-02-14 RX ADMIN — Medication 650 MILLIGRAM(S): at 23:00

## 2020-02-14 RX ADMIN — INSULIN HUMAN 2 UNIT(S)/HR: 100 INJECTION, SOLUTION SUBCUTANEOUS at 13:55

## 2020-02-14 RX ADMIN — PIPERACILLIN AND TAZOBACTAM 200 GRAM(S): 4; .5 INJECTION, POWDER, LYOPHILIZED, FOR SOLUTION INTRAVENOUS at 12:26

## 2020-02-14 RX ADMIN — Medication 40 MILLIEQUIVALENT(S): at 06:45

## 2020-02-14 RX ADMIN — Medication 650 MILLIGRAM(S): at 13:04

## 2020-02-14 RX ADMIN — POLYETHYLENE GLYCOL 3350 17 GRAM(S): 17 POWDER, FOR SOLUTION ORAL at 05:09

## 2020-02-14 RX ADMIN — Medication 6: at 06:30

## 2020-02-14 RX ADMIN — HUMAN INSULIN 6 UNIT(S): 100 INJECTION, SUSPENSION SUBCUTANEOUS at 01:42

## 2020-02-14 RX ADMIN — Medication 650 MILLIGRAM(S): at 12:34

## 2020-02-14 RX ADMIN — CHLORHEXIDINE GLUCONATE 15 MILLILITER(S): 213 SOLUTION TOPICAL at 05:09

## 2020-02-14 RX ADMIN — Medication 650 MILLIGRAM(S): at 19:24

## 2020-02-14 RX ADMIN — FENTANYL CITRATE 25 MICROGRAM(S): 50 INJECTION INTRAVENOUS at 15:54

## 2020-02-14 RX ADMIN — FENTANYL CITRATE 25 MICROGRAM(S): 50 INJECTION INTRAVENOUS at 11:15

## 2020-02-14 RX ADMIN — Medication 5 MILLIGRAM(S): at 06:05

## 2020-02-14 RX ADMIN — SENNA PLUS 2 TABLET(S): 8.6 TABLET ORAL at 22:07

## 2020-02-14 RX ADMIN — Medication 81 MILLIGRAM(S): at 12:27

## 2020-02-14 RX ADMIN — Medication 650 MILLIGRAM(S): at 04:15

## 2020-02-14 RX ADMIN — PIPERACILLIN AND TAZOBACTAM 25 GRAM(S): 4; .5 INJECTION, POWDER, LYOPHILIZED, FOR SOLUTION INTRAVENOUS at 18:19

## 2020-02-14 RX ADMIN — Medication 5 MILLIGRAM(S): at 05:50

## 2020-02-14 RX ADMIN — Medication 10 MILLIGRAM(S): at 11:27

## 2020-02-14 RX ADMIN — Medication 20 MILLIEQUIVALENT(S): at 05:09

## 2020-02-14 NOTE — PROGRESS NOTE ADULT - PROBLEM SELECTOR PLAN 1
Transferred from [facility] with acute CVA. Intubated for airway protection. Patient with right sided hemiparesis on exam. Intermittent response to commands, however, overall with poor neuro exam.     Patient was not a TPA or thrombectomy candidate.

## 2020-02-14 NOTE — CONSULT NOTE ADULT - ASSESSMENT
PAF  agree with amio  monitor TSH, LFT     Respiratory failure   on vent  suspect underlying PNA  consider anbx     CVA  fu with neurology  eventual a/c once deemed safe

## 2020-02-14 NOTE — PROGRESS NOTE ADULT - ASSESSMENT
78 year old female presented with unresponsiveness at home. At [place] she was found to have R hemiparesis and global aphasia, consistent with a L MCA syndrome. Her CT head reveals an acute L MCA infarction. 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 Not a candidate for IV tPA due to out of window.     Called by NSCU team to speak with daughter related to GOC 2/2 to catastrophic CVA.

## 2020-02-14 NOTE — PROGRESS NOTE ADULT - PROBLEM SELECTOR PLAN 4
Controlled on assessment. Periods of rapid afib. Patient remains on Amiodarone and Cardizem drips at this time.

## 2020-02-14 NOTE — CONSULT NOTE ADULT - PROBLEM SELECTOR RECOMMENDATION 9
Transferred from [facility] with acute CVA. Intubated for airway protection. Patient with right sided hemiparesis on exam. Intermittent response to commands, however, overall with poor neuro exam.     Patient was not a TPA or thrombectomy candidate.

## 2020-02-14 NOTE — CONSULT NOTE ADULT - SUBJECTIVE AND OBJECTIVE BOX
78 y/o woman w/ h/o HTN, DM II and HLD initially presented to [facility] on 2/10/20 after being found unresponsive by family at 4 pm. LKN 9:30pm on 2/9/20. At baseline, she is independent and lives alone. At [facility] she was noted to be globally aphasic with RUE hemiplegia. CT head revealed dense L. MCA sign & acute L. MCA infarction. CTA revealed a L.M1 occlusion. She reportedly presented as a GCS 8 but declined to GCS 4, and was intubated for airway protection on 02/10.  Transferred to [facility] ED, remained intubated, admitted to NSCU service, originally boarded in MICU, being transferred to NSCU 02/14.  Was not a thrombectomy or tPA candidate    Imaging:  Evolving left MCA infarct now with increased mass effect on the   left lateral ventricle and increase midline shift of roughly 8 mm     Exam:  intubated, Eo to voice, not FC, RUE ext, RLE Tf. L side spon.   --Anticoagulation:  aspirin  chewable 81 milliGRAM(s) Oral daily  enoxaparin Injectable 40 milliGRAM(s) SubCutaneous <User Schedule>    =====================  PAST MEDICAL HISTORY   Basal cell carcinoma  Gallbladder polyp  Chronic bronchitis, simple  Dyslipidemia  Diabetes  Hypertension    PAST SURGICAL HISTORY   S/P colonoscopy  S/P cataract extraction, unspecified laterality  S/P hysterectomy with oophorectomy  S/P breast biopsy, right        MEDICATIONS:  Antibiotics:  cefTRIAXone   IVPB 1000 milliGRAM(s) IV Intermittent every 24 hours    Neuro:  acetaminophen   Tablet .. 650 milliGRAM(s) Oral every 6 hours PRN  fentaNYL    Injectable 25 MICROGram(s) IV Push every 4 hours PRN    Other:  aMIOdarone Infusion 1 mG/Min IV Continuous <Continuous>  aMIOdarone Infusion 0.5 mG/Min IV Continuous <Continuous>  amLODIPine   Tablet 5 milliGRAM(s) Oral daily  atorvastatin 80 milliGRAM(s) Oral at bedtime  furosemide   Injectable 10 milliGRAM(s) IV Push once  hydrALAZINE Injectable 10 milliGRAM(s) IV Push every 4 hours PRN  insulin lispro (HumaLOG) corrective regimen sliding scale   SubCutaneous every 6 hours  insulin NPH human recombinant 6 Unit(s) SubCutaneous every 6 hours  metoprolol tartrate 50 milliGRAM(s) Oral two times a day  pantoprazole  Injectable 40 milliGRAM(s) IV Push daily  polyethylene glycol 3350 17 Gram(s) Oral two times a day  senna 2 Tablet(s) Oral at bedtime  valsartan 160 milliGRAM(s) Oral two times a day      SOCIAL HISTORY:   Occupation:   Marital Status:     FAMILY HISTORY:  Family history of colon cancer (Sibling)  Family history of CVA  Family history of borderline diabetes mellitus      ROS: Negative except per HPI    LABS:                          11.2   13.65 )-----------( 167      ( 14 Feb 2020 04:10 )             36.6     02-14    152<H>  |  119<H>  |  26<H>  ----------------------------<  266<H>  3.4<L>   |  22  |  0.54    Ca    8.6      14 Feb 2020 04:10  Phos  2.6     02-14  Mg     2.4     02-14

## 2020-02-14 NOTE — PROGRESS NOTE ADULT - SUBJECTIVE AND OBJECTIVE BOX
afib w/ rvr  worsening cytotoxic cerebral edema on CTH    VITALS/DATA/ORDERS: [x] Reviewed    EXAMINATION:  intubated, EO to noxious, no gaze deviation, no command following, PERRL, +cough/gag, overbreathes vent set rate, localizes left arm briskly, RUE plegic, BLE WD L>R

## 2020-02-14 NOTE — CONSULT NOTE ADULT - ATTENDING COMMENTS
VASCULAR NEUROLOGY ATTENDING  The patient is seen and examined the history and imaging are reviewed. I agree with the resident note unless otherwise noted. Agree with supportive care.
Patient seen and examined and agree with the above documentation with the following additions:   Patient with acute CVA, now with poor neurologic exam. Intubated, right sided paresis, and unable to follow commands.  Per NSCU team, patient will remain with severe debility, likely requiring long term vent/peg. Discussion with daughter regarding GOC, see ACP above.  DNR established and MOLST completed, >16 minutes spent on ACP. Daughter still considering options at this time, conflicting regarding withdrawal of life sustaining treatment but knows this is not a quality of life that her mother would find acceptable. will continue to follow.

## 2020-02-14 NOTE — PROGRESS NOTE ADULT - PROBLEM SELECTOR PLAN 5
S/p acute ischemic stroke. Patient minimally responsive to commands and it was unclear if movements were purposeful.

## 2020-02-14 NOTE — PROVIDER CONTACT NOTE (OTHER) - ASSESSMENT
Pt HR maintained from 55-70 during the course of the shift overnight 2/13 going into 2/14. Around 0530, after being repositioned, pt HR increased up to 170. EKG showed afib with RVR. Neuro PA called, and at bedside.

## 2020-02-14 NOTE — CONSULT NOTE ADULT - PROBLEM SELECTOR RECOMMENDATION 5
Controlled on assessment. Periods of rapid afib. Patient remains on Amiodarone and Cardizem drips at this time

## 2020-02-14 NOTE — CONSULT NOTE ADULT - ASSESSMENT
78 year old female presented with unresponsiveness at home. At [place] she was found to have R hemiparesis and global aphasia, consistent with a L MCA syndrome. Her CT head reveals an acute L MCA infarction. 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 Not a candidate for IV tPA due to out of window.     Called by NSCU team to speak with daughter related to GOC 2/2 to catastrophic CVA.

## 2020-02-14 NOTE — CONSULT NOTE ADULT - PROBLEM SELECTOR RECOMMENDATION 6
Called back to the room [facility] the [facility] and daughter was at the bedside. Discussion with daughter about her mother and goals of care. She states that she knows for sure her mother would never have wanted a life attached to machines and that she has no plans for trach/PEG. 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 She states her mother was depressed at one time, but always has been consistent that she would never want a life that made her bedbound. She witnessed another family member who suffered a stroke and was bedbound for months and was clear this was not what she would have ever wanted. Daughter, however, remains feeling conflicted about "making her mother die" if she decides to remove the machines, however, is consistent this is not a way of life for her. The patient did show some signs of neurologic changes today in that she is somewhat responsive to commands today which she hasn't been all week. Unclear if movements are completely purposeful, however, it seems appropriate for her to see what the next few days brings given this new change today. Daughter aware that the team will follow up over the weekend and that I will meet with her again on Tuesday. Discussed compassionate vs. medical extubations. Discussed a DNR which the daughter is agreeable to at this time given that the risks of CPR are significant and could worsen her mother's condition. ENRICO done with DNR. Lack of capacity form filled out. [facility] and daughter was at the bedside. Discussion with daughter about her mother and goals of care. She states that she knows for sure her mother would never have wanted a life attached to machines and that she has no plans for trach/PEG. 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 She states her mother was depressed at one time, but always has been consistent that she would never want a life that made her bedbound. She witnessed another family member who suffered a stroke and was bedbound for months and was clear this was not what she would have ever wanted. Daughter, however, remains feeling conflicted about "making her mother die" if she decides to remove the machines, however, is consistent this is not a way of life for her. The patient did show some signs of neurologic changes today in that she is somewhat responsive to commands today which she hasn't been all week. Unclear if movements are completely purposeful, however, it seems appropriate for her to see what the next few days brings given this new change today. Daughter aware that the team will follow up over the weekend and that I will meet with her again on Tuesday. Discussed compassionate vs. medical extubations. Discussed a DNR which the daughter is agreeable to at this time given that the risks of CPR are significant and could worsen her mother's condition. AFSANEHST done with DNR. Lack of capacity form filled out. >16 minutes spent on ACP    Phone number: [phone]

## 2020-02-14 NOTE — CONSULT NOTE ADULT - SUBJECTIVE AND OBJECTIVE BOX
· Subjective and Objective:   HPI:  79 year old female with PMH of HTN, DM, HLD presents to the [facility] ED as a stroke transfer. She initially presented to [facility]. Her daughter found her unresponsive at 4pm with Right sided weakness. LKN was 9:30 pm on . At baseline, she is independent and lives alone. 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 CT head revealed dense L MCA sign and acute L MCA infarction. CTA h/n revealed a L M1 occlusion. At [facility], her GCS fell from 8 to 4 and patient was intubated. No TPA given and no thrombectomy offered due to outside window.  NIHSS 19. MRS 0. (10 Feb 2020 23:55)    PERTINENT PM/SXH:   Basal cell carcinoma  Gallbladder polyp  Chronic bronchitis, simple  Dyslipidemia  Diabetes  Hypertension    S/P colonoscopy  S/P cataract extraction, unspecified laterality  S/P hysterectomy with oophorectomy  S/P breast biopsy, right    FAMILY HISTORY:  Family history of colon cancer (Sibling)  Family history of CVA  Family history of borderline diabetes mellitus    ITEMS NOT CHECKED ARE NOT PRESENT    SOCIAL HISTORY:   Significant other/partner:  [ ]  Children:  [ ]  Denominational/Spirituality:  Substance hx:  [ ]   Tobacco hx:  [ ]   Alcohol hx: [ ]   Home Opioid hx:  [ ] I-Stop Reference No:  Living Situation: [ ]Home  [ ]Long term care  [ ]Rehab [ ]Other    ADVANCE DIRECTIVES:    DNR  MOLST  [ ]  Living Will  [ ]   DECISION MAKER(s):  [ ] Health Care Proxy(s)  [x ] Surrogate(s)-as per GILBERTO Ruiz, daughter is the surrogate-unable to reach her  [ ] Guardian           Name(s): Phone Number(s):    BASELINE (I)ADL(s) (prior to admission):  [place]: [ x]Total  [ ] Moderate [ ]Dependent    Allergies    No Known Allergies    Intolerances    MEDICATIONS  (STANDING):  aMIOdarone Infusion 1 mG/Min (33.333 mL/Hr) IV Continuous <Continuous>  aMIOdarone Infusion 0.5 mG/Min (16.667 mL/Hr) IV Continuous <Continuous>  aspirin  chewable 81 milliGRAM(s) Oral daily  atorvastatin 80 milliGRAM(s) Oral at bedtime  chlorhexidine 0.12% Liquid 15 milliLiter(s) Oral Mucosa every 12 hours  chlorhexidine 4% Liquid 1 Application(s) Topical <User Schedule>  clocortolone 0.1% 1 Application(s) 1 Application(s) Topical two times a day  diltiazem Infusion 7 mG/Hr (7 mL/Hr) IV Continuous <Continuous>  enoxaparin Injectable 40 milliGRAM(s) SubCutaneous <User Schedule>  insulin regular Infusion 2 Unit(s)/Hr (2 mL/Hr) IV Continuous <Continuous>  metoprolol tartrate 50 milliGRAM(s) Oral two times a day  pantoprazole  Injectable 40 milliGRAM(s) IV Push daily  piperacillin/tazobactam IVPB.. 3.375 Gram(s) IV Intermittent every 8 hours  polyethylene glycol 3350 17 Gram(s) Oral two times a day  senna 2 Tablet(s) Oral at bedtime  valsartan 160 milliGRAM(s) Oral two times a day    MEDICATIONS  (PRN):  acetaminophen   Tablet .. 650 milliGRAM(s) Oral every 6 hours PRN Temp greater or equal to 38C (100.4F)  fentaNYL    Injectable 25 MICROGram(s) IV Push every 4 hours PRN restlessness    PRESENT SYMPTOMS: [x ]Unable to obtain due to poor mentation   Source if other than patient:  [ ]Family   [ ]Team     Pain: [ ] yes [ ] no  QOL impact -   Location -                    Aggravating factors -  Quality -  Radiation -  Timing-  Severity (0-10 scale):  Minimal acceptable level (0-10 scale):     PAIN AD Score: 4    [email or web address] (press ctrl +  left click to view)    Dyspnea:                           [ ]Mild [ ]Moderate [ ]Severe  Anxiety:                             [ ]Mild [ ]Moderate [ ]Severe  Fatigue:                             [ ]Mild [ ]Moderate [ ]Severe  Nausea:                             [ ]Mild [ ]Moderate [ ]Severe  Loss of appetite:              [ ]Mild [ ]Moderate [ ]Severe  Constipation:                    [ ]Mild [ ]Moderate [ ]Severe    Other Symptoms:  [ ]All other review of systems negative     Karnofsky Performance Score/Palliative Performance Status Version 2:        10%    http://npcrc.org/files/news/palliative_performance_scale_ppsv2.pdf  PHYSICAL EXAM:  Vital Signs Last 24 Hrs  T(C): 37.4 (2020 08:45), Max: 38.2 (2020 20:00)  T(F): 99.4 (2020 08:45), Max: 100.8 (2020 20:00)  HR: 61 (2020 14:15) (53 - 148)  BP: 119/53 (2020 14:15) (119/53 - 199/82)  BP(mean): 72 (2020 14:15) (72 - 140)  RR: 20 (2020 14:15) (18 - 27)  SpO2: 97% (2020 14:15) (96% - 100%) I&O's Summary    2020 07:01  -  2020 07:00  --------------------------------------------------------  IN: 3833.1 mL / OUT: 1900 mL / NET: 1933.1 mL    2020 07:01  -  2020 14:50  --------------------------------------------------------  IN: 946.8 mL / OUT: 350 mL / NET: 596.8 mL    GENERAL:  [ ]Alert  [ ]Oriented x   [ x]Lethargic  [ ]Cachexia  [ ]Unarousable  [ ]Verbal  [x ]Non-Verbal   Behavioral:   [ ] Anxiety  [ ] Delirium [x ] Agitation [ ] Other  HEENT:  [ ]Normal   [ ]Dry mouth   [x ]ET Tube/Trach  [ ]Oral lesions  PULMONARY:   [ ]Clear [ ]Tachypnea  [ ]Audible excessive secretions   [x ]Rhonchi        [ ]Right [ ]Left [ ]Bilateral  [ ]Crackles        [ ]Right [ ]Left [ ]Bilateral  [ ]Wheezing     [ ]Right [ ]Left [ ]Bilateral  CARDIOVASCULAR:    [x ]Regular [ ]Irregular [ ]Tachy  [ ]Fan [ ]Murmur [ ]Other  GASTROINTESTINAL:  [x ]Soft  [ ]Distended   [ x]+BS  [ ]Non tender [ ]Tender  [ ]PEG [ ]OGT/ NGT  Last BM: 20  GENITOURINARY:  [ ]Normal [ ] Incontinent   [ ]Oliguria/Anuria   [x ]Michael  MUSCULOSKELETAL:   [ ]Normal   [ ]Weakness  [x ]Bed/Wheelchair bound [ x]Edema-right arm  NEUROLOGIC:   [ ]No focal deficits  x[ ] Cognitive impairment  [x ] Dysphagia [ ]Dysarthria [ ] Paresis [ ]Other   SKIN:   [ ]Normal   [ ]Pressure ulcer(s)  [ ]Rash    CRITICAL CARE:  [ ] Shock Present  [ ]Septic [ ]Cardiogenic [ ]Neurologic [ ]Hypovolemic  [ ]  Vasopressors [ ]  Inotropes   [x ] Respiratory failure present [x ] mechanical ventilation [ ] non-invasive ventilatory support [ ] High flow  [x ] Acute  [ ] Chronic [ ] Hypoxic  [ ] Hypercarbic [ ] Other  [ ] Other organ failure     LABS:                        11.2   13.65 )-----------( 167      ( 2020 04:10 )             36.6   02-14    149<H>  |  112<H>  |  23  ----------------------------<  285<H>  3.5   |  23  |  0.59    Ca    8.8      2020 12:50  Phos  2.5       Mg     2.2             Urinalysis Basic - ( 2020 03:20 )    Color: Yellow / Appearance: Slightly Turbid / S.023 / pH: x  Gluc: x / Ketone: Trace  / Bili: Negative / Urobili: Negative   Blood: x / Protein: Trace / Nitrite: Negative   Leuk Esterase: Large / RBC: 8 /hpf / WBC 56 /HPF   Sq Epi: x / Non Sq Epi: 0 /hpf / Bacteria: Many      RADIOLOGY & ADDITIONAL STUDIES:    PROTEIN CALORIE MALNUTRITION PRESENT: [ ] Yes [ ] No  [x ] PPSV2 < or = to 30% [ ] significant weight loss  [ ] poor nutritional intake [ ] catabolic state [ ] anasarca     Albumin, Serum: 4.1 g/dL (02-10-20 @ 17:46)  Artificial Nutrition [ ]     REFERRALS:   [ ]Chaplaincy  [ ] Hospice  [ ]Child Life  [ ]Social Work  [ ]Case management [ ]Holistic Therapy     Goals of Care Document:   Care Coordination Assessment [C. Provider] (20 @ 11:45)   Progress Notes - Care Coordination [C. Provider] (20 @ 14:18) · Subjective and Objective:   HPI:  79 year old female with PMH of HTN, DM, HLD presents to the [facility] ED as a stroke transfer. She initially presented to [facility]. Her daughter found her unresponsive at 4pm with Right sided weakness. LKN was 9:30 pm on . At baseline, she is independent and lives alone. 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 CT head revealed dense L MCA sign and acute L MCA infarction. CTA h/n revealed a L M1 occlusion. At [facility], her GCS fell from 8 to 4 and patient was intubated. No TPA given and no thrombectomy offered due to outside window.  NIHSS 19. MRS 0. (10 Feb 2020 23:55)    PERTINENT PM/SXH:   Basal cell carcinoma  Gallbladder polyp  Chronic bronchitis, simple  Dyslipidemia  Diabetes  Hypertension    S/P colonoscopy  S/P cataract extraction, unspecified laterality  S/P hysterectomy with oophorectomy  S/P breast biopsy, right    FAMILY HISTORY:  Family history of colon cancer (Sibling)  Family history of CVA  Family history of borderline diabetes mellitus    ITEMS NOT CHECKED ARE NOT PRESENT    SOCIAL HISTORY:   Significant other/partner:  [ ]  Children:  [x ]  Protestant/Spirituality:  Substance hx:  [ ]   Tobacco hx:  [ ]   Alcohol hx: [ ]   Home Opioid hx:  [ ] I-Stop Reference No:  Living Situation: [ x]Home  [ ]Long term care  [ ]Rehab [ ]Other    ADVANCE DIRECTIVES:    DNR  MOLST  [ ]  Living Will  [ ]   DECISION MAKER(s):  [ ] Health Care Proxy(s)  [x ] Surrogate(s)-as per GILBERTO Ruiz, daughter is the surrogater  [ ] Guardian           Name(s): Phone Number(s):   Phone number: [phone]    BASELINE (I)ADL(s) (prior to admission):  [place]: [ x]Total  [ ] Moderate [ ]Dependent    Allergies    No Known Allergies    Intolerances    MEDICATIONS  (STANDING):  aMIOdarone Infusion 1 mG/Min (33.333 mL/Hr) IV Continuous <Continuous>  aMIOdarone Infusion 0.5 mG/Min (16.667 mL/Hr) IV Continuous <Continuous>  aspirin  chewable 81 milliGRAM(s) Oral daily  atorvastatin 80 milliGRAM(s) Oral at bedtime  chlorhexidine 0.12% Liquid 15 milliLiter(s) Oral Mucosa every 12 hours  chlorhexidine 4% Liquid 1 Application(s) Topical <User Schedule>  clocortolone 0.1% 1 Application(s) 1 Application(s) Topical two times a day  diltiazem Infusion 7 mG/Hr (7 mL/Hr) IV Continuous <Continuous>  enoxaparin Injectable 40 milliGRAM(s) SubCutaneous <User Schedule>  insulin regular Infusion 2 Unit(s)/Hr (2 mL/Hr) IV Continuous <Continuous>  metoprolol tartrate 50 milliGRAM(s) Oral two times a day  pantoprazole  Injectable 40 milliGRAM(s) IV Push daily  piperacillin/tazobactam IVPB.. 3.375 Gram(s) IV Intermittent every 8 hours  polyethylene glycol 3350 17 Gram(s) Oral two times a day  senna 2 Tablet(s) Oral at bedtime  valsartan 160 milliGRAM(s) Oral two times a day    MEDICATIONS  (PRN):  acetaminophen   Tablet .. 650 milliGRAM(s) Oral every 6 hours PRN Temp greater or equal to 38C (100.4F)  fentaNYL    Injectable 25 MICROGram(s) IV Push every 4 hours PRN restlessness    PRESENT SYMPTOMS: [x ]Unable to obtain due to poor mentation   Source if other than patient:  [ ]Family   [ ]Team     Pain: [ ] yes [ ] no  QOL impact -   Location -                    Aggravating factors -  Quality -  Radiation -  Timing-  Severity (0-10 scale):  Minimal acceptable level (0-10 scale):     PAIN AD Score: 4    http://[email or web address]/cog/painad.pdf (press ctrl +  left click to view)    Dyspnea:                           [ ]Mild [ ]Moderate [ ]Severe  Anxiety:                             [ ]Mild [ ]Moderate [ ]Severe  Fatigue:                             [ ]Mild [ ]Moderate [ ]Severe  Nausea:                             [ ]Mild [ ]Moderate [ ]Severe  Loss of appetite:              [ ]Mild [ ]Moderate [ ]Severe  Constipation:                    [ ]Mild [ ]Moderate [ ]Severe    Other Symptoms:  [ ]All other review of systems negative     Karnofsky Performance Score/Palliative Performance Status Version 2:        10%    http://npcrc.org/files/news/palliative_performance_scale_ppsv2.pdf  PHYSICAL EXAM:  Vital Signs Last 24 Hrs  T(C): 37.4 (2020 08:45), Max: 38.2 (2020 20:00)  T(F): 99.4 (2020 08:45), Max: 100.8 (2020 20:00)  HR: 61 (2020 14:15) (53 - 148)  BP: 119/53 (2020 14:15) (119/53 - 199/82)  BP(mean): 72 (2020 14:15) (72 - 140)  RR: 20 (2020 14:15) (18 - 27)  SpO2: 97% (2020 14:15) (96% - 100%) I&O's Summary    2020 07:01  -  2020 07:00  --------------------------------------------------------  IN: 3833.1 mL / OUT: 1900 mL / NET: 1933.1 mL    2020 07:01  -  2020 14:50  --------------------------------------------------------  IN: 946.8 mL / OUT: 350 mL / NET: 596.8 mL    GENERAL:  [ ]Alert  [ ]Oriented x   [ x]Lethargic  [ ]Cachexia  [ ]Unarousable  [ ]Verbal  [x ]Non-Verbal   Behavioral:   [ ] Anxiety  [ ] Delirium [x ] Agitation [ ] Other  HEENT:  [ ]Normal   [ ]Dry mouth   [x ]ET Tube/Trach  [ ]Oral lesions  PULMONARY:   [ ]Clear [ ]Tachypnea  [ ]Audible excessive secretions   [x ]Rhonchi        [ ]Right [ ]Left [ ]Bilateral  [ ]Crackles        [ ]Right [ ]Left [ ]Bilateral  [ ]Wheezing     [ ]Right [ ]Left [ ]Bilateral  CARDIOVASCULAR:    [x ]Regular [ ]Irregular [ ]Tachy  [ ]Fan [ ]Murmur [ ]Other  GASTROINTESTINAL:  [x ]Soft  [ ]Distended   [ x]+BS  [ ]Non tender [ ]Tender  [ ]PEG [ ]OGT/ NGT  Last BM: 20  GENITOURINARY:  [ ]Normal [ ] Incontinent   [ ]Oliguria/Anuria   [x ]Michael  MUSCULOSKELETAL:   [ ]Normal   [ ]Weakness  [x ]Bed/Wheelchair bound [ x]Edema-right arm  NEUROLOGIC:   [ ]No focal deficits  [x ] Cognitive impairment  [x ] Dysphagia [ ]Dysarthria [x ] Paresis [ ]Other   SKIN: ecchymoses  [ ]Normal   [ ]Pressure ulcer(s)  [ ]Rash    CRITICAL CARE:  [ ] Shock Present  [ ]Septic [ ]Cardiogenic [ ]Neurologic [ ]Hypovolemic  [ ]  Vasopressors [ ]  Inotropes   [x ] Respiratory failure present [x ] mechanical ventilation [ ] non-invasive ventilatory support [ ] High flow  [x ] Acute  [ ] Chronic [ ] Hypoxic  [ ] Hypercarbic [ ] Other  [ ] Other organ failure     LABS:                        11.2   13.65 )-----------( 167      ( 2020 04:10 )             36.6   02-14    149<H>  |  112<H>  |  23  ----------------------------<  285<H>  3.5   |  23  |  0.59    Ca    8.8      2020 12:50  Phos  2.5     -  Mg     2.2     -14        Urinalysis Basic - ( 2020 03:20 )    Color: Yellow / Appearance: Slightly Turbid / S.023 / pH: x  Gluc: x / Ketone: Trace  / Bili: Negative / Urobili: Negative   Blood: x / Protein: Trace / Nitrite: Negative   Leuk Esterase: Large / RBC: 8 /hpf / WBC 56 /HPF   Sq Epi: x / Non Sq Epi: 0 /hpf / Bacteria: Many      RADIOLOGY & ADDITIONAL STUDIES:  < from: CT Head No Cont (20 @ 09:22) >    EXAM:  CT BRAIN                            PROCEDURE DATE:  2020            INTERPRETATION:  INDICATIONS:  s/p CVA    TECHNIQUE:  Serial axial images were obtained from the skull base to the vertex without intravenous contrast.    COMPARISON EXAMINATION: 2020.    FINDINGS:    VENTRICLES AND SULCI: Evidence of the left MCA infarct now with increased mass effect on the left lateral ventricle with near complete effacement of the left lateral ventricle and midline shift. Subfalcine herniation is apparent. Early signs of uncal herniation seen. Midline is shifted roughly 8 mm to the right.  INTRA-AXIAL: Left MCA territory infarct with now small focus of hemorrhage suggested within the infarct (1:15)  EXTRA-AXIAL:  No mass or collectionis seen.  VISUALIZED SINUSES:  Clear.  VISUALIZED MASTOIDS:  Clear.  CALVARIUM:  Normal.  MISCELLANEOUS: Patient is intubated with an NG tube  IMPRESSION: Evolving left MCA infarct now with increased mass effect on the left lateral ventricle and increase midline shift of roughly 8 mm compared with the prior where the midline was maintained. Evidence of subfalcine herniation with signs of evolving early uncal herniation. Some new trace hemorrhage seen within the region of infarct    Dr. Acunadiscussed these findings with GONZALO Layton  in the NSCU on 2020 10:06 AM with read back.      JONATHAN ACUNA M.D., ATTENDING RADIOLOGIST  This document has been electronically signed. 2020 10:09AM      < end of copied text >    PROTEIN CALORIE MALNUTRITION PRESENT: [x ] Yes [ ] No  [x ] PPSV2 < or = to 30% [ ] significant weight loss  [x ] poor nutritional intake [ ] catabolic state [ ] anasarca     Albumin, Serum: 4.1 g/dL (02-10-20 @ 17:46)  Artificial Nutrition [x ]     REFERRALS:   [ ]Chaplaincy  [ ] Hospice  [ ]Child Life  [x ]Social Work  [ ]Case management [ ]Holistic Therapy     Goals of Care Document:   Care Coordination Assessment [C. Provider] (20 @ 11:45)   Progress Notes - Care Coordination [C. Provider] (20 @ 14:18)

## 2020-02-14 NOTE — PROGRESS NOTE ADULT - PROBLEM SELECTOR PLAN 2
2/2 to CVA. Remains intubated and vented. Awaiting to speak with daughter who is the surrogate as per the NSCU team. Unclear at this time for long term goals, however, as per NSCU attending, Dr. Ghosh, the daughter related to her that her mother had expressed wishes in the past of not wanting a life dependent on machines.

## 2020-02-14 NOTE — PROGRESS NOTE ADULT - ASSESSMENT
INCOMPLETE  ASSESSMENT/PLAN: Left MCA stroke    NEURO:  L. MCA stroke, not a tPA or thrombectomy candidate.    Stroke work-up/core measures  Secondary Stroke prevention: ASA and statin   Delirium precautions  EEG 02/13 no seizures overnight, rare T7/P7 spike and wave discharges, continuous L. hemispheric theta/polymorphic delta and L. sided attention of fast activity.   Prn fentanyl for sedation  Activity: [] mobilize as tolerated [x] Bedrest [] PT [] OT [] PMNR    PULM:  Vent support  VAP bundle  CXR    CV:  SBP goal 120-180mmHg;   metoprolol tartrate 50 mg PO BID + amlodipine 5 daily +valsartan 160 PO BID.   Amiodarone gtt added  due to AF w RVR and insufficient control on metoprolol.    Lasix given due to pulm edema.    RENAL:  Trend Cr, PT with UTI    GI:  Diet: On TF   GI prophylaxis [] not indicated [x] PPI: vented [] other:  Bowel regimen [] colace [] senna [] other:    ENDO:   Goal euglycemia (-180), persistently hyperglycemic >200  HBA1c: 8.1 , LDL 70  insulin Lispro sliding scale SQ q6  Insulin NPH 6U q6    HEME/ONC:  VTE prophylaxis: [s] SCDs [x] chemoprophylaxis -  [] hold chemoprophylaxis due to: [] high risk of DVT/PE on admission due to:    ID:  TMAX 100.8 (13 Feb 2020 20:00)  UTI on CTX     MISC:  Right wrist - x-ray 02/13/20, pending read-no fracture evident to my eye    SOCIAL/FAMILY:  [] awaiting [x] updated at bedside [] family meeting    CODE STATUS:  [x] Full Code [] DNR [] DNI [] Palliative/Comfort Care    DISPOSITION:  [x] ICU [] Stroke Unit [] Floor [] EMU [] RCU [] PCU    [x] Patient is at high risk of neurologic deterioration/death due to: seizure, intracerebral hemorrhage     Time spent: 45 [x] critical care minutes INCOMPLETE  ASSESSMENT/PLAN: Left MCA stroke  New onset Afib     NEURO:  L. MCA stroke, not a tPA or thrombectomy candidate.    Stroke work-up/core measures  Secondary Stroke prevention: ASA and statin   Delirium precautions  EEG no seizure   Prn fentanyl for sedation  Activity: [] mobilize as tolerated [x] Bedrest [] PT [] OT [] PMNR    PULM:  Vent support  VAP bundle  continue on full support   CXR     CV:  SBP goal 120-180mmHg;   metoprolol tartrate 50 mg PO BID + amlodipine 5 daily +valsartan 160 PO BID.   Amiodarone gtt added  due to AF w RVR and insufficient control on metoprolol.    Lasix 10 given last night     RENAL:  Na goal 145-155  f/u BMP after Lasix   Trend Cr,   PT with UTI    GI:  Diet: On TF   GI prophylaxis [] not indicated [x] PPI: vented [] other:  Bowel regimen: LBM 2/14 [] colace [] senna [] other:    ENDO:   Goal euglycemia (-180), persistently hyperglycemic >200  HBA1c: 8.1 , LDL 70  start Insulin drip     HEME/ONC:  VTE prophylaxis: [s] SCDs [x] chemoprophylaxis - lovenox  [] hold chemoprophylaxis due to: [] high risk of DVT/PE on admission due to:    ID:  TMAX 100.8 (13 Feb 2020 20:00)  UTI r/o PNA   change ABx to Zosyn   Send procalcitonin     MISC:  Right wrist - x-ray 02/13/20, pending read-no fracture evident to my eye    SOCIAL/FAMILY:  [] awaiting [x] updated at bedside [] family meeting    CODE STATUS:  [x] Full Code [] DNR [] DNI [] Palliative/Comfort Care    DISPOSITION:  [x] ICU [] Stroke Unit [] Floor [] EMU [] RCU [] PCU    [x] Patient is at high risk of neurologic deterioration/death due to: seizure, intracerebral hemorrhage     Time spent: 45 [x] critical care minutes INCOMPLETE  ASSESSMENT/PLAN: Left MCA stroke  New onset Afib     NEURO:  L. MCA stroke, not a tPA or thrombectomy candidate.    Stroke work-up/core measures  Secondary Stroke prevention: ASA and statin   Delirium precautions  Worsening brain edema and midline shift in CTH, family refused surgical intervention   Na goal 145-155  EEG no seizure   Prn fentanyl for sedation  Activity: [] mobilize as tolerated [x] Bedrest [] PT [] OT [] PMNR    PULM:  Vent support  VAP bundle  continue on full support   CXR     CV:  SBP goal 120-180mmHg;   metoprolol tartrate 50 mg PO BID + amlodipine 5 daily +valsartan 160 PO BID.   Amiodarone gtt added  due to AF w RVR and insufficient control on metoprolol.    Lasix 10 given last night     RENAL:  Na goal 145-155  f/u BMP after Lasix   Trend Cr,   PT with UTI    GI:  Diet: On TF   GI prophylaxis [] not indicated [x] PPI: vented [] other:  Bowel regimen: LBM 2/14 [] colace [] senna [] other:    ENDO:   Goal euglycemia (-180), persistently hyperglycemic >200  HBA1c: 8.1 , LDL 70  start Insulin drip     HEME/ONC:  VTE prophylaxis: [s] SCDs [x] chemoprophylaxis - lovenox  [] hold chemoprophylaxis due to: [] high risk of DVT/PE on admission due to:    ID:  TMAX 100.8 (13 Feb 2020 20:00)  UTI r/o PNA   change ABx to Zosyn   Send procalcitonin     MISC:  Right wrist - x-ray 02/13/20, pending read-no fracture evident to my eye    SOCIAL/FAMILY:  [] awaiting [x] updated at bedside [] family meeting    CODE STATUS:  [x] Full Code [] DNR [] DNI [] Palliative/Comfort Care    DISPOSITION:  [x] ICU [] Stroke Unit [] Floor [] EMU [] RCU [] PCU    [x] Patient is at high risk of neurologic deterioration/death due to: seizure, intracerebral hemorrhage     Time spent: 45 [x] critical care minutes INCOMPLETE  ASSESSMENT/PLAN: Left MCA stroke likely from atrial fib  New onset Afib   worsening brain edema and midline shift    NEURO:  L. MCA stroke, not a tPA or thrombectomy candidate.    Stroke work-up/core measures  Secondary Stroke prevention: ASA and statin   Delirium precautions  Worsening brain edema and midline shift in CTH, family refused surgical intervention   Na goal 145-155  EEG no seizure   Prn fentanyl for sedation  Activity: [] mobilize as tolerated [x] Bedrest [] PT [] OT [] PMNR    PULM:  Vent support  VAP bundle  continue on full support   CXR     CV:  SBP goal 120-180mmHg;   metoprolol tartrate 50 mg PO BID + amlodipine 5 daily +valsartan 160 PO BID.   Amiodarone gtt added  due to AF w RVR and insufficient control on metoprolol.    Lasix 10 given last night for pulm edema     RENAL:  Na goal 145-155  f/u BMP after Lasix   Trend Cr,   PT with UTI    GI:  Diet: On TF   GI prophylaxis [] not indicated [x] PPI: vented [] other:  Bowel regimen: LBM 2/14 [] colace [] senna [] other:    ENDO:   Goal euglycemia (-180), persistently hyperglycemic >200  HBA1c: 8.1 , LDL 70  start Insulin drip     HEME/ONC:  VTE prophylaxis: [s] SCDs [x] chemoprophylaxis - lovenox  [] hold chemoprophylaxis due to: [] high risk of DVT/PE on admission due to:    ID:  TMAX 100.8 (13 Feb 2020 20:00)  UTI r/o PNA   change ABx to Zosyn   Send procalcitonin     MISC:  Right wrist - x-ray 02/13/20, pending read-no fracture evident to my eye    SOCIAL/FAMILY:  [] awaiting [x] updated at bedside [] family meeting    CODE STATUS:  [x] Full Code [] DNR [] DNI [] Palliative/Comfort Care    DISPOSITION:  [x] ICU [] Stroke Unit [] Floor [] EMU [] RCU [] PCU    [x] Patient is at high risk of neurologic deterioration/death due to: seizure, intracerebral hemorrhage     Time spent: 45 [x] critical care minutes ASSESSMENT/PLAN: Left MCA stroke likely from atrial fib  New onset Afib   worsening brain edema and midline shift    NEURO:  L. MCA stroke, not a tPA or thrombectomy candidate.    Stroke work-up/core measures  Secondary Stroke prevention: ASA and statin   Delirium precautions  Worsening brain edema and midline shift in CTH, family refused surgical intervention   Na goal 145-155  EEG no seizure   Prn fentanyl for sedation  Activity: [] mobilize as tolerated [x] Bedrest [] PT [] OT [] PMNR    PULM:  Vent support  VAP bundle  continue on full support   CXR     CV:  SBP goal 120-180mmHg;   metoprolol tartrate 50 mg PO BID + amlodipine 5 daily +valsartan 160 PO BID.   Amiodarone gtt added  due to AF w RVR and insufficient control on metoprolol.    Lasix 10 given last night for pulm edema     RENAL:  Na goal 145-155  f/u BMP after Lasix   Trend Cr,   PT with UTI    GI:  Diet: On TF   GI prophylaxis [] not indicated [x] PPI: vented [] other:  Bowel regimen: LBM 2/14 [] colace [] senna [] other:    ENDO:   Goal euglycemia (-180), persistently hyperglycemic >200  HBA1c: 8.1 , LDL 70  start Insulin drip     HEME/ONC:  VTE prophylaxis: [s] SCDs [x] chemoprophylaxis - lovenox  [] hold chemoprophylaxis due to: [] high risk of DVT/PE on admission due to:    ID:  TMAX 100.8 (13 Feb 2020 20:00)  UTI r/o PNA   change ABx to Zosyn   Send procalcitonin     MISC:  Right wrist - x-ray 02/13/20, pending read-no fracture evident to my eye    SOCIAL/FAMILY:  [] awaiting [x] updated at bedside [] family meeting    CODE STATUS:  [x] Full Code [] DNR [] DNI [] Palliative/Comfort Care    DISPOSITION:  [x] ICU [] Stroke Unit [] Floor [] EMU [] RCU [] PCU    [x] Patient is at high risk of neurologic deterioration/death due to: seizure, intracerebral hemorrhage     Time spent: 45 [x] critical care minutes

## 2020-02-14 NOTE — CONSULT NOTE ADULT - ASSESSMENT
Rani Heller  79F w/ h/o HTN, DM II and HLD initially presented to [facility] unresponsive on 2/10/20 w/ large L MCA stroke. globally aphasic, R side weakness. HCT shows large L MCA stroke. Has been in the NSCU under intensivist. Exam: intubated, Eo to voice, not FC, RUE ext, RLE Tf. L side spon. HCT today shows stroke progression, with more mass effect  -Spoke with family regarding risks and benefits of surgery, and they would not like any surgical intervention. Would like to continue medical management of stroke and swelling  -Na 145-155 ( last one 152)  -Continued management per NSCU

## 2020-02-14 NOTE — CONSULT NOTE ADULT - SUBJECTIVE AND OBJECTIVE BOX
CHIEF COMPLAINT:Patient is a 79y old  Female who presents with a chief complaint of Left MCA infarct (14 Feb 2020 08:23)      HISTORY OF PRESENT ILLNESS:    pt on vent   history taken from the chart     78 y/o woman w/ h/o HTN, DM II and HLD initially presented to [facility] on 2/10/20 after being found unresponsive by family at 4 pm. LKN 9:30pm on 2/9/20. At baseline, she is independent and lives alone. At [facility] she was noted to be globally aphasic with RUE hemiplegia. CT head revealed dense L. MCA sign & acute L. MCA infarction. CTA revealed a L.M1 occlusion. She reportedly presented as a GCS 8 but declined to GCS 4, and was intubated for airway protection on 02/10.  Transferred to [facility] ED, remained intubated, admitted to NSCU service, originally boarded in MICU, [facility].  Was not a thrombectomy or tPA candidate.    cardiology is called for evaluation for afib     PAST MEDICAL & SURGICAL HISTORY:  Basal cell carcinoma: nose  Gallbladder polyp  Chronic bronchitis, simple  Dyslipidemia  Diabetes: fbs  104-124  Hypertension  S/P colonoscopy: 2013  S/P cataract extraction, unspecified laterality: bilateral  S/P hysterectomy with oophorectomy: due to myoma 1994  S/P breast biopsy, right: 1985          MEDICATIONS:  aMIOdarone Infusion 1 mG/Min IV Continuous <Continuous>  aMIOdarone Infusion 0.5 mG/Min IV Continuous <Continuous>  amLODIPine   Tablet 5 milliGRAM(s) Oral daily  aspirin  chewable 81 milliGRAM(s) Oral daily  enoxaparin Injectable 40 milliGRAM(s) SubCutaneous <User Schedule>  furosemide   Injectable 10 milliGRAM(s) IV Push once  hydrALAZINE Injectable 10 milliGRAM(s) IV Push every 4 hours PRN  metoprolol tartrate 50 milliGRAM(s) Oral two times a day  valsartan 160 milliGRAM(s) Oral two times a day    cefTRIAXone   IVPB 1000 milliGRAM(s) IV Intermittent every 24 hours      acetaminophen   Tablet .. 650 milliGRAM(s) Oral every 6 hours PRN  fentaNYL    Injectable 25 MICROGram(s) IV Push every 4 hours PRN    pantoprazole  Injectable 40 milliGRAM(s) IV Push daily  polyethylene glycol 3350 17 Gram(s) Oral two times a day  senna 2 Tablet(s) Oral at bedtime    atorvastatin 80 milliGRAM(s) Oral at bedtime  insulin lispro (HumaLOG) corrective regimen sliding scale   SubCutaneous every 6 hours  insulin NPH human recombinant 6 Unit(s) SubCutaneous every 6 hours    chlorhexidine 0.12% Liquid 15 milliLiter(s) Oral Mucosa every 12 hours  chlorhexidine 4% Liquid 1 Application(s) Topical <User Schedule>      FAMILY HISTORY:  Family history of colon cancer (Sibling)  Family history of CVA  Family history of borderline diabetes mellitus      Non-contributory    SOCIAL HISTORY:    No tobacco, drugs or etoh    Allergies    No Known Allergies    Intolerances    	    REVIEW OF SYSTEMS:  as above  The rest of the 14 points ROS reviewed and except above they are unremarkable.        PHYSICAL EXAM:  T(C): 37.4 (02-14-20 @ 08:45), Max: 38.2 (02-13-20 @ 20:00)  HR: 126 (02-14-20 @ 10:00) (53 - 136)  BP: 137/93 (02-14-20 @ 10:00) (137/93 - 221/84)  RR: 20 (02-14-20 @ 10:00) (18 - 29)  SpO2: 97% (02-14-20 @ 10:00) (96% - 99%)  Wt(kg): --  I&O's Summary    13 Feb 2020 07:01  -  14 Feb 2020 07:00  --------------------------------------------------------  IN: 3833.1 mL / OUT: 1900 mL / NET: 1933.1 mL    14 Feb 2020 07:01  -  14 Feb 2020 11:02  --------------------------------------------------------  IN: 519.9 mL / OUT: 350 mL / NET: 169.9 mL        Appearance: on vent 	  Cardiovascular: Normal S1 S2,    Murmur:   Neck: JVP limited to be evaluated   Respiratory: Lungs few rhonchi   Gastrointestinal:  Soft  Skin: normal   Neuro: limited as pt on vent      LABS/DATA:    TELEMETRY: 	    ECG:  	   	  CARDIAC MARKERS:                        11 <<== 02-14-20 @ 06:23                              11.2   13.65 )-----------( 167      ( 14 Feb 2020 04:10 )             36.6     02-14    152<H>  |  119<H>  |  26<H>  ----------------------------<  266<H>  3.4<L>   |  22  |  0.54    Ca    8.6      14 Feb 2020 04:10  Phos  2.6     02-14  Mg     2.4     02-14      proBNP:   Lipid Profile:   HgA1c:   TSH:       < from: TTE with Doppler (w/Cont) (02.12.20 @ 05:49) >  ------------------------------------------------------------------------  Conclusions:  1. Mitral annular calcification, otherwise normal mitral  valve. Mild mitral regurgitation.  2. Calcified trileaflet aortic valve with normal opening.  Minimal aortic regurgitation.  3. Normal left ventricular systolic function. No segmental  wall motion abnormalities. Endocardial visualization  enhanced with intravenous injection of Ultrasonic Enhancing  Agent (Definity). No left ventricular thrombus.  4. Normal diastolic function  5. Right ventricular enlargement with normal right  ventricular systolic function.  6. Normal pericardium with no pericardial effusion.  *** No previous Echo exam.  ------------------------------------------------------------------------  Confirmed on  2/12/2020 - 11:06:21 by Hollis Coleman M.D.  ------------------------------------------------------------------------    < end of copied text >

## 2020-02-14 NOTE — PROGRESS NOTE ADULT - SUBJECTIVE AND OBJECTIVE BOX
SUMMARY:   80 y/o woman w/ h/o HTN, DM II and HLD initially presented to [facility] on 2/10/20 after being found unresponsive by family at 4 pm. LKN 9:30pm on 2/9/20. At baseline, she is independent and lives alone. At [facility] she was noted to be globally aphasic with RUE hemiplegia. CT head revealed dense L. MCA sign & acute L. MCA infarction. CTA revealed a L.M1 occlusion. She reportedly presented as a GCS 8 but declined to GCS 4, and was intubated for airway protection on 02/10.  Transferred to [facility] ED, remained intubated, admitted to NSCU service, originally boarded in MICU, being transferred to NSCU 02/14.  Was not a thrombectomy or tPA candidate.    2/11: was started on TF, ASA and DVT PPX  2/12: was started on CPAP trials , EEG no seizure   2/13: AF with RVR, started on amio gtt, being transferred NSCU.     MEDICATIONS  (STANDING):  aMIOdarone Infusion 1 mG/Min (33.333 mL/Hr) IV Continuous <Continuous>  aMIOdarone Infusion 0.5 mG/Min (16.667 mL/Hr) IV Continuous <Continuous>  amLODIPine   Tablet 5 milliGRAM(s) Oral daily  aspirin  chewable 81 milliGRAM(s) Oral daily  atorvastatin 80 milliGRAM(s) Oral at bedtime  cefTRIAXone   IVPB 1000 milliGRAM(s) IV Intermittent every 24 hours  chlorhexidine 0.12% Liquid 15 milliLiter(s) Oral Mucosa every 12 hours  chlorhexidine 4% Liquid 1 Application(s) Topical <User Schedule>  clocortolone 0.1% 1 Application(s) 1 Application(s) Topical two times a day  enoxaparin Injectable 40 milliGRAM(s) SubCutaneous <User Schedule>  furosemide   Injectable 10 milliGRAM(s) IV Push once  insulin lispro (HumaLOG) corrective regimen sliding scale   SubCutaneous every 6 hours  insulin NPH human recombinant 6 Unit(s) SubCutaneous every 6 hours  metoprolol tartrate 50 milliGRAM(s) Oral two times a day  pantoprazole  Injectable 40 milliGRAM(s) IV Push daily  polyethylene glycol 3350 17 Gram(s) Oral two times a day  senna 2 Tablet(s) Oral at bedtime  valsartan 160 milliGRAM(s) Oral two times a day    MEDICATIONS  (PRN):  acetaminophen   Tablet .. 650 milliGRAM(s) Oral every 6 hours PRN Temp greater or equal to 38C (100.4F)  fentaNYL    Injectable 25 MICROGram(s) IV Push every 4 hours PRN restlessness  hydrALAZINE Injectable 10 milliGRAM(s) IV Push every 4 hours PRN SBP > 180    ICU Vital Signs Last 24 Hrs  T(C): 37.6 (14 Feb 2020 07:00), Max: 38.2 (13 Feb 2020 20:00)  T(F): 99.7 (14 Feb 2020 07:00), Max: 100.8 (13 Feb 2020 20:00)  HR: 117 (14 Feb 2020 07:00) (53 - 133)  BP: 141/81 (14 Feb 2020 07:00) (141/81 - 221/84)  BP(mean): 102 (14 Feb 2020 07:00) (102 - 122)  ABP: --  ABP(mean): --  RR: 26 (14 Feb 2020 07:00) (18 - 29)  SpO2: 96% (14 Feb 2020 07:00) (96% - 99%)      02-13-20 @ 07:01  -  02-14-20 @ 07:00  --------------------------------------------------------  IN: 3833.1 mL / OUT: 1900 mL / NET: 1933.1 mL    Mode: CPAP with PS  FiO2: 40  PEEP: 5  PS: 10  MAP: 9  PIP: 17    EXAMINATION:  General: No acute distress  HEENT: Anicteric sclerae  Cardiac: [id number]  Lungs: Clear  Abdomen: Soft, non-tender, +BS  Extremities: No c/c/e  Skin/Incision Site: Clean, dry and intact  Neurologic: Eyes open to noxious stimuli, no gaze deviation, no command following, PERR 3mm, +cough/gag, Left side withdrawal, right side hemiplegic with triple flexion.    Right wrist with mild edema and small bruise    LABS/STUDIES             11.2   13.65 )-----------( 167      ( 14 Feb 2020 04:10 )             36.6   02-14    152<H>  |  119<H>  |  26<H>  ----------------------------<  266<H>  3.4<L>   |  22  |  0.54    Ca    8.6      14 Feb 2020 04:10  Phos  2.6     02-14  Mg     2.4     02-14    ABG - ( 14 Feb 2020 06:16 )  pH, Arterial: 7.50  pH, Blood: x     /  pCO2: 31    /  pO2: 104   / HCO3: 24    / Base Excess: 1.9   /  SaO2: 99        CAPILLARY BLOOD GLUCOSE  POCT Blood Glucose.: 209 mg/dL (14 Feb 2020 07:52)  POCT Blood Glucose.: 226 mg/dL (14 Feb 2020 00:11)  POCT Blood Glucose.: 237 mg/dL (13 Feb 2020 17:24)  POCT Blood Glucose.: 276 mg/dL (13 Feb 2020 11:36)    CT Brain Stroke Protocol (02.10.20 @ 16:58) There is a hyperdense appearing left middle cerebral artery. There is corresponding hypoattenuation within the left MCA territory within the left frontal lobe extending to the left insula. Hypoattenuation is also noted within the left anterior temporal lobe. Hypoattenuation extends into the superior left basal gangliaand corona radiata. There is no hemorrhagic transformation.    There is no hydrocephalus, shift of the midline structures, herniation, or abnormal intra-axial fluid collection.    Polyps versus retention cysts are noted within the right maxillary andleft sphenoid sinuses. Otherwise, the paranasal sinuses and mastoid air cells are clear. The calvarium appears intact. There is evidence of bilateral cataract removal.    IMPRESSION: Acute left MCA distribution infarction of L. frontal lobe extending to L.insula with an associated hyperdense left MCA sign. No hemorrhagic transformation.    CT Head No Cont (02.11.20 @ 10:38)  IMPRESSION:  Interval demarcation of a large left MCA territory infarct. No CT evidence for renea hemorrhagic transformation.  New mass effect upon the left lateral ventricle. New minimal rightward midline shift. No effacement of the basal cisterns. No hydrocephalus.    CT Angio Neck w/ IV Cont (02.10.20 @ 19:55)  There is persistent, somewhat more extensive cytotoxic edema surrounding the left insular cortex with persistent hyperdensity of the left M1 segment. There is no hemorrhage. There is mild mass effect with slight lateral ventricular effacement. Edema involves most of the lateral left basal ganglia. CTA confirms focal cut off of the left M1 segment approximately 8 mm distal to the origin. There is faint enhancement distal to this point implying either incomplete obstruction or collateralization of distal branches. There is normal appearance of the right MCA and both posterior cerebral arteries. There is a patent anterior communicating artery. The left posterior cerebral arteries primarily fed from the anterior circulation. There is a slightly diminutive left A1 segment.  Examination of the cervical circulation is negative for carotid or vertebral artery stenosis, occlusion or dissection.    IMPRESSION: Expanding zone of edema related to maturing infarct in the left MCA distribution as above    02/13/20 EEG Classification / Summary:  Abnormal EEG in the sedated state due to:  1) Rare left posterior quadrant ( max T7/P7) spike and wave discharges.   2) Continuous left hemispheric theta/ polymorphic delta present.  Left sided attenuation of fast activity  3) Moderate diffuse slowing.     Clinical Impression:  1) Potential epileptogenic focus in the left posterior quadrant region.  2) Structural abnormality in the left hemisphere involving grey and white matter.   3) Moderate diffuse or multifocal cerebral dysfunction.    No electrographic seizures seen.    TTE with Doppler (w/Cont) (02.12.20 @ 05:49) >  Conclusions:  1. Mitral annular calcification, otherwise normal mitral valve. Mild mitral regurgitation.  2. Calcified trileaflet aortic valve with normal opening. Minimal aortic regurgitation.  3. Normal left ventricular systolic function. No segmentalwall motion abnormalities. Endocardial visualization  enhanced with intravenous injection of Ultrasonic Enhancing Agent (Definity). No left ventricular thrombus.  4. Normal diastolic function  5. Right ventricular enlargement with normal right ventricular systolic function.  6. Normal pericardium with no pericardial effusion.  *** No previous Echo exam. SUMMARY:   78 y/o woman w/ h/o HTN, DM II and HLD initially presented to [facility] on 2/10/20 after being found unresponsive by family at 4 pm. LKN 9:30pm on 2/9/20. At baseline, she is independent and lives alone. At [facility] she was noted to be globally aphasic with RUE hemiplegia. CT head revealed dense L. MCA sign & acute L. MCA infarction. CTA revealed a L.M1 occlusion. She reportedly presented as a GCS 8 but declined to GCS 4, and was intubated for airway protection on 02/10.  Transferred to [facility] ED, remained intubated, admitted to NSCU service, originally boarded in MICU, being transferred to NSCU 02/14.  Was not a thrombectomy or tPA candidate.    2/11: was started on TF, ASA and DVT PPX  2/12: was started on CPAP trials , EEG no seizure   2/13: AF with RVR,  s/p IV BB and CCB with no response , started on amio gtt,               ICU Vital Signs Last 24 Hrs  T(C): 37.4 (14 Feb 2020 08:45), Max: 38.2 (13 Feb 2020 20:00)  T(F): 99.4 (14 Feb 2020 08:45), Max: 100.8 (13 Feb 2020 20:00)  HR: 133 (14 Feb 2020 11:05) (53 - 148)  BP: 141/87 (14 Feb 2020 11:05) (137/93 - 221/84)  BP(mean): 102 (14 Feb 2020 11:05) (102 - 124)  ABP: --  ABP(mean): --  RR: 24 (14 Feb 2020 11:05) (18 - 29)  SpO2: 97% (14 Feb 2020 11:05) (96% - 100%)      02-13-20 @ 07:01  -  02-14-20 @ 07:00  --------------------------------------------------------  IN: 3833.1 mL / OUT: 1900 mL / NET: 1933.1 mL    02-14-20 @ 07:01  -  02-14-20 @ 11:20  --------------------------------------------------------  IN: 519.9 mL / OUT: 350 mL / NET: 169.9 mL      Mode: AC/ CMV (Assist Control/ Continuous Mandatory Ventilation), RR (machine): 12, TV (machine): 500, FiO2: 40, PEEP: 5, ITime: 1, MAP: 9, PIP: 15    acetaminophen   Tablet .. 650 milliGRAM(s) Oral every 6 hours PRN  aMIOdarone Infusion 1 mG/Min (33.333 mL/Hr) IV Continuous <Continuous>  aMIOdarone Infusion 0.5 mG/Min (16.667 mL/Hr) IV Continuous <Continuous>  amLODIPine   Tablet 5 milliGRAM(s) Oral daily  aspirin  chewable 81 milliGRAM(s) Oral daily  atorvastatin 80 milliGRAM(s) Oral at bedtime  cefTRIAXone   IVPB 1000 milliGRAM(s) IV Intermittent every 24 hours  chlorhexidine 0.12% Liquid 15 milliLiter(s) Oral Mucosa every 12 hours  chlorhexidine 4% Liquid 1 Application(s) Topical <User Schedule>  clocortolone 0.1% 1 Application(s) 1 Application(s) Topical two times a day  enoxaparin Injectable 40 milliGRAM(s) SubCutaneous <User Schedule>  fentaNYL    Injectable 25 MICROGram(s) IV Push every 4 hours PRN  furosemide   Injectable 10 milliGRAM(s) IV Push once  insulin lispro (HumaLOG) corrective regimen sliding scale   SubCutaneous every 6 hours  insulin NPH human recombinant 6 Unit(s) SubCutaneous every 6 hours  metoprolol tartrate 50 milliGRAM(s) Oral two times a day  pantoprazole  Injectable 40 milliGRAM(s) IV Push daily  polyethylene glycol 3350 17 Gram(s) Oral two times a day  senna 2 Tablet(s) Oral at bedtime  valsartan 160 milliGRAM(s) Oral two times a day                            11.2   13.65 )-----------( 167      ( 14 Feb 2020 04:10 )             36.6     02-14    152<H>  |  119<H>  |  26<H>  ----------------------------<  266<H>  3.4<L>   |  22  |  0.54    Ca    8.6      14 Feb 2020 04:10  Phos  2.6     02-14  Mg     2.4     02-14        ABG - ( 14 Feb 2020 06:16 )  pH, Arterial: 7.50  pH, Blood: x     /  pCO2: 31    /  pO2: 104   / HCO3: 24    / Base Excess: 1.9   /  SaO2: 99                      EXAMINATION:  General: No acute distress  HEENT: Anicteric sclerae  Cardiac: [id number]  Lungs: Clear  Abdomen: Soft, non-tender, +BS  Extremities: No c/c/e  Skin/Incision Site: Clean, dry and intact  Neurologic: Eyes open to noxious stimuli, no gaze deviation, no command following, PERR 3mm, +cough/gag, Left side spontaneously AG,  right side hemiplegic with triple flexion.    Right wrist with mild edema and small bruise      CAPILLARY BLOOD GLUCOSE  POCT Blood Glucose.: 209 mg/dL (14 Feb 2020 07:52)  POCT Blood Glucose.: 226 mg/dL (14 Feb 2020 00:11)  POCT Blood Glucose.: 237 mg/dL (13 Feb 2020 17:24)  POCT Blood Glucose.: 276 mg/dL (13 Feb 2020 11:36)    CT Brain Stroke Protocol (02.10.20 @ 16:58) There is a hyperdense appearing left middle cerebral artery. There is corresponding hypoattenuation within the left MCA territory within the left frontal lobe extending to the left insula. Hypoattenuation is also noted within the left anterior temporal lobe. Hypoattenuation extends into the superior left basal gangliaand corona radiata. There is no hemorrhagic transformation.    There is no hydrocephalus, shift of the midline structures, herniation, or abnormal intra-axial fluid collection.    Polyps versus retention cysts are noted within the right maxillary andleft sphenoid sinuses. Otherwise, the paranasal sinuses and mastoid air cells are clear. The calvarium appears intact. There is evidence of bilateral cataract removal.    IMPRESSION: Acute left MCA distribution infarction of L. frontal lobe extending to L.insula with an associated hyperdense left MCA sign. No hemorrhagic transformation.    CT Head No Cont (02.11.20 @ 10:38)  IMPRESSION:  Interval demarcation of a large left MCA territory infarct. No CT evidence for renea hemorrhagic transformation.  New mass effect upon the left lateral ventricle. New minimal rightward midline shift. No effacement of the basal cisterns. No hydrocephalus.    CT Angio Neck w/ IV Cont (02.10.20 @ 19:55)  There is persistent, somewhat more extensive cytotoxic edema surrounding the left insular cortex with persistent hyperdensity of the left M1 segment. There is no hemorrhage. There is mild mass effect with slight lateral ventricular effacement. Edema involves most of the lateral left basal ganglia. CTA confirms focal cut off of the left M1 segment approximately 8 mm distal to the origin. There is faint enhancement distal to this point implying either incomplete obstruction or collateralization of distal branches. There is normal appearance of the right MCA and both posterior cerebral arteries. There is a patent anterior communicating artery. The left posterior cerebral arteries primarily fed from the anterior circulation. There is a slightly diminutive left A1 segment.  Examination of the cervical circulation is negative for carotid or vertebral artery stenosis, occlusion or dissection.    IMPRESSION: Expanding zone of edema related to maturing infarct in the left MCA distribution as above    02/13/20 EEG Classification / Summary:  Abnormal EEG in the sedated state due to:  1) Rare left posterior quadrant ( max T7/P7) spike and wave discharges.   2) Continuous left hemispheric theta/ polymorphic delta present.  Left sided attenuation of fast activity  3) Moderate diffuse slowing.     Clinical Impression:  1) Potential epileptogenic focus in the left posterior quadrant region.  2) Structural abnormality in the left hemisphere involving grey and white matter.   3) Moderate diffuse or multifocal cerebral dysfunction.    No electrographic seizures seen.    TTE with Doppler (w/Cont) (02.12.20 @ 05:49) >  Conclusions:  1. Mitral annular calcification, otherwise normal mitral valve. Mild mitral regurgitation.  2. Calcified trileaflet aortic valve with normal opening. Minimal aortic regurgitation.  3. Normal left ventricular systolic function. No segmentalwall motion abnormalities. Endocardial visualization  enhanced with intravenous injection of Ultrasonic Enhancing Agent (Definity). No left ventricular thrombus.  4. Normal diastolic function  5. Right ventricular enlargement with normal right ventricular systolic function.  6. Normal pericardium with no pericardial effusion.  *** No previous Echo exam. SUMMARY:   78 y/o woman w/ h/o HTN, DM II and HLD initially presented to [facility] on 2/10/20 after being found unresponsive by family at 4 pm. LKN 9:30pm on 2/9/20. At baseline, she is independent and lives alone. At [facility] she was noted to be globally aphasic with RUE hemiplegia. CT head revealed dense L. MCA sign & acute L. MCA infarction. CTA revealed a L.M1 occlusion. She reportedly presented as a GCS 8 but declined to GCS 4, and was intubated for airway protection on 02/10.  Transferred to [facility] ED, remained intubated, admitted to NSCU service, originally boarded in MICU, being transferred to NSCU 02/14.  Was not a thrombectomy or tPA candidate.    2/11: was started on TF, ASA and DVT PPX  2/12: was started on CPAP trials , EEG no seizure   2/13: AF with RVR,  s/p IV BB and CCB with no response , started on amio gtt,         ICU Vital Signs Last 24 Hrs  T(C): 37.4 (14 Feb 2020 08:45), Max: 38.2 (13 Feb 2020 20:00)  T(F): 99.4 (14 Feb 2020 08:45), Max: 100.8 (13 Feb 2020 20:00)  HR: 133 (14 Feb 2020 11:05) (53 - 148)  BP: 141/87 (14 Feb 2020 11:05) (137/93 - 221/84)  BP(mean): 102 (14 Feb 2020 11:05) (102 - 124)  ABP: --  ABP(mean): --  RR: 24 (14 Feb 2020 11:05) (18 - 29)  SpO2: 97% (14 Feb 2020 11:05) (96% - 100%)      02-13-20 @ 07:01  -  02-14-20 @ 07:00  --------------------------------------------------------  IN: 3833.1 mL / OUT: 1900 mL / NET: 1933.1 mL    02-14-20 @ 07:01  -  02-14-20 @ 11:20  --------------------------------------------------------  IN: 519.9 mL / OUT: 350 mL / NET: 169.9 mL      Mode: AC/ CMV (Assist Control/ Continuous Mandatory Ventilation), RR (machine): 12, TV (machine): 500, FiO2: 40, PEEP: 5, ITime: 1, MAP: 9, PIP: 15    acetaminophen   Tablet .. 650 milliGRAM(s) Oral every 6 hours PRN  aMIOdarone Infusion 1 mG/Min (33.333 mL/Hr) IV Continuous <Continuous>  aMIOdarone Infusion 0.5 mG/Min (16.667 mL/Hr) IV Continuous <Continuous>  amLODIPine   Tablet 5 milliGRAM(s) Oral daily  aspirin  chewable 81 milliGRAM(s) Oral daily  atorvastatin 80 milliGRAM(s) Oral at bedtime  cefTRIAXone   IVPB 1000 milliGRAM(s) IV Intermittent every 24 hours  chlorhexidine 0.12% Liquid 15 milliLiter(s) Oral Mucosa every 12 hours  chlorhexidine 4% Liquid 1 Application(s) Topical <User Schedule>  clocortolone 0.1% 1 Application(s) 1 Application(s) Topical two times a day  enoxaparin Injectable 40 milliGRAM(s) SubCutaneous <User Schedule>  fentaNYL    Injectable 25 MICROGram(s) IV Push every 4 hours PRN  furosemide   Injectable 10 milliGRAM(s) IV Push once  insulin lispro (HumaLOG) corrective regimen sliding scale   SubCutaneous every 6 hours  insulin NPH human recombinant 6 Unit(s) SubCutaneous every 6 hours  metoprolol tartrate 50 milliGRAM(s) Oral two times a day  pantoprazole  Injectable 40 milliGRAM(s) IV Push daily  polyethylene glycol 3350 17 Gram(s) Oral two times a day  senna 2 Tablet(s) Oral at bedtime  valsartan 160 milliGRAM(s) Oral two times a day                            11.2   13.65 )-----------( 167      ( 14 Feb 2020 04:10 )             36.6     02-14    152<H>  |  119<H>  |  26<H>  ----------------------------<  266<H>  3.4<L>   |  22  |  0.54    Ca    8.6      14 Feb 2020 04:10  Phos  2.6     02-14  Mg     2.4     02-14        ABG - ( 14 Feb 2020 06:16 )  pH, Arterial: 7.50  pH, Blood: x     /  pCO2: 31    /  pO2: 104   / HCO3: 24    / Base Excess: 1.9   /  SaO2: 99                      EXAMINATION:  General: No acute distress  HEENT: Anicteric sclerae  Cardiac: [id number]  Lungs: Clear  Abdomen: Soft, non-tender, +BS  Extremities: No c/c/e  Skin/Incision Site: Clean, dry and intact  Neurologic: Eyes open to noxious stimuli, no gaze deviation, no command following, PERR 3mm, +cough/gag, Left side spontaneously AG,  right side hemiplegic with triple flexion.    Right wrist with mild edema and small bruise      CAPILLARY BLOOD GLUCOSE  POCT Blood Glucose.: 209 mg/dL (14 Feb 2020 07:52)  POCT Blood Glucose.: 226 mg/dL (14 Feb 2020 00:11)  POCT Blood Glucose.: 237 mg/dL (13 Feb 2020 17:24)  POCT Blood Glucose.: 276 mg/dL (13 Feb 2020 11:36)    CT Brain Stroke Protocol (02.10.20 @ 16:58) There is a hyperdense appearing left middle cerebral artery. There is corresponding hypoattenuation within the left MCA territory within the left frontal lobe extending to the left insula. Hypoattenuation is also noted within the left anterior temporal lobe. Hypoattenuation extends into the superior left basal gangliaand corona radiata. There is no hemorrhagic transformation.    There is no hydrocephalus, shift of the midline structures, herniation, or abnormal intra-axial fluid collection.    Polyps versus retention cysts are noted within the right maxillary andleft sphenoid sinuses. Otherwise, the paranasal sinuses and mastoid air cells are clear. The calvarium appears intact. There is evidence of bilateral cataract removal.    IMPRESSION: Acute left MCA distribution infarction of L. frontal lobe extending to L.insula with an associated hyperdense left MCA sign. No hemorrhagic transformation.    CT Head No Cont (02.11.20 @ 10:38)  IMPRESSION:  Interval demarcation of a large left MCA territory infarct. No CT evidence for renea hemorrhagic transformation.  New mass effect upon the left lateral ventricle. New minimal rightward midline shift. No effacement of the basal cisterns. No hydrocephalus.    CT Angio Neck w/ IV Cont (02.10.20 @ 19:55)  There is persistent, somewhat more extensive cytotoxic edema surrounding the left insular cortex with persistent hyperdensity of the left M1 segment. There is no hemorrhage. There is mild mass effect with slight lateral ventricular effacement. Edema involves most of the lateral left basal ganglia. CTA confirms focal cut off of the left M1 segment approximately 8 mm distal to the origin. There is faint enhancement distal to this point implying either incomplete obstruction or collateralization of distal branches. There is normal appearance of the right MCA and both posterior cerebral arteries. There is a patent anterior communicating artery. The left posterior cerebral arteries primarily fed from the anterior circulation. There is a slightly diminutive left A1 segment.  Examination of the cervical circulation is negative for carotid or vertebral artery stenosis, occlusion or dissection.    IMPRESSION: Expanding zone of edema related to maturing infarct in the left MCA distribution as above    02/13/20 EEG Classification / Summary:  Abnormal EEG in the sedated state due to:  1) Rare left posterior quadrant ( max T7/P7) spike and wave discharges.   2) Continuous left hemispheric theta/ polymorphic delta present.  Left sided attenuation of fast activity  3) Moderate diffuse slowing.     Clinical Impression:  1) Potential epileptogenic focus in the left posterior quadrant region.  2) Structural abnormality in the left hemisphere involving grey and white matter.   3) Moderate diffuse or multifocal cerebral dysfunction.    No electrographic seizures seen.    TTE with Doppler (w/Cont) (02.12.20 @ 05:49) >  Conclusions:  1. Mitral annular calcification, otherwise normal mitral valve. Mild mitral regurgitation.  2. Calcified trileaflet aortic valve with normal opening. Minimal aortic regurgitation.  3. Normal left ventricular systolic function. No segmentalwall motion abnormalities. Endocardial visualization  enhanced with intravenous injection of Ultrasonic Enhancing Agent (Definity). No left ventricular thrombus.  4. Normal diastolic function  5. Right ventricular enlargement with normal right ventricular systolic function.  6. Normal pericardium with no pericardial effusion.  *** No previous Echo exam.

## 2020-02-14 NOTE — PROGRESS NOTE ADULT - PROBLEM SELECTOR PLAN 7
Went back to the room and daughter was at the bedside. Discussion with daughter about her mother and goals of care. She states that she knows for sure her mother would never have wanted a life attached to machines and that she has no plans for trach/PEG. 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 She states her mother was depressed at one time, but always has been consistent that she would never want a life that made her bedbound. She witnessed another family member who suffered a stroke and was bedbound for months and was clear this was not what she would have ever wanted. Daughter, however, remains feeling conflicted about "making her mother die" if she decides to remove the machines, however, is consistent this is not a way of life for her. The patient did show some signs of neurologic changes today in that she is somewhat responsive to commands today which she hasn't been all week. Unclear if movements are completely purposeful, however, it seems appropriate for her to see what the next few days brings given this new change today. Daughter aware that the team will follow up over the weekend and that I will meet with her again on Tuesday. Discussed compassionate vs. medical extubations. Discussed a DNR which the daughter is agreeable to at this time given that the risks of CPR are significant and could worsen her mother's condition. ENRICO done with DNR. Lack of capacity form filled out.    Daughter Danni Hamilton would like to be called on her cell phone: [phone]

## 2020-02-14 NOTE — PROGRESS NOTE ADULT - ASSESSMENT
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

## 2020-02-14 NOTE — PROGRESS NOTE ADULT - SUBJECTIVE AND OBJECTIVE BOX
HPI:  79 year old female with PMH of HTN, DM, HLD presents to the [facility] ED as a stroke transfer. She initially presented to [facility]. Her daughter found her unresponsive at 4pm with Right sided weakness. LKN was 9:30 pm on . At baseline, she is independent and lives alone. At [facility], she was found to have right arm plegia and was globally aphasic. CT head revealed dense L MCA sign and acute L MCA infarction. CTA h/n revealed a L M1 occlusion. At [facility], her GCS fell from 8 to 4 and patient was intubated. No TPA given and no thrombectomy offered due to outside window.  NIHSS 19. MRS 0. (10 Feb 2020 23:55)    PERTINENT PM/SXH:   Basal cell carcinoma  Gallbladder polyp  Chronic bronchitis, simple  Dyslipidemia  Diabetes  Hypertension    S/P colonoscopy  S/P cataract extraction, unspecified laterality  S/P hysterectomy with oophorectomy  S/P breast biopsy, right    FAMILY HISTORY:  Family history of colon cancer (Sibling)  Family history of CVA  Family history of borderline diabetes mellitus    ITEMS NOT CHECKED ARE NOT PRESENT    SOCIAL HISTORY:   Significant other/partner:  [ ]  Children:  [ ]  Restorationism/Spirituality:  Substance hx:  [ ]   Tobacco hx:  [ ]   Alcohol hx: [ ]   Home Opioid hx:  [ ] I-Stop Reference No:  Living Situation: [ ]Home  [ ]Long term care  [ ]Rehab [ ]Other    ADVANCE DIRECTIVES:    DNR  MOLST  [ ]  Living Will  [ ]   DECISION MAKER(s):  [ ] Health Care Proxy(s)  [x ] Surrogate(s)-as per GILBERTO Ruiz, daughter is the surrogate-unable to reach her  [ ] Guardian           Name(s): Phone Number(s):    BASELINE (I)ADL(s) (prior to admission):  [place]: [ x]Total  [ ] Moderate [ ]Dependent    Allergies    No Known Allergies    Intolerances    MEDICATIONS  (STANDING):  aMIOdarone Infusion 1 mG/Min (33.333 mL/Hr) IV Continuous <Continuous>  aMIOdarone Infusion 0.5 mG/Min (16.667 mL/Hr) IV Continuous <Continuous>  aspirin  chewable 81 milliGRAM(s) Oral daily  atorvastatin 80 milliGRAM(s) Oral at bedtime  chlorhexidine 0.12% Liquid 15 milliLiter(s) Oral Mucosa every 12 hours  chlorhexidine 4% Liquid 1 Application(s) Topical <User Schedule>  clocortolone 0.1% 1 Application(s) 1 Application(s) Topical two times a day  diltiazem Infusion 7 mG/Hr (7 mL/Hr) IV Continuous <Continuous>  enoxaparin Injectable 40 milliGRAM(s) SubCutaneous <User Schedule>  insulin regular Infusion 2 Unit(s)/Hr (2 mL/Hr) IV Continuous <Continuous>  metoprolol tartrate 50 milliGRAM(s) Oral two times a day  pantoprazole  Injectable 40 milliGRAM(s) IV Push daily  piperacillin/tazobactam IVPB.. 3.375 Gram(s) IV Intermittent every 8 hours  polyethylene glycol 3350 17 Gram(s) Oral two times a day  senna 2 Tablet(s) Oral at bedtime  valsartan 160 milliGRAM(s) Oral two times a day    MEDICATIONS  (PRN):  acetaminophen   Tablet .. 650 milliGRAM(s) Oral every 6 hours PRN Temp greater or equal to 38C (100.4F)  fentaNYL    Injectable 25 MICROGram(s) IV Push every 4 hours PRN restlessness    PRESENT SYMPTOMS: [x ]Unable to obtain due to poor mentation   Source if other than patient:  [ ]Family   [ ]Team     Pain: [ ] yes [ ] no  QOL impact -   Location -                    Aggravating factors -  Quality -  Radiation -  Timing-  Severity (0-10 scale):  Minimal acceptable level (0-10 scale):     PAIN AD Score: 4    [email or web address] (press ctrl +  left click to view)    Dyspnea:                           [ ]Mild [ ]Moderate [ ]Severe  Anxiety:                             [ ]Mild [ ]Moderate [ ]Severe  Fatigue:                             [ ]Mild [ ]Moderate [ ]Severe  Nausea:                             [ ]Mild [ ]Moderate [ ]Severe  Loss of appetite:              [ ]Mild [ ]Moderate [ ]Severe  Constipation:                    [ ]Mild [ ]Moderate [ ]Severe    Other Symptoms:  [ ]All other review of systems negative     Karnofsky Performance Score/Palliative Performance Status Version 2:        10%    http://npcrc.org/files/news/palliative_performance_scale_ppsv2.pdf  PHYSICAL EXAM:  Vital Signs Last 24 Hrs  T(C): 37.4 (2020 08:45), Max: 38.2 (2020 20:00)  T(F): 99.4 (2020 08:45), Max: 100.8 (2020 20:00)  HR: 61 (2020 14:15) (53 - 148)  BP: 119/53 (2020 14:15) (119/53 - 199/82)  BP(mean): 72 (2020 14:15) (72 - 140)  RR: 20 (2020 14:15) (18 - 27)  SpO2: 97% (2020 14:15) (96% - 100%) I&O's Summary    2020 07:01  -  2020 07:00  --------------------------------------------------------  IN: 3833.1 mL / OUT: 1900 mL / NET: 1933.1 mL    2020 07:01  -  2020 14:50  --------------------------------------------------------  IN: 946.8 mL / OUT: 350 mL / NET: 596.8 mL    GENERAL:  [ ]Alert  [ ]Oriented x   [ x]Lethargic  [ ]Cachexia  [ ]Unarousable  [ ]Verbal  [x ]Non-Verbal   Behavioral:   [ ] Anxiety  [ ] Delirium [x ] Agitation [ ] Other  HEENT:  [ ]Normal   [ ]Dry mouth   [x ]ET Tube/Trach  [ ]Oral lesions  PULMONARY:   [ ]Clear [ ]Tachypnea  [ ]Audible excessive secretions   [x ]Rhonchi        [ ]Right [ ]Left [ ]Bilateral  [ ]Crackles        [ ]Right [ ]Left [ ]Bilateral  [ ]Wheezing     [ ]Right [ ]Left [ ]Bilateral  CARDIOVASCULAR:    [x ]Regular [ ]Irregular [ ]Tachy  [ ]Fan [ ]Murmur [ ]Other  GASTROINTESTINAL:  [x ]Soft  [ ]Distended   [ x]+BS  [ ]Non tender [ ]Tender  [ ]PEG [ ]OGT/ NGT  Last BM: 20  GENITOURINARY:  [ ]Normal [ ] Incontinent   [ ]Oliguria/Anuria   [x ]Michael  MUSCULOSKELETAL:   [ ]Normal   [ ]Weakness  [x ]Bed/Wheelchair bound [ x]Edema-right arm  NEUROLOGIC:   [ ]No focal deficits  x[ ] Cognitive impairment  [x ] Dysphagia [ ]Dysarthria [ ] Paresis [ ]Other   SKIN:   [ ]Normal   [ ]Pressure ulcer(s)  [ ]Rash    CRITICAL CARE:  [ ] Shock Present  [ ]Septic [ ]Cardiogenic [ ]Neurologic [ ]Hypovolemic  [ ]  Vasopressors [ ]  Inotropes   [x ] Respiratory failure present [x ] mechanical ventilation [ ] non-invasive ventilatory support [ ] High flow  [x ] Acute  [ ] Chronic [ ] Hypoxic  [ ] Hypercarbic [ ] Other  [ ] Other organ failure     LABS:                        11.2   13.65 )-----------( 167      ( 2020 04:10 )             36.6   02-14    149<H>  |  112<H>  |  23  ----------------------------<  285<H>  3.5   |  23  |  0.59    Ca    8.8      2020 12:50  Phos  2.5     02-14  Mg     2.2     02-14        Urinalysis Basic - ( 2020 03:20 )    Color: Yellow / Appearance: Slightly Turbid / S.023 / pH: x  Gluc: x / Ketone: Trace  / Bili: Negative / Urobili: Negative   Blood: x / Protein: Trace / Nitrite: Negative   Leuk Esterase: Large / RBC: 8 /hpf / WBC 56 /HPF   Sq Epi: x / Non Sq Epi: 0 /hpf / Bacteria: Many      RADIOLOGY & ADDITIONAL STUDIES:    PROTEIN CALORIE MALNUTRITION PRESENT: [ ] Yes [ ] No  [x ] PPSV2 < or = to 30% [ ] significant weight loss  [ ] poor nutritional intake [ ] catabolic state [ ] anasarca     Albumin, Serum: 4.1 g/dL (02-10-20 @ 17:46)  Artificial Nutrition [ ]     REFERRALS:   [ ]Chaplaincy  [ ] Hospice  [ ]Child Life  [ ]Social Work  [ ]Case management [ ]Holistic Therapy     Goals of Care Document:   Care Coordination Assessment [C. Provider] (02-12-20 @ 11:45)   Progress Notes - Care Coordination [C. Provider] (20 @ 14:18)

## 2020-02-14 NOTE — EEG REPORT - NS EEG TEXT BOX
[facility]  Comprehensive Epilepsy Center  Report of Continuous Video EEG    [facility]: [address] , [address], [place] [address], Ph#: [phone]  [facility]: [id number] [place] [address], Ph#: [phone]  Office: [address] [place] [address], [facility] [address], [place] [address] Ph#: [phone]    Patient Name: STORM BROWNING  Age: 79y Gender:  Female   MRN #: [id number], Location:  [address]    Study Date: 02 -13-20 08:00   to 02-13-20 20:50  13hrs    Study Information:    EEG Recording Technique:  The patient underwent continuous Video-EEG monitoring, using Telemetry System hardware on the XLTek Digital System. EEG and video data were stored on a computer hard drive with important events saved in digital archive files. The material was reviewed by a physician (electroencephalographer / epileptologist) on a daily basis. Adrian and seizure detection algorithms were utilized and reviewed. An EEG Technician attended to the patient, and was available throughout daytime work hours.  The epilepsy center neurologist was available in person or on call 24-hours per day.    EEG Placement and Labeling of Electrodes:  The EEG was performed utilizing 20 channel referential EEG connections (coronal over temporal over parasagittal montage) using all standard 10-20 electrode placements with EKG, with additional electrodes placed in the inferior temporal region using the modified 10-10 montage electrode placements for elective admissions, or if deemed necessary. Recording was at a sampling rate of 256 samples per second per channel. Time synchronized digital video recording was done simultaneously with EEG recording. A low light infrared camera was used for low light recording.     History:  Medications: atorvastatin 80 milliGRAM(s) Oral at bedtime  chlorhexidine 0.12% Liquid 15 milliLiter(s) Oral Mucosa every 12 hours  chlorhexidine 4% Liquid 1 Application(s) Topical <User Schedule>  dexMEDEtomidine Infusion 0.2 MICROgram(s)/kG/Hr IV Continuous <Continuous>  insulin lispro (HumaLOG) corrective regimen sliding scale   SubCutaneous every 6 hours  metoprolol tartrate 25 milliGRAM(s) Oral two times a day  pantoprazole  Injectable 40 milliGRAM(s) IV Push daily  sodium chloride 0.9%. 1000 milliLiter(s) IV Continuous <Continuous>  valsartan 160 milliGRAM(s) Oral two times a day    Interpretation:  Daily EEG Visual Analysis    FINDINGS:  The background was continuous, spontaneously variable, and reactive. No posterior dominant rhythm seen over the left.  PDR to 9 seen over the right to 35uV.  There was diffuse irregular theta/ polymorphic delta present.     Left sided attenuation of fast activity  Focal slowing: Continuous left hemispheric theta/ polymorphic delta present.     Sleep Background:  Stage II transients: spindles seen,    Interictal Epileptiform Abnormalities: rare left posterior temporoparietal ( max T7/P7/P3) spike and wave discharges.     Ictal Abnormalities:  -No event captured.  -No electrographic seizures seen.     Activation Procedures:   Hyperventilation was not performed.    Photic stimulation was not performed.    Artifacts:  Intermittent myogenic artifacts were noted.    ECG:  The heart rate on single channel ECG was predominantly between 60-70 BPM.    _____________________________________________________________  EEG Classification / Summary:  Abnormal EEG in the sedated state due to:  1) Rare left posterior temporoparietal sharp wave discharges.   2) Continuous left hemispheric theta/ polymorphic delta present.  Left sided attenuation of fast activity  3) Moderate diffuse slowing.     Clinical Impression:  1) Potential epileptogenic focus in the left posterior temporoparietal region.  2) Structural abnormality in the left hemisphere involving grey and white matter.   3) Moderate diffuse or multifocal cerebral dysfunction, improved vs prior day.    No electrographic seizures seen.  ______________________________________________________________    Lonnie uLdwig DO  Epilepsy Fellow, [facility]    MD DANAE SwansonES  Director, Continuous EEG Monitoring Service, [facility]    and Epilepsy Fellowship , Department of Neurology  [facility]

## 2020-02-14 NOTE — PROGRESS NOTE ADULT - PROBLEM SELECTOR PLAN 6
Called to evaluate patient in the NSCU with a catastrophic CVA. Daughter unavailable during time of assessment and no answer on phone number in the chart. GOC discussions had been initiated by NSCU attending, Dr. Ghosh. Plan to follow up with daughter who appears to be the [facility]. Will continue to reach out to her and team made aware to let palliative team know when she arrives back to the bedside.    Remains full code at this time.

## 2020-02-14 NOTE — CONSULT NOTE ADULT - PROBLEM SELECTOR RECOMMENDATION 7
Called to evaluate patient in the NSCU with a catastrophic CVA. Daughter unavailable during time of assessment and no answer on phone number in the chart. GOC discussions had been initiated by NSCU attending, Dr. Ghosh. See ACP as discussion later on occurred with daughter at bedside.

## 2020-02-15 LAB
-  AMIKACIN: SIGNIFICANT CHANGE UP
-  AMPICILLIN/SULBACTAM: SIGNIFICANT CHANGE UP
-  AMPICILLIN: SIGNIFICANT CHANGE UP
-  AZTREONAM: SIGNIFICANT CHANGE UP
-  CEFAZOLIN: SIGNIFICANT CHANGE UP
-  CEFEPIME: SIGNIFICANT CHANGE UP
-  CEFOXITIN: SIGNIFICANT CHANGE UP
-  CEFTRIAXONE: SIGNIFICANT CHANGE UP
-  CIPROFLOXACIN: SIGNIFICANT CHANGE UP
-  GENTAMICIN: SIGNIFICANT CHANGE UP
-  IMIPENEM: SIGNIFICANT CHANGE UP
-  LEVOFLOXACIN: SIGNIFICANT CHANGE UP
-  MEROPENEM: SIGNIFICANT CHANGE UP
-  NITROFURANTOIN: SIGNIFICANT CHANGE UP
-  PIPERACILLIN/TAZOBACTAM: SIGNIFICANT CHANGE UP
-  TIGECYCLINE: SIGNIFICANT CHANGE UP
-  TOBRAMYCIN: SIGNIFICANT CHANGE UP
-  TRIMETHOPRIM/SULFAMETHOXAZOLE: SIGNIFICANT CHANGE UP
CULTURE RESULTS: SIGNIFICANT CHANGE UP
GRAM STN FLD: SIGNIFICANT CHANGE UP
METHOD TYPE: SIGNIFICANT CHANGE UP
ORGANISM # SPEC MICROSCOPIC CNT: SIGNIFICANT CHANGE UP
ORGANISM # SPEC MICROSCOPIC CNT: SIGNIFICANT CHANGE UP
SPECIMEN SOURCE: SIGNIFICANT CHANGE UP
SPECIMEN SOURCE: SIGNIFICANT CHANGE UP

## 2020-02-15 PROCEDURE — 99291 CRITICAL CARE FIRST HOUR: CPT

## 2020-02-15 PROCEDURE — 71045 X-RAY EXAM CHEST 1 VIEW: CPT | Mod: 26

## 2020-02-15 RX ORDER — PIPERACILLIN AND TAZOBACTAM 4; .5 G/20ML; G/20ML
3.38 INJECTION, POWDER, LYOPHILIZED, FOR SOLUTION INTRAVENOUS EVERY 8 HOURS
Refills: 0 | Status: DISCONTINUED | OUTPATIENT
Start: 2020-02-15 | End: 2020-02-16

## 2020-02-15 RX ORDER — INSULIN LISPRO 100/ML
VIAL (ML) SUBCUTANEOUS EVERY 6 HOURS
Refills: 0 | Status: DISCONTINUED | OUTPATIENT
Start: 2020-02-15 | End: 2020-02-15

## 2020-02-15 RX ORDER — CALCIUM GLUCONATE 100 MG/ML
1 VIAL (ML) INTRAVENOUS ONCE
Refills: 0 | Status: COMPLETED | OUTPATIENT
Start: 2020-02-15 | End: 2020-02-15

## 2020-02-15 RX ORDER — AMIODARONE HYDROCHLORIDE 400 MG/1
200 TABLET ORAL DAILY
Refills: 0 | Status: DISCONTINUED | OUTPATIENT
Start: 2020-02-15 | End: 2020-02-16

## 2020-02-15 RX ORDER — OXYCODONE HYDROCHLORIDE 5 MG/1
10 TABLET ORAL EVERY 4 HOURS
Refills: 0 | Status: DISCONTINUED | OUTPATIENT
Start: 2020-02-15 | End: 2020-02-15

## 2020-02-15 RX ORDER — HYDROMORPHONE HYDROCHLORIDE 2 MG/ML
0.5 INJECTION INTRAMUSCULAR; INTRAVENOUS; SUBCUTANEOUS
Refills: 0 | Status: DISCONTINUED | OUTPATIENT
Start: 2020-02-15 | End: 2020-02-15

## 2020-02-15 RX ORDER — HUMAN INSULIN 100 [IU]/ML
5 INJECTION, SUSPENSION SUBCUTANEOUS EVERY 6 HOURS
Refills: 0 | Status: DISCONTINUED | OUTPATIENT
Start: 2020-02-15 | End: 2020-02-15

## 2020-02-15 RX ORDER — MORPHINE SULFATE 50 MG/1
2 CAPSULE, EXTENDED RELEASE ORAL
Refills: 0 | Status: DISCONTINUED | OUTPATIENT
Start: 2020-02-15 | End: 2020-02-17

## 2020-02-15 RX ORDER — OXYCODONE HYDROCHLORIDE 5 MG/1
5 TABLET ORAL EVERY 4 HOURS
Refills: 0 | Status: DISCONTINUED | OUTPATIENT
Start: 2020-02-15 | End: 2020-02-15

## 2020-02-15 RX ORDER — DEXMEDETOMIDINE HYDROCHLORIDE IN 0.9% SODIUM CHLORIDE 4 UG/ML
0.3 INJECTION INTRAVENOUS
Qty: 200 | Refills: 0 | Status: DISCONTINUED | OUTPATIENT
Start: 2020-02-15 | End: 2020-02-15

## 2020-02-15 RX ORDER — METOPROLOL TARTRATE 50 MG
5 TABLET ORAL ONCE
Refills: 0 | Status: COMPLETED | OUTPATIENT
Start: 2020-02-15 | End: 2020-02-15

## 2020-02-15 RX ORDER — MORPHINE SULFATE 50 MG/1
2 CAPSULE, EXTENDED RELEASE ORAL
Refills: 0 | Status: DISCONTINUED | OUTPATIENT
Start: 2020-02-15 | End: 2020-02-16

## 2020-02-15 RX ORDER — ACETAMINOPHEN 500 MG
650 TABLET ORAL EVERY 6 HOURS
Refills: 0 | Status: DISCONTINUED | OUTPATIENT
Start: 2020-02-15 | End: 2020-02-29

## 2020-02-15 RX ADMIN — Medication 50 MILLIGRAM(S): at 05:18

## 2020-02-15 RX ADMIN — FENTANYL CITRATE 25 MICROGRAM(S): 50 INJECTION INTRAVENOUS at 09:45

## 2020-02-15 RX ADMIN — FENTANYL CITRATE 25 MICROGRAM(S): 50 INJECTION INTRAVENOUS at 07:00

## 2020-02-15 RX ADMIN — PANTOPRAZOLE SODIUM 40 MILLIGRAM(S): 20 TABLET, DELAYED RELEASE ORAL at 12:52

## 2020-02-15 RX ADMIN — FENTANYL CITRATE 25 MICROGRAM(S): 50 INJECTION INTRAVENOUS at 06:45

## 2020-02-15 RX ADMIN — HUMAN INSULIN 5 UNIT(S): 100 INJECTION, SUSPENSION SUBCUTANEOUS at 12:58

## 2020-02-15 RX ADMIN — FENTANYL CITRATE 25 MICROGRAM(S): 50 INJECTION INTRAVENOUS at 09:35

## 2020-02-15 RX ADMIN — Medication 50 MILLIGRAM(S): at 17:20

## 2020-02-15 RX ADMIN — OXYCODONE HYDROCHLORIDE 5 MILLIGRAM(S): 5 TABLET ORAL at 04:45

## 2020-02-15 RX ADMIN — Medication 100 GRAM(S): at 06:18

## 2020-02-15 RX ADMIN — FENTANYL CITRATE 25 MICROGRAM(S): 50 INJECTION INTRAVENOUS at 13:15

## 2020-02-15 RX ADMIN — Medication 0.5 MILLIGRAM(S): at 21:49

## 2020-02-15 RX ADMIN — FENTANYL CITRATE 25 MICROGRAM(S): 50 INJECTION INTRAVENOUS at 01:18

## 2020-02-15 RX ADMIN — OXYCODONE HYDROCHLORIDE 10 MILLIGRAM(S): 5 TABLET ORAL at 13:00

## 2020-02-15 RX ADMIN — OXYCODONE HYDROCHLORIDE 10 MILLIGRAM(S): 5 TABLET ORAL at 14:00

## 2020-02-15 RX ADMIN — AMIODARONE HYDROCHLORIDE 200 MILLIGRAM(S): 400 TABLET ORAL at 09:57

## 2020-02-15 RX ADMIN — FENTANYL CITRATE 25 MICROGRAM(S): 50 INJECTION INTRAVENOUS at 13:30

## 2020-02-15 RX ADMIN — PIPERACILLIN AND TAZOBACTAM 25 GRAM(S): 4; .5 INJECTION, POWDER, LYOPHILIZED, FOR SOLUTION INTRAVENOUS at 09:57

## 2020-02-15 RX ADMIN — PIPERACILLIN AND TAZOBACTAM 25 GRAM(S): 4; .5 INJECTION, POWDER, LYOPHILIZED, FOR SOLUTION INTRAVENOUS at 01:15

## 2020-02-15 RX ADMIN — CHLORHEXIDINE GLUCONATE 15 MILLILITER(S): 213 SOLUTION TOPICAL at 17:20

## 2020-02-15 RX ADMIN — Medication 5 MILLIGRAM(S): at 13:30

## 2020-02-15 RX ADMIN — OXYCODONE HYDROCHLORIDE 5 MILLIGRAM(S): 5 TABLET ORAL at 04:15

## 2020-02-15 RX ADMIN — FENTANYL CITRATE 25 MICROGRAM(S): 50 INJECTION INTRAVENOUS at 01:48

## 2020-02-15 RX ADMIN — CHLORHEXIDINE GLUCONATE 15 MILLILITER(S): 213 SOLUTION TOPICAL at 05:19

## 2020-02-15 RX ADMIN — ATORVASTATIN CALCIUM 80 MILLIGRAM(S): 80 TABLET, FILM COATED ORAL at 21:43

## 2020-02-15 RX ADMIN — Medication 81 MILLIGRAM(S): at 12:52

## 2020-02-15 RX ADMIN — VALSARTAN 160 MILLIGRAM(S): 80 TABLET ORAL at 17:20

## 2020-02-15 RX ADMIN — POLYETHYLENE GLYCOL 3350 17 GRAM(S): 17 POWDER, FOR SOLUTION ORAL at 06:18

## 2020-02-15 RX ADMIN — VALSARTAN 160 MILLIGRAM(S): 80 TABLET ORAL at 05:19

## 2020-02-15 RX ADMIN — PIPERACILLIN AND TAZOBACTAM 25 GRAM(S): 4; .5 INJECTION, POWDER, LYOPHILIZED, FOR SOLUTION INTRAVENOUS at 21:43

## 2020-02-15 RX ADMIN — ENOXAPARIN SODIUM 40 MILLIGRAM(S): 100 INJECTION SUBCUTANEOUS at 17:20

## 2020-02-15 RX ADMIN — FENTANYL CITRATE 25 MICROGRAM(S): 50 INJECTION INTRAVENOUS at 04:13

## 2020-02-15 RX ADMIN — FENTANYL CITRATE 25 MICROGRAM(S): 50 INJECTION INTRAVENOUS at 04:43

## 2020-02-15 NOTE — PROGRESS NOTE ADULT - SUBJECTIVE AND OBJECTIVE BOX
Subjective: Patient seen and examined. No new events except as noted.     SUBJECTIVE/ROS:  ROS is limited as pt currently sedated on ventilator.       MEDICATIONS:  MEDICATIONS  (STANDING):  aMIOdarone    Tablet 200 milliGRAM(s) Oral daily  aspirin  chewable 81 milliGRAM(s) Oral daily  atorvastatin 80 milliGRAM(s) Oral at bedtime  chlorhexidine 0.12% Liquid 15 milliLiter(s) Oral Mucosa every 12 hours  chlorhexidine 4% Liquid 1 Application(s) Topical <User Schedule>  clocortolone 0.1% 1 Application(s) 1 Application(s) Topical two times a day  enoxaparin Injectable 40 milliGRAM(s) SubCutaneous <User Schedule>  insulin regular Infusion 2 Unit(s)/Hr (2 mL/Hr) IV Continuous <Continuous>  metoprolol tartrate 50 milliGRAM(s) Oral two times a day  pantoprazole  Injectable 40 milliGRAM(s) IV Push daily  piperacillin/tazobactam IVPB.. 3.375 Gram(s) IV Intermittent every 8 hours  polyethylene glycol 3350 17 Gram(s) Oral two times a day  senna 2 Tablet(s) Oral at bedtime  valsartan 160 milliGRAM(s) Oral two times a day      PHYSICAL EXAM:  T(C): 37.2 (02-15-20 @ 03:00), Max: 37.8 (02-14-20 @ 19:00)  HR: 69 (02-15-20 @ 06:00) (52 - 148)  BP: 171/77 (02-15-20 @ 06:00) (116/58 - 186/96)  RR: 18 (02-15-20 @ 06:00) (16 - 27)  SpO2: 97% (02-15-20 @ 06:00) (96% - 100%)  Wt(kg): --  I&O's Summary    14 Feb 2020 07:01  -  15 Feb 2020 07:00  --------------------------------------------------------  IN: 2783.7 mL / OUT: 1150 mL / NET: 1633.7 mL        Appearance: on vent 	  Cardiovascular: Normal S1 S2,    Murmur:   Neck: JVP limited to be evaluated   Respiratory: Lungs few rhonchi   Gastrointestinal:  Soft  Skin: normal   Neuro: limited as pt on vent    LABS/DATA:    CARDIAC MARKERS:  CARDIAC MARKERS ( 14 Feb 2020 04:10 )  x     / x     / 124 U/L / x     / x      CARDIAC MARKERS ( 13 Feb 2020 14:37 )  x     / x     / 139 U/L / x     / x      CARDIAC MARKERS ( 13 Feb 2020 11:36 )  x     / x     / 166 U/L / x     / x      CARDIAC MARKERS ( 13 Feb 2020 00:47 )  x     / x     / 160 U/L / x     / x      CARDIAC MARKERS ( 12 Feb 2020 11:15 )  x     / x     / 202 U/L / x     / x                                    11.2   13.65 )-----------( 167      ( 14 Feb 2020 04:10 )             36.6     02-14    145  |  111<H>  |  28<H>  ----------------------------<  172<H>  4.9   |  24  |  0.67    Ca    8.3<L>      14 Feb 2020 22:59  Phos  2.6     02-14  Mg     2.3     02-14      proBNP:   Lipid Profile:   HgA1c:   TSH:     TELE:  EKG:

## 2020-02-15 NOTE — PROGRESS NOTE ADULT - ASSESSMENT
PAF  in sinus  convert to amio PO     Respiratory failure   on vent  suspect underlying PNA  on anbx    CVA  fu with neurology    fu with PC fo GOC discussion

## 2020-02-15 NOTE — PROGRESS NOTE ADULT - SUBJECTIVE AND OBJECTIVE BOX
· Subjective and Objective:   SUMMARY:   80 y/o woman w/ h/o HTN, DM II and HLD initially presented to [facility] on 2/10/20 after being found unresponsive by family at 4 pm. LKN 9:30pm on 2/9/20. At baseline, she is independent and lives alone. At [facility] she was noted to be globally aphasic with RUE hemiplegia. CT head revealed dense L. MCA sign & acute L. MCA infarction. CTA revealed a L.M1 occlusion. She reportedly presented as a GCS 8 but declined to GCS 4, and was intubated for airway protection on 02/10.  Transferred to [facility] ED, remained intubated, admitted to NSCU service, originally boarded in MICU, being transferred to NSCU 02/14.  Was not a thrombectomy or tPA candidate.    2/11: was started on TF, ASA and DVT PPX  2/12: was started on CPAP trials , EEG no seizure   2/13: AF with RVR,  s/p IV BB and CCB with no response , started on amio gtt,     T(C): 36.8 (02-15-20 @ 11:00), Max: 37.8 (02-14-20 @ 19:00)  HR: 80 (02-15-20 @ 11:00) (52 - 145)  BP: 174/73 (02-15-20 @ 11:00) (116/58 - 185/70)  RR: 17 (02-15-20 @ 11:00) (15 - 25)  SpO2: 97% (02-15-20 @ 11:00) (96% - 100%)  02-14-20 @ 07:01  -  02-15-20 @ 07:00  --------------------------------------------------------  IN: 2783.7 mL / OUT: 1150 mL / NET: 1633.7 mL    02-15-20 @ 07:01  -  02-15-20 @ 11:59  --------------------------------------------------------  IN: 406 mL / OUT: 0 mL / NET: 406 mL    acetaminophen   Tablet .. 650 milliGRAM(s) Oral every 6 hours PRN  aMIOdarone    Tablet 200 milliGRAM(s) Oral daily  aspirin  chewable 81 milliGRAM(s) Oral daily  atorvastatin 80 milliGRAM(s) Oral at bedtime  chlorhexidine 0.12% Liquid 15 milliLiter(s) Oral Mucosa every 12 hours  chlorhexidine 4% Liquid 1 Application(s) Topical <User Schedule>  clocortolone 0.1% 1 Application(s) 1 Application(s) Topical two times a day  enoxaparin Injectable 40 milliGRAM(s) SubCutaneous <User Schedule>  fentaNYL    Injectable 25 MICROGram(s) IV Push every 2 hours PRN  insulin regular Infusion 2 Unit(s)/Hr IV Continuous <Continuous>  metoprolol tartrate 50 milliGRAM(s) Oral two times a day  oxyCODONE    IR 5 milliGRAM(s) Oral every 4 hours PRN  oxyCODONE    IR 10 milliGRAM(s) Oral every 4 hours PRN  pantoprazole  Injectable 40 milliGRAM(s) IV Push daily  piperacillin/tazobactam IVPB.. 3.375 Gram(s) IV Intermittent every 8 hours  polyethylene glycol 3350 17 Gram(s) Oral two times a day  senna 2 Tablet(s) Oral at bedtime  valsartan 160 milliGRAM(s) Oral two times a day  Mode: CPAP with PS, FiO2: 40, PEEP: 5, PS: 10, MAP: 8, PIP: 12    EXAMINATION:  General: No acute distress  HEENT: Anicteric sclerae  Cardiac: [id number]  Lungs: Clear  Abdomen: Soft, non-tender, +BS  Extremities: No c/c/e  Skin/Incision Site: Clean, dry and intact  Neurologic: Eyes open to noxious stimuli, no gaze deviation, no command following, PERR 3mm, +cough/gag, Left side spontaneously AG,  right side hemiplegic with triple flexion.    Right wrist with mild edema and small bruise      CAPILLARY BLOOD GLUCOSE  POCT Blood Glucose.: 209 mg/dL (14 Feb 2020 07:52)  POCT Blood Glucose.: 226 mg/dL (14 Feb 2020 00:11)  POCT Blood Glucose.: 237 mg/dL (13 Feb 2020 17:24)  POCT Blood Glucose.: 276 mg/dL (13 Feb 2020 11:36)    CT Brain Stroke Protocol (02.10.20 @ 16:58) There is a hyperdense appearing left middle cerebral artery. There is corresponding hypoattenuation within the left MCA territory within the left frontal lobe extending to the left insula. Hypoattenuation is also noted within the left anterior temporal lobe. Hypoattenuation extends into the superior left basal gangliaand corona radiata. There is no hemorrhagic transformation.    There is no hydrocephalus, shift of the midline structures, herniation, or abnormal intra-axial fluid collection.    Polyps versus retention cysts are noted within the right maxillary andleft sphenoid sinuses. Otherwise, the paranasal sinuses and mastoid air cells are clear. The calvarium appears intact. There is evidence of bilateral cataract removal.    IMPRESSION: Acute left MCA distribution infarction of L. frontal lobe extending to L.insula with an associated hyperdense left MCA sign. No hemorrhagic transformation.    CT Head No Cont (02.11.20 @ 10:38)  IMPRESSION:  Interval demarcation of a large left MCA territory infarct. No CT evidence for renea hemorrhagic transformation.  New mass effect upon the left lateral ventricle. New minimal rightward midline shift. No effacement of the basal cisterns. No hydrocephalus.    CT Angio Neck w/ IV Cont (02.10.20 @ 19:55)  There is persistent, somewhat more extensive cytotoxic edema surrounding the left insular cortex with persistent hyperdensity of the left M1 segment. There is no hemorrhage. There is mild mass effect with slight lateral ventricular effacement. Edema involves most of the lateral left basal ganglia. CTA confirms focal cut off of the left M1 segment approximately 8 mm distal to the origin. There is faint enhancement distal to this point implying either incomplete obstruction or collateralization of distal branches. There is normal appearance of the right MCA and both posterior cerebral arteries. There is a patent anterior communicating artery. The left posterior cerebral arteries primarily fed from the anterior circulation. There is a slightly diminutive left A1 segment.  Examination of the cervical circulation is negative for carotid or vertebral artery stenosis, occlusion or dissection.    IMPRESSION: Expanding zone of edema related to maturing infarct in the left MCA distribution as above    02/13/20 EEG Classification / Summary:  Abnormal EEG in the sedated state due to:  1) Rare left posterior quadrant ( max T7/P7) spike and wave discharges.   2) Continuous left hemispheric theta/ polymorphic delta present.  Left sided attenuation of fast activity  3) Moderate diffuse slowing.     Clinical Impression:  1) Potential epileptogenic focus in the left posterior quadrant region.  2) Structural abnormality in the left hemisphere involving grey and white matter.   3) Moderate diffuse or multifocal cerebral dysfunction.    No electrographic seizures seen.    TTE with Doppler (w/Cont) (02.12.20 @ 05:49) >  Conclusions:  1. Mitral annular calcification, otherwise normal mitral valve. Mild mitral regurgitation.  2. Calcified trileaflet aortic valve with normal opening. Minimal aortic regurgitation.  3. Normal left ventricular systolic function. No segmentalwall motion abnormalities. Endocardial visualization  enhanced with intravenous injection of Ultrasonic Enhancing Agent (Definity). No left ventricular thrombus.  4. Normal diastolic function  5. Right ventricular enlargement with normal right ventricular systolic function.  6. Normal pericardium with no pericardial effusion.  *** No previous Echo exam.        Assessment and Plan:   · Assessment	  ASSESSMENT/PLAN: Left MCA stroke likely from atrial fib  New onset Afib   worsening brain edema and midline shift    NEURO:  L. MCA stroke, not a tPA or thrombectomy candidate.    Stroke work-up/core measures  Secondary Stroke prevention: ASA and statin   Delirium precautions  Worsening brain edema and midline shift in CTH, family refused surgical intervention   Na goal 145-155  EEG no seizure   Prn fentanyl for sedation  Activity: [] mobilize as tolerated [x] Bedrest [] PT [] OT [] PMNR    PULM:  Vent support  VAP bundle  continue on full support   CXR     CV:  SBP goal 120-180mmHg;   metoprolol tartrate 50 mg PO BID + amlodipine 5 daily +valsartan 160 PO BID.   Amiodarone gtt added  due to AF w RVR and insufficient control on metoprolol.    Lasix 10 given last night for pulm edema     RENAL:  Na goal 145-155  f/u BMP after Lasix   Trend Cr,   PT with UTI    GI:  Diet: On TF   GI prophylaxis [] not indicated [x] PPI: vented [] other:  Bowel regimen: LBM 2/14 [] colace [] senna [] other:    ENDO:   Goal euglycemia (-180), persistently hyperglycemic >200  HBA1c: 8.1 , LDL 70  start Insulin drip     HEME/ONC:  VTE prophylaxis: [s] SCDs [x] chemoprophylaxis - lovenox  [] hold chemoprophylaxis due to: [] high risk of DVT/PE on admission due to:    ID:  TMAX 100.8 (13 Feb 2020 20:00)  UTI r/o PNA   change ABx to Zosyn   Send procalcitonin     MISC:  Right wrist - x-ray 02/13/20, pending read-no fracture evident to my eye    SOCIAL/FAMILY:  [] awaiting [x] updated at bedside [] family meeting    CODE STATUS:  [x] Full Code [] DNR [] DNI [] Palliative/Comfort Care    DISPOSITION:  [x] ICU [] Stroke Unit [] Floor [] EMU [] RCU [] PCU    [x] Patient is at high risk of neurologic deterioration/death due to: seizure, intracerebral hemorrhage     Time spent: 45 [x] critical care minutes · Subjective and Objective:   SUMMARY:   78 y/o woman w/ h/o HTN, DM II and HLD initially presented to [facility] on 2/10/20 after being found unresponsive by family at 4 pm. LKN 9:30pm on 2/9/20. At baseline, she is independent and lives alone. At [facility] she was noted to be globally aphasic with RUE hemiplegia. CT head revealed dense L. MCA sign & acute L. MCA infarction. CTA revealed a L.M1 occlusion. She reportedly presented as a GCS 8 but declined to GCS 4, and was intubated for airway protection on 02/10.  Transferred to [facility] ED, remained intubated, admitted to NSCU service, originally boarded in MICU, being transferred to NSCU 02/14.  Was not a thrombectomy or tPA candidate.    2/11: was started on TF, ASA and DVT PPX  2/12: was started on CPAP trials , EEG no seizure   2/13: AF with RVR,  s/p IV BB and CCB with no response , started on amio gtt,   2/15: back in sinus, precedex d/c because of bradycardia     T(C): 36.8 (02-15-20 @ 11:00), Max: 37.8 (02-14-20 @ 19:00)  HR: 80 (02-15-20 @ 11:00) (52 - 145)  BP: 174/73 (02-15-20 @ 11:00) (116/58 - 185/70)  RR: 17 (02-15-20 @ 11:00) (15 - 25)  SpO2: 97% (02-15-20 @ 11:00) (96% - 100%)  02-14-20 @ 07:01  -  02-15-20 @ 07:00  --------------------------------------------------------  IN: 2783.7 mL / OUT: 1150 mL / NET: 1633.7 mL    02-15-20 @ 07:01  -  02-15-20 @ 11:59  --------------------------------------------------------  IN: 406 mL / OUT: 0 mL / NET: 406 mL    acetaminophen   Tablet .. 650 milliGRAM(s) Oral every 6 hours PRN  aMIOdarone    Tablet 200 milliGRAM(s) Oral daily  aspirin  chewable 81 milliGRAM(s) Oral daily  atorvastatin 80 milliGRAM(s) Oral at bedtime  chlorhexidine 0.12% Liquid 15 milliLiter(s) Oral Mucosa every 12 hours  chlorhexidine 4% Liquid 1 Application(s) Topical <User Schedule>  clocortolone 0.1% 1 Application(s) 1 Application(s) Topical two times a day  enoxaparin Injectable 40 milliGRAM(s) SubCutaneous <User Schedule>  fentaNYL    Injectable 25 MICROGram(s) IV Push every 2 hours PRN  insulin regular Infusion 2 Unit(s)/Hr IV Continuous <Continuous>  metoprolol tartrate 50 milliGRAM(s) Oral two times a day  oxyCODONE    IR 5 milliGRAM(s) Oral every 4 hours PRN  oxyCODONE    IR 10 milliGRAM(s) Oral every 4 hours PRN  pantoprazole  Injectable 40 milliGRAM(s) IV Push daily  piperacillin/tazobactam IVPB.. 3.375 Gram(s) IV Intermittent every 8 hours  polyethylene glycol 3350 17 Gram(s) Oral two times a day  senna 2 Tablet(s) Oral at bedtime  valsartan 160 milliGRAM(s) Oral two times a day  Mode: CPAP with PS, FiO2: 40, PEEP: 5, PS: 10, MAP: 8, PIP: 12    EXAMINATION:  General: No acute distress  HEENT: Anicteric sclerae  Cardiac: [id number]  Lungs: Clear  Abdomen: Soft, non-tender, +BS  Extremities: No c/c/e  Skin/Incision Site: Clean, dry and intact  Neurologic: Eyes open to noxious stimuli, no gaze deviation, no command following, PERR 3mm, +cough/gag, Left side spontaneously AG,  right side hemiplegic with triple flexion.    Right wrist with mild edema and small bruise      CAPILLARY BLOOD GLUCOSE  POCT Blood Glucose.: 209 mg/dL (14 Feb 2020 07:52)  POCT Blood Glucose.: 226 mg/dL (14 Feb 2020 00:11)  POCT Blood Glucose.: 237 mg/dL (13 Feb 2020 17:24)  POCT Blood Glucose.: 276 mg/dL (13 Feb 2020 11:36)    CT Brain Stroke Protocol (02.10.20 @ 16:58) There is a hyperdense appearing left middle cerebral artery. There is corresponding hypoattenuation within the left MCA territory within the left frontal lobe extending to the left insula. Hypoattenuation is also noted within the left anterior temporal lobe. Hypoattenuation extends into the superior left basal gangliaand corona radiata. There is no hemorrhagic transformation.    There is no hydrocephalus, shift of the midline structures, herniation, or abnormal intra-axial fluid collection.    Polyps versus retention cysts are noted within the right maxillary andleft sphenoid sinuses. Otherwise, the paranasal sinuses and mastoid air cells are clear. The calvarium appears intact. There is evidence of bilateral cataract removal.    IMPRESSION: Acute left MCA distribution infarction of L. frontal lobe extending to L.insula with an associated hyperdense left MCA sign. No hemorrhagic transformation.    CT Head No Cont (02.11.20 @ 10:38)  IMPRESSION:  Interval demarcation of a large left MCA territory infarct. No CT evidence for renea hemorrhagic transformation.  New mass effect upon the left lateral ventricle. New minimal rightward midline shift. No effacement of the basal cisterns. No hydrocephalus.    CT Angio Neck w/ IV Cont (02.10.20 @ 19:55)  There is persistent, somewhat more extensive cytotoxic edema surrounding the left insular cortex with persistent hyperdensity of the left M1 segment. There is no hemorrhage. There is mild mass effect with slight lateral ventricular effacement. Edema involves most of the lateral left basal ganglia. CTA confirms focal cut off of the left M1 segment approximately 8 mm distal to the origin. There is faint enhancement distal to this point implying either incomplete obstruction or collateralization of distal branches. There is normal appearance of the right MCA and both posterior cerebral arteries. There is a patent anterior communicating artery. The left posterior cerebral arteries primarily fed from the anterior circulation. There is a slightly diminutive left A1 segment.  Examination of the cervical circulation is negative for carotid or vertebral artery stenosis, occlusion or dissection.    IMPRESSION: Expanding zone of edema related to maturing infarct in the left MCA distribution as above    02/13/20 EEG Classification / Summary:  Abnormal EEG in the sedated state due to:  1) Rare left posterior quadrant ( max T7/P7) spike and wave discharges.   2) Continuous left hemispheric theta/ polymorphic delta present.  Left sided attenuation of fast activity  3) Moderate diffuse slowing.     Clinical Impression:  1) Potential epileptogenic focus in the left posterior quadrant region.  2) Structural abnormality in the left hemisphere involving grey and white matter.   3) Moderate diffuse or multifocal cerebral dysfunction.    No electrographic seizures seen.    TTE with Doppler (w/Cont) (02.12.20 @ 05:49) >  Conclusions:  1. Mitral annular calcification, otherwise normal mitral valve. Mild mitral regurgitation.  2. Calcified trileaflet aortic valve with normal opening. Minimal aortic regurgitation.  3. Normal left ventricular systolic function. No segmentalwall motion abnormalities. Endocardial visualization  enhanced with intravenous injection of Ultrasonic Enhancing Agent (Definity). No left ventricular thrombus.  4. Normal diastolic function  5. Right ventricular enlargement with normal right ventricular systolic function.  6. Normal pericardium with no pericardial effusion.  *** No previous Echo exam.      LABS:  Na: 145 (02-14 @ 22:59), 149 (02-14 @ 12:50), 152 (02-14 @ 04:10), 152 (02-13 @ 14:37), 148 (02-13 @ 06:47), 150 (02-13 @ 00:47), 144 (02-12 @ 17:17)  K: 4.9 (02-14 @ 22:59), 3.5 (02-14 @ 12:50), 3.4 (02-14 @ 04:10), 3.5 (02-13 @ 14:37), 3.7 (02-13 @ 06:47), 3.5 (02-13 @ 00:47), 3.6 (02-12 @ 17:17)  Cl: 111 (02-14 @ 22:59), 112 (02-14 @ 12:50), 119 (02-14 @ 04:10), 119 (02-13 @ 14:37), 116 (02-13 @ 06:47), 115 (02-13 @ 00:47), 112 (02-12 @ 17:17)  CO2: 24 (02-14 @ 22:59), 23 (02-14 @ 12:50), 22 (02-14 @ 04:10), 21 (02-13 @ 14:37), 22 (02-13 @ 06:47), 20 (02-13 @ 00:47), 22 (02-12 @ 17:17)  BUN: 28 (02-14 @ 22:59), 23 (02-14 @ 12:50), 26 (02-14 @ 04:10), 24 (02-13 @ 14:37), 23 (02-13 @ 06:47), 23 (02-13 @ 00:47), 22 (02-12 @ 17:17)  Cr: 0.67 (02-14 @ 22:59), 0.59 (02-14 @ 12:50), 0.54 (02-14 @ 04:10), 0.55 (02-13 @ 14:37), 0.53 (02-13 @ 06:47), 0.56 (02-13 @ 00:47), 0.61 (02-12 @ 17:17)  Glu: 172(02-14 @ 22:59), 285(02-14 @ 12:50), 266(02-14 @ 04:10), 286(02-13 @ 14:37), 298(02-13 @ 06:47), 278(02-13 @ 00:47), 224(02-12 @ 17:17)    Hgb: 11.2 (02-14 @ 04:10), 11.3 (02-13 @ 00:47)  Hct: 36.6 (02-14 @ 04:10), 35.6 (02-13 @ 00:47)  WBC: 13.65 (02-14 @ 04:10), 11.12 (02-13 @ 00:47)  Plt: 167 (02-14 @ 04:10), 149 (02-13 @ 00:47)    INR:   PTT:         ASSESSMENT/PLAN: Left MCA stroke likely from atrial fib  New onset Afib   worsening brain edema and midline shift    NEURO:  L. MCA stroke, not a tPA or thrombectomy candidate.    Stroke work-up/core measures  Secondary Stroke prevention: ASA and statin , she eventually needs anticoagulation in the setting of a.fib, but high risk of hemorrhagic transformation   Delirium precautions  Worsening brain edema and midline shift in CTH, family refused surgical intervention   Na goal 145-155  EEG no seizure   Prn fentanyl for sedation  Activity: [] mobilize as tolerated [x] Bedrest [] PT [] OT [] PMNR    PULM:  Vent support  VAP bundle  continue on full support     CV:  SBP goal 120-180mmHg;   metoprolol tartrate 50 mg PO BID + amlodipine 5 daily +valsartan 160 PO BID.   Amiodarone gtt changed to amiodarone oral  reverted back to sinus rythm     RENAL:  Na goal 145-155  Trend Cr,     GI:  Diet: On TF   GI prophylaxis [] not indicated [x] PPI: vented [] other:  Bowel regimen: LBM 2/14 [x] colace [x] senna [] other:    ENDO:   Goal euglycemia (-180), persistently hyperglycemic >200  HBA1c: 8.1 , LDL 70  Insulin drip   NPH5 unit q6 hrs and d/c insulin drip   HEME/ONC:  VTE prophylaxis: [s] SCDs [x] chemoprophylaxis - lovenox  [] hold chemoprophylaxis due to: [] high risk of DVT/PE on admission due to:    ID:  UTI and possible pneumonia on  zosyn  pending official cx     MISC:  Right wrist - x-ray 02/13/20, pending read-no fracture evident to my eye    SOCIAL/FAMILY:  [] awaiting [x] updated at bedside [] family meeting    CODE STATUS:  [x] Full Code [] DNR [] DNI [] Palliative/Comfort Care    DISPOSITION:  [x] ICU [] Stroke Unit [] Floor [] EMU [] RCU [] PCU    [x] Patient is at high risk of neurologic deterioration/death due to: seizure, intracerebral hemorrhage     Time spent: 45 [x] critical care minutes

## 2020-02-16 PROCEDURE — 71045 X-RAY EXAM CHEST 1 VIEW: CPT | Mod: 26

## 2020-02-16 PROCEDURE — 99233 SBSQ HOSP IP/OBS HIGH 50: CPT | Mod: GC

## 2020-02-16 RX ORDER — MORPHINE SULFATE 50 MG/1
5 CAPSULE, EXTENDED RELEASE ORAL ONCE
Refills: 0 | Status: DISCONTINUED | OUTPATIENT
Start: 2020-02-16 | End: 2020-02-16

## 2020-02-16 RX ORDER — ROBINUL 0.2 MG/ML
0.4 INJECTION INTRAMUSCULAR; INTRAVENOUS ONCE
Refills: 0 | Status: COMPLETED | OUTPATIENT
Start: 2020-02-16 | End: 2020-02-16

## 2020-02-16 RX ORDER — MORPHINE SULFATE 50 MG/1
2 CAPSULE, EXTENDED RELEASE ORAL
Refills: 0 | Status: DISCONTINUED | OUTPATIENT
Start: 2020-02-16 | End: 2020-02-17

## 2020-02-16 RX ORDER — ROBINUL 0.2 MG/ML
0.4 INJECTION INTRAMUSCULAR; INTRAVENOUS EVERY 6 HOURS
Refills: 0 | Status: DISCONTINUED | OUTPATIENT
Start: 2020-02-16 | End: 2020-02-29

## 2020-02-16 RX ADMIN — PIPERACILLIN AND TAZOBACTAM 25 GRAM(S): 4; .5 INJECTION, POWDER, LYOPHILIZED, FOR SOLUTION INTRAVENOUS at 05:04

## 2020-02-16 RX ADMIN — AMIODARONE HYDROCHLORIDE 200 MILLIGRAM(S): 400 TABLET ORAL at 05:04

## 2020-02-16 RX ADMIN — Medication 50 MILLIGRAM(S): at 05:04

## 2020-02-16 RX ADMIN — MORPHINE SULFATE 5 MILLIGRAM(S): 50 CAPSULE, EXTENDED RELEASE ORAL at 10:43

## 2020-02-16 RX ADMIN — MORPHINE SULFATE 5 MILLIGRAM(S): 50 CAPSULE, EXTENDED RELEASE ORAL at 11:32

## 2020-02-16 RX ADMIN — MORPHINE SULFATE 2 MILLIGRAM(S): 50 CAPSULE, EXTENDED RELEASE ORAL at 16:15

## 2020-02-16 RX ADMIN — VALSARTAN 160 MILLIGRAM(S): 80 TABLET ORAL at 05:04

## 2020-02-16 RX ADMIN — ROBINUL 0.4 MILLIGRAM(S): 0.2 INJECTION INTRAMUSCULAR; INTRAVENOUS at 10:42

## 2020-02-16 RX ADMIN — CHLORHEXIDINE GLUCONATE 15 MILLILITER(S): 213 SOLUTION TOPICAL at 05:04

## 2020-02-16 RX ADMIN — Medication 2 MILLIGRAM(S): at 10:43

## 2020-02-16 RX ADMIN — Medication 2 MILLIGRAM(S): at 11:32

## 2020-02-16 RX ADMIN — MORPHINE SULFATE 2 MILLIGRAM(S): 50 CAPSULE, EXTENDED RELEASE ORAL at 04:57

## 2020-02-16 NOTE — PROVIDER CONTACT NOTE (OTHER) - ASSESSMENT
d/c tube feed, FS, stroke scale neuro checks, BP parameters (transfer from NSCU), pt will be compassionately extubated tomorrow

## 2020-02-16 NOTE — PROGRESS NOTE ADULT - ASSESSMENT
78 year old female presented with unresponsiveness at home. At [place] she was found to have R hemiparesis and global aphasia, consistent with a L MCA syndrome. Her CT head reveals an acute L MCA infarction. 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 Not a candidate for IV tPA due to out of window.  Patient  transferred to the PCU for Compassionate Extubation.       S/P Compassionate Extubation today 2/16, Remains comfortable on Morphine 2 mg IVP q 1 hour for pain/ Dyspnea and Ativan 2 mg q 1 hour PRN for Anxiety/ Agitation glycopyrrolate 0.4 mg IVP q 6 hr PRN for oral secretions

## 2020-02-16 NOTE — AIRWAY REMOVAL NOTE  ADULT & PEDS - ARTIFICAL AIRWAY REMOVAL COMMENTS
Written order for extubation verified. The patient was identified by full name and birth date compared to the identification band.  Present during the procedure was RN.

## 2020-02-16 NOTE — PROGRESS NOTE ADULT - PROBLEM SELECTOR PLAN 1
Patient transferred from [facility] with acute CVA. Intubated for airway protection. Patient with right sided hemiparesis on exam. Intermittent response to commands, however, overall with poor neuro exam.   Patient was not a TPA or thrombectomy candidate.  Compassionate extubation today 2/16  Patient remains comfortable on Morphine 2 mg IVP q 1 hour for pain/ Dyspnea and Ativan 2 mg q 1 hour PRN for Anxiety/ Agitation glycopyrrolate 0.4 mg IVP q 6 hr PRN for oral secretions

## 2020-02-16 NOTE — PROGRESS NOTE ADULT - PROBLEM SELECTOR PLAN 5
Patient with devastating L MCA infarct, S/P palliative Extubation today 2/16  Family at bedside, all questions answered. Emotional support provided.  remains comfortable on Morphine 2 mg IVP q 1 hour for pain/ Dyspnea and Ativan 2 mg q 1 hour PRN for Anxiety/ Agitation and glycopyrrolate 0.4 mg IVP q 6 hr PRN for oral secretions

## 2020-02-16 NOTE — PROGRESS NOTE ADULT - PROBLEM SELECTOR PLAN 3
2/2 to CVA.   As per daughter Ana Luisa who is the HCP  mother had expressed wishes in the past of not wanting a life dependent on machines.   S/P Compassionate Extubation today 2/16

## 2020-02-16 NOTE — PROGRESS NOTE ADULT - SUBJECTIVE AND OBJECTIVE BOX
GAP TEAM PALLIATIVE CARE UNIT PROGRESS NOTE:      [  ] Patient on hospice program.    INDICATION FOR PALLIATIVE CARE UNIT SERVICES: (Transfer from  [facility] ) In the PCU for Compassionate Extubation in setting of  L MCA infarction    INTERVAL HPI/OVERNIGHT EVENTS: patient seen and examined at bedside, has some nonpurposeful leg movements.  Overnight patient received NO PRN's . Family at bedside. Compassionate extubation today patient was premedicated with morphine 5 mg IVP,  Ativan 1 mg and Robinul 0.4 mg once.  After Extubation patient receive Morphine 5 mg IVP and Ativan 2 mg for comfort.         DNR on chart: Yes    Allergies    No Known Allergies    Intolerances    MEDICATIONS  (STANDING):  clocortolone 0.1% 1 Application(s) 1 Application(s) Topical two times a day    MEDICATIONS  (PRN):  acetaminophen  Suppository .. 650 milliGRAM(s) Rectal every 6 hours PRN Temp greater or equal to 38C (100.4F)  bisacodyl Suppository 10 milliGRAM(s) Rectal daily PRN Constipation  glycopyrrolate Injectable 0.4 milliGRAM(s) IV Push every 6 hours PRN Secretions  LORazepam   Injectable 2 milliGRAM(s) IV Push every 1 hour PRN Agitation/ Agitation  morphine  - Injectable 2 milliGRAM(s) IV Push every 1 hour PRN Dyspnea  morphine  - Injectable 2 milliGRAM(s) IV Push every 1 hour PRN pain    ITEMS UNCHECKED ARE NOT PRESENT    PRESENT SYMPTOMS: [ x]Unable to obtain due to poor mentation   Source if other than patient:  [ ]Family   [ ]Team     Pain: [ ] yes [ ] no  QOL impact -   Location -                    Aggravating factors -  Quality -  Radiation -  Timing-  Severity (0-10 scale):  Minimal acceptable level (0-10 scale):     Dyspnea:                           [ ]Mild [ ]Moderate [ ]Severe  Anxiety:                             [ ]Mild [ ]Moderate [ ]Severe  Fatigue:                             [ ]Mild [ ]Moderate [ ]Severe  Nausea:                             [ ]Mild [ ]Moderate [ ]Severe  Loss of appetite:              [ ]Mild [ ]Moderate [ ]Severe  Constipation:                    [ ]Mild [ ]Moderate [ ]Severe    PAINAD Score: 0    [email or web address] (Ctrl +  left click to view)  		  Other Symptoms:  [ ]All other review of systems negative     Palliative Performance Status Version 2:        10 %         http://npcrc.org/files/news/palliative_performance_scale_ppsv2.pdf  PHYSICAL EXAM:  Vital Signs Last 24 Hrs  T(C): 36.7 (16 Feb 2020 07:31), Max: 37.6 (15 Feb 2020 15:00)  T(F): 98.1 (16 Feb 2020 07:31), Max: 99.7 (15 Feb 2020 15:00)  HR: 63 (16 Feb 2020 08:59) (63 - 140)  BP: 108/57 (16 Feb 2020 07:31) (108/57 - 156/73)  BP(mean): 101 (15 Feb 2020 18:00) (93 - 101)  RR: 14 (16 Feb 2020 07:31) (12 - 16)  SpO2: 96% (16 Feb 2020 08:59) (96% - 100%) I&O's Summary    15 Feb 2020 07:01  -  16 Feb 2020 07:00  --------------------------------------------------------  IN: 1132 mL / OUT: 850 mL / NET: 282 mL    GENERAL:  [ ]Alert  [ ]Oriented x   [x ]Lethargic  [ ]Cachexia  [ x]Unarousable  [ ]Verbal  [x ]Non-Verbal  Behavioral:   [ ] Anxiety  [ ] Delirium [ ] Agitation [ ] Other  HEENT:  [ ]Normal   [x ]Dry mouth   [x ]ET Tube/Trach   ( Compassionate extubation today  2/16) [ ]Oral lesions  PULMONARY:   [x ]Clear [ ]Tachypnea  [ ]Audible excessive secretions   [ ]Rhonchi        [ ]Right [ ]Left [ ]Bilateral  [ ]Crackles        [ ]Right [ ]Left [ ]Bilateral  [ ]Wheezing     [ ]Right [ ]Left [ ]Bilateral  [ ]Diminshed BS [ ]Right [ ]Left [ ]Bilateral    CARDIOVASCULAR:    [ ]Regular [x ]Irregular [ ]Tachy  [ ]Fan [ ]Murmur [ ]Other  GASTROINTESTINAL:  [x ]Soft  [ ]Distended   [x ]+BS  [ x]Non tender [ ]Tender  [ ]PEG [ ]OGT/ NGT   Last BM: 2/14    GENITOURINARY:  [ ]Normal [ ] Incontinent   [ ]Oliguria/Anuria   [x ]Michael  MUSCULOSKELETAL:   [ ]Normal   [ ]Weakness [x ] Paresis    [ x]Bed/Wheelchair bound [x ]Edema: Extremities B/L   NEUROLOGIC:   [ ]No focal deficits  [x ] Cognitive impairment  [ ] Dysphagia [ ]Dysarthria [ ] Paresis [ ]Other   SKIN: ecchymoses  [ ]Normal  [ ]Rash     [ ]Pressure ulcer(s)  [ ]y [ ]n  Present on admission        CRITICAL CARE:  [ ] Shock Present  [ ]Septic [ ]Cardiogenic [ ]Neurologic [ ]Hypovolemic  [ ]  Vasopressors [ ]  Inotropes   [x ] Respiratory failure present [ x] Mechanical Ventilation  ( Compassionate extubation today 2/16)     [ ] Non-invasive ventilatory support [ ] High-Flow  [ x] Acute  [ ] Chronic [ ] Hypoxic  [ ] Hypercarbic [ ] Other  [ ] Other organ failure       LABS:  02-14    145  |  111<H>  |  28<H>  ----------------------------<  172<H>  4.9   |  24  |  0.67    Ca    8.3<L>      14 Feb 2020 22:59  Phos  2.6     02-14  Mg     2.3     02-14          RADIOLOGY & ADDITIONAL STUDIES: None new    PROTEIN CALORIE MALNUTRITION: [ ] mild [x ] moderate [ ] severe  [ ] underweight [ ] morbid obesity    https://www.andeal.org/vault/2440/web/files/ONC/Table_Clinical%20Characteristics%20to%20Document%20Malnutrition-White%20JV%20et%20al%510873.pdf    Height (cm): 167.6 (02-11-20 @ 01:18), 160.02 (02-10-20 @ 16:50)  Weight (kg): 67.6 (02-11-20 @ 01:18), 71 (02-10-20 @ 16:50)  BMI (kg/m2): 24.1 (02-11-20 @ 01:18), 27.7 (02-10-20 @ 16:50)    [x ] PPSV2 < or = 30% [ ] significant weight loss [ ] poor nutritional intake [ ] anasarca Albumin, Serum: 4.1 g/dL (02-10-20 @ 17:46)    Artificial Nutrition [ ]     REFERRALS:   [ ]Chaplaincy  [ ] Hospice  [ ]Child Life  [x ]Social Work  [ ]Case management [ ]Holistic Therapy [ ] Physical Therapy [ ] Dietary   Goals of Care Document:

## 2020-02-17 DIAGNOSIS — G25.3 MYOCLONUS: ICD-10-CM

## 2020-02-17 LAB
CULTURE RESULTS: NO GROWTH — SIGNIFICANT CHANGE UP
CULTURE RESULTS: SIGNIFICANT CHANGE UP
CULTURE RESULTS: SIGNIFICANT CHANGE UP
SPECIMEN SOURCE: SIGNIFICANT CHANGE UP

## 2020-02-17 PROCEDURE — 99233 SBSQ HOSP IP/OBS HIGH 50: CPT

## 2020-02-17 RX ORDER — MORPHINE SULFATE 50 MG/1
4 CAPSULE, EXTENDED RELEASE ORAL ONCE
Refills: 0 | Status: DISCONTINUED | OUTPATIENT
Start: 2020-02-17 | End: 2020-02-17

## 2020-02-17 RX ORDER — MORPHINE SULFATE 50 MG/1
0.5 CAPSULE, EXTENDED RELEASE ORAL
Qty: 100 | Refills: 0 | Status: DISCONTINUED | OUTPATIENT
Start: 2020-02-17 | End: 2020-02-21

## 2020-02-17 RX ORDER — SODIUM CHLORIDE 9 MG/ML
1000 INJECTION INTRAMUSCULAR; INTRAVENOUS; SUBCUTANEOUS
Refills: 0 | Status: DISCONTINUED | OUTPATIENT
Start: 2020-02-17 | End: 2020-02-29

## 2020-02-17 RX ORDER — MORPHINE SULFATE 50 MG/1
2 CAPSULE, EXTENDED RELEASE ORAL EVERY 8 HOURS
Refills: 0 | Status: DISCONTINUED | OUTPATIENT
Start: 2020-02-17 | End: 2020-02-17

## 2020-02-17 RX ORDER — MORPHINE SULFATE 50 MG/1
4 CAPSULE, EXTENDED RELEASE ORAL
Refills: 0 | Status: DISCONTINUED | OUTPATIENT
Start: 2020-02-17 | End: 2020-02-21

## 2020-02-17 RX ADMIN — Medication 2 MILLIGRAM(S): at 09:34

## 2020-02-17 RX ADMIN — ROBINUL 0.4 MILLIGRAM(S): 0.2 INJECTION INTRAMUSCULAR; INTRAVENOUS at 15:41

## 2020-02-17 RX ADMIN — MORPHINE SULFATE 2 MILLIGRAM(S): 50 CAPSULE, EXTENDED RELEASE ORAL at 13:19

## 2020-02-17 RX ADMIN — MORPHINE SULFATE 4 MILLIGRAM(S): 50 CAPSULE, EXTENDED RELEASE ORAL at 15:42

## 2020-02-17 RX ADMIN — MORPHINE SULFATE 2 MILLIGRAM(S): 50 CAPSULE, EXTENDED RELEASE ORAL at 09:33

## 2020-02-17 RX ADMIN — MORPHINE SULFATE 4 MILLIGRAM(S): 50 CAPSULE, EXTENDED RELEASE ORAL at 10:35

## 2020-02-17 RX ADMIN — Medication 650 MILLIGRAM(S): at 20:00

## 2020-02-17 RX ADMIN — SODIUM CHLORIDE 10 MILLILITER(S): 9 INJECTION INTRAMUSCULAR; INTRAVENOUS; SUBCUTANEOUS at 19:34

## 2020-02-17 RX ADMIN — Medication 2 MILLIGRAM(S): at 03:01

## 2020-02-17 RX ADMIN — MORPHINE SULFATE 0.5 MG/HR: 50 CAPSULE, EXTENDED RELEASE ORAL at 19:34

## 2020-02-17 RX ADMIN — Medication 650 MILLIGRAM(S): at 18:34

## 2020-02-17 RX ADMIN — ROBINUL 0.4 MILLIGRAM(S): 0.2 INJECTION INTRAMUSCULAR; INTRAVENOUS at 09:30

## 2020-02-17 RX ADMIN — MORPHINE SULFATE 0.5 MG/HR: 50 CAPSULE, EXTENDED RELEASE ORAL at 16:52

## 2020-02-17 RX ADMIN — SODIUM CHLORIDE 10 MILLILITER(S): 9 INJECTION INTRAMUSCULAR; INTRAVENOUS; SUBCUTANEOUS at 17:36

## 2020-02-17 NOTE — PROGRESS NOTE ADULT - PROBLEM SELECTOR PLAN 4
PPSV 10 %. Requires Nursing assistance with all ADL's Received Ativan 2 mg IVP q 1 PRN  overnight for Myoclonus  Continue with Ativan 2 mg IVP q 1 hr PRN

## 2020-02-17 NOTE — PROGRESS NOTE ADULT - PROBLEM SELECTOR PLAN 1
Patient transferred from [facility] with acute CVA. Patient was not a TPA or thrombectomy candidate.  S/P compassionate extubation on 2/16 in the PCU.   Will increase Morphine to 4 mg IVP q 1 PRN for pain/ Dyspnea, and Add Morphine 2 mg IVP q 8 hours ATC. Continue with Ativan 2 mg IVP PRN q 1 for anxiety/ Agitation and Ativan 2 mg IVP q 1 PRN for Myoclonus and glycopyrrolate 0.4 mg IVP q 6 hr PRN for oral secretions

## 2020-02-17 NOTE — PROGRESS NOTE ADULT - ASSESSMENT
78 year old female presented with unresponsiveness at home. At [place] she was found to have R hemiparesis and global aphasia, consistent with a L MCA syndrome. Her CT head reveals an acute L MCA infarction. 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 Not a candidate for IV tPA due to out of window.  Patient  transferred to the PCU for Compassionate Extubation.       S/P Compassionate Extubation on 2/16 in the PCU.  Will increase Morphine to 4 mg IVP q 1 PRN for pain/ Dyspnea, and Add Morphine 2 mg IVP q 8 hours ATC. Continue with Ativan 2 mg IVP PRN q 1 for anxiety/ Agitation and Ativan 2 mg IVP q 1 PRN for Myoclonus

## 2020-02-17 NOTE — PROGRESS NOTE ADULT - PROBLEM SELECTOR PLAN 6
Patient with devastating L MCA infarct, S/P palliative Extubation today 2/16  Family at bedside, all questions answered. Emotional support provided.  Continue with Morphine to 4 mg IVP q 1 PRN for pain/ Dyspnea, and Add Morphine 2 mg IVP q 8 hours ATC. Continue with Ativan 2 mg IVP PRN q 1 for anxiety/ Agitation and Ativan 2 mg IVP q 1 PRN for Myoclonus and glycopyrrolate 0.4 mg IVP q 6 hr PRN for oral secretions.

## 2020-02-17 NOTE — PROGRESS NOTE ADULT - SUBJECTIVE AND OBJECTIVE BOX
GAP TEAM PALLIATIVE CARE UNIT PROGRESS NOTE:      [  ] Patient on hospice program.    INDICATION FOR PALLIATIVE CARE UNIT SERVICES:  (Transfer from  [facility] ) In the PCU for Compassionate Extubation in setting of  L MCA infarction    INTERVAL HPI/OVERNIGHT EVENTS: Patient received Ativan 2 mg IVP PRN x1 for myoclonus overnight, appears restless this morning, non purposeful movement of legs, has some oral secretion. S/P Compassionate extubation on 2/16.      Stat dose of Morphine 4 mg given. Will increase Morphine to 4 mg IVP q 1 PRN for pain/ Dyspnea, and  add Morphine 2 mg IVP q 8 hours ATC.       DNR on chart: Yes    Allergies    No Known Allergies    Intolerances    MEDICATIONS  (STANDING):  clocortolone 0.1% 1 Application(s) 1 Application(s) Topical two times a day  morphine  - Injectable 2 milliGRAM(s) IV Push every 8 hours  morphine  - Injectable 4 milliGRAM(s) IV Push once    MEDICATIONS  (PRN):  acetaminophen  Suppository .. 650 milliGRAM(s) Rectal every 6 hours PRN Temp greater or equal to 38C (100.4F)  bisacodyl Suppository 10 milliGRAM(s) Rectal daily PRN Constipation  glycopyrrolate Injectable 0.4 milliGRAM(s) IV Push every 6 hours PRN Secretions  LORazepam   Injectable 2 milliGRAM(s) IV Push every 1 hour PRN Myoclonus  LORazepam   Injectable 2 milliGRAM(s) IV Push every 1 hour PRN Agitation/ Agitation  morphine  - Injectable 4 milliGRAM(s) IV Push every 1 hour PRN Pain  morphine  - Injectable 4 milliGRAM(s) IV Push every 1 hour PRN Dyspnea    ITEMS UNCHECKED ARE NOT PRESENT    PRESENT SYMPTOMS: [ x]Unable to obtain due to poor mentation   Source if other than patient:  [ ]Family   [ ]Team     Pain: [ ] yes [ ] no  QOL impact -   Location -                    Aggravating factors -  Quality -  Radiation -  Timing-  Severity (0-10 scale):  Minimal acceptable level (0-10 scale):     Dyspnea:                           [ ]Mild [ ]Moderate [ ]Severe  Anxiety:                             [ ]Mild [ ]Moderate [ ]Severe  Fatigue:                             [ ]Mild [ ]Moderate [ ]Severe  Nausea:                             [ ]Mild [ ]Moderate [ ]Severe  Loss of appetite:              [ ]Mild [ ]Moderate [ ]Severe  Constipation:                    [ ]Mild [ ]Moderate [ ]Severe    PAINAD Score:  2:  Knee pulled up    [email or web address] (Ctrl +  left click to view)  		  Other Symptoms:  [ ]All other review of systems negative     Palliative Performance Status Version 2:        10 %         http://[facility].org/files/news/palliative_performance_scale_[email or web address].pdf  PHYSICAL EXAM:  Vital Signs Last 24 Hrs  T(C): 37.2 (17 Feb 2020 08:27), Max: 37.2 (17 Feb 2020 08:27)  T(F): 99 (17 Feb 2020 08:27), Max: 99 (17 Feb 2020 08:27)  HR: 68 (17 Feb 2020 08:27) (68 - 68)  BP: 153/72 (17 Feb 2020 08:27) (153/72 - 153/72)  BP(mean): --  RR: 22 (17 Feb 2020 08:27) (22 - 22)  SpO2: 98% (17 Feb 2020 08:27) (98% - 98%) I&O's Summary    16 Feb 2020 07:01  -  17 Feb 2020 07:00  --------------------------------------------------------  IN: 0 mL / OUT: 1575 mL / NET: -1575 mL    GENERAL:  [ ]Alert  [ ]Oriented x   [x ]Lethargic  [ ]Cachexia  [ ]Unarousable  [ ]Verbal  [x ]Non-Verbal  Behavioral:   [ ] Anxiety  [ ] Delirium [ ] Agitation [ ] Other  HEENT:  [ ]Normal   [ ]Dry mouth   [ ]ET Tube/Trach  [x ]Oral lesions  PULMONARY:   [ ]Clear [ ]Tachypnea  [ ]Audible excessive secretions   [x ]Rhonchi        [ ]Right [ ]Left [x ]Bilateral  [ ]Crackles        [ ]Right [ ]Left [ ]Bilateral  [ ]Wheezing     [ ]Right [ ]Left [ ]Bilateral  [ ]Diminshed BS [ ]Right [ ]Left [ ]Bilateral    CARDIOVASCULAR:    [ ]Regular [x ]Irregular [ ]Tachy  [ ]Fan [ ]Murmur [x ]Other s1 s2 heard  GASTROINTESTINAL:  [x ]Soft  [ ]Distended   [ x]+BS  [ ]Non tender [ ]Tender  [ ]PEG [ ]OGT/ NGT   Last BM:  2/15     GENITOURINARY:  [ ]Normal [ ] Incontinent   [ ]Oliguria/Anuria   [x ]Michael  MUSCULOSKELETAL:   [ ]Normal   [ ]Weakness  [x ] Paresis    [ x]Bed/Wheelchair bound [ ]Edema  NEUROLOGIC:   [ ]No focal deficits  [ x] Cognitive impairment  [ ] Dysphagia [ ]Dysarthria [ ] Paresis [ ]Other   SKIN:  ecchymoses  [ ]Normal  [ ]Rash     [ ]Pressure ulcer(s)  [ ]y [ ]n  Present on admission      CRITICAL CARE:  [ ] Shock Present  [ ]Septic [ ]Cardiogenic [ ]Neurologic [ ]Hypovolemic  [ ]  Vasopressors [ ]  Inotropes   [x ] Respiratory failure present [ ] Mechanical Ventilation [ ] Non-invasive ventilatory support [ ] High-Flow   [x] S/P Compassionate extubation 2/16  [ ] Acute  [ ] Chronic [ ] Hypoxic  [ ] Hypercarbic [ ] Other  [ ] Other organ failure     LABS: None new      RADIOLOGY & ADDITIONAL STUDIES: None new    PROTEIN CALORIE MALNUTRITION: [ ] mild [x ] moderate [ ] severe  [ ] underweight [ ] morbid obesity    https://www.andeal.org/vault/2440/web/files/ONC/Table_Clinical%20Characteristics%20to%20Document%20Malnutrition-White%20JV%20et%20al%681772.pdf    Height (cm): 167.6 (02-11-20 @ 01:18), 160.02 (02-10-20 @ 16:50)  Weight (kg): 67.6 (02-11-20 @ 01:18), 71 (02-10-20 @ 16:50)  BMI (kg/m2): 24.1 (02-11-20 @ 01:18), 27.7 (02-10-20 @ 16:50)    [x ] PPSV2 < or = 30% [ ] significant weight loss [ ] poor nutritional intake [ ] anasarca Albumin, Serum: 4.1 g/dL (02-10-20 @ 17:46)    Artificial Nutrition [ ]     REFERRALS:   [ ]Chaplaincy  [ ] Hospice  [ ]Child Life  [x ]Social Work  [ ]Case management [ ]Holistic Therapy [ ] Physical Therapy [ ] Dietary   Goals of Care Document:

## 2020-02-17 NOTE — PROGRESS NOTE ADULT - PROBLEM SELECTOR PLAN 5
Patient with devastating L MCA infarct, S/P palliative Extubation today 2/16  Family at bedside, all questions answered. Emotional support provided.  Continue with Morphine to 4 mg IVP q 1 PRN for pain/ Dyspnea, and Add Morphine 2 mg IVP q 8 hours ATC. Continue with Ativan 2 mg IVP PRN q 1 for anxiety/ Agitation and Ativan 2 mg IVP q 1 PRN for Myoclonus and glycopyrrolate 0.4 mg IVP q 6 hr PRN for oral secretions. PPSV 10 %. Requires Nursing assistance with all ADL's

## 2020-02-18 DIAGNOSIS — R45.1 RESTLESSNESS AND AGITATION: ICD-10-CM

## 2020-02-18 PROCEDURE — 99233 SBSQ HOSP IP/OBS HIGH 50: CPT | Mod: GC

## 2020-02-18 RX ADMIN — MORPHINE SULFATE 0.5 MG/HR: 50 CAPSULE, EXTENDED RELEASE ORAL at 19:42

## 2020-02-18 RX ADMIN — SODIUM CHLORIDE 10 MILLILITER(S): 9 INJECTION INTRAMUSCULAR; INTRAVENOUS; SUBCUTANEOUS at 04:11

## 2020-02-18 RX ADMIN — MORPHINE SULFATE 0.5 MG/HR: 50 CAPSULE, EXTENDED RELEASE ORAL at 07:16

## 2020-02-18 RX ADMIN — MORPHINE SULFATE 0.5 MG/HR: 50 CAPSULE, EXTENDED RELEASE ORAL at 12:06

## 2020-02-18 RX ADMIN — SODIUM CHLORIDE 10 MILLILITER(S): 9 INJECTION INTRAMUSCULAR; INTRAVENOUS; SUBCUTANEOUS at 19:42

## 2020-02-18 RX ADMIN — SODIUM CHLORIDE 10 MILLILITER(S): 9 INJECTION INTRAMUSCULAR; INTRAVENOUS; SUBCUTANEOUS at 12:06

## 2020-02-18 NOTE — PROGRESS NOTE ADULT - PROBLEM SELECTOR PLAN 2
2/2 to CVA. S/p compassionate extubation in the PCU 2/16/20/  As per daughter Laurie who is the surrogate (only daughter), her mother had expressed wishes in the past of not wanting a life dependent on machines.   Morphine drip at 0.5 mg in place s/p compassionate extubation. Morphine 4 mg IV in place q 1 prn. Morphine prn x 2 over the past 24 hours. Patient with intermittent snoring respirations, however, overall appears comfortable. 2/2 to CVA inability to control airway . S/p compassionate extubation in the PCU 2/16/20.  As per daughter Laurie who is the surrogate (only daughter), her mother had expressed wishes in the past of not wanting a life dependent on machines.   Morphine drip at 0.5 mg/hour. Morphine 4 mg IV in place q 1 prn. Morphine prn x 2 over the past 24 hours. Patient with intermittent snoring respirations.

## 2020-02-18 NOTE — PROGRESS NOTE ADULT - PROBLEM SELECTOR PLAN 6
Patient with devastating L MCA infarct, S/P palliative Extubation 2/16.  Daughter remains struggling with her decision, however, states her mother was very clear that she would never have wanted a life dependent on machines. States her mother was a strong woman who even disliked that she had to be drive to the grocery store and doctor's appointments. Her mother had a doctorate degree in chemistry and was a published . She was told the CVA likely would have been immediately fatal to most, however, her mother was strong woman who was even trying to move herself on the floor after she was found down from the CVA. Chaplaincy following and will assist in providing support. She is the surrogate and only child to the patient. Daughter has support from her  as well, however, remains feeling distressed over the process. Supportive care given and aware our team will remain available to her. Patient lives in [place] which is close to the [facility] and discussed this as an option, however, daughter does not feel ready to transition yet.     ENRICO present in the chart.

## 2020-02-18 NOTE — PROGRESS NOTE ADULT - ASSESSMENT
78 year old female presented with unresponsiveness at home. At [place] she was found to have R hemiparesis and global aphasia, consistent with a L MCA syndrome. Her CT head reveals an acute L MCA infarction. 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 Not a candidate for IV tPA due to out of window.  Patient  transferred to the PCU for Compassionate Extubation.     S/p compassionate extubation 2/16/20 in the PCU. Patient remains minimally to unresponsive with non-purposeful movements on the left side. Remains on Morphine drip at 0.5 mg/hour. Morphine prn x 2 in the past 24 hours, Ativan x 1, Robinul x 2 and Tylenol suppository x 1.

## 2020-02-18 NOTE — PROGRESS NOTE ADULT - SUBJECTIVE AND OBJECTIVE BOX
GAP TEAM PALLIATIVE CARE UNIT PROGRESS NOTE:      [  ] Patient on hospice program.    INDICATION FOR PALLIATIVE CARE UNIT SERVICES:  (Transfer from  [facility] ) In the PCU for Compassionate Extubation in setting of  L MCA infarction    INTERVAL HPI/OVERNIGHT EVENTS: S/p compassionate extubation 2/16/20. Morphine drip remains at 0.5 mg/hour. Morphine prn x 2, Robinul prn x 2, Ativan prn x 1 and Tylenol suppository x 1 in the past 24 hours. Facial expression appears comfortable and has some snoring respirations this morning on assessment.     DNR on chart: Yes    Allergies    No Known Allergies    Intolerances    MEDICATIONS  (STANDING):  clocortolone 0.1% 1 Application(s) 1 Application(s) Topical two times a day  morphine  Infusion 0.5 mG/Hr (0.5 mL/Hr) IV Continuous <Continuous>  sodium chloride 0.9%. 1000 milliLiter(s) (10 mL/Hr) IV Continuous <Continuous>    MEDICATIONS  (PRN):  acetaminophen  Suppository .. 650 milliGRAM(s) Rectal every 6 hours PRN Temp greater or equal to 38C (100.4F)  bisacodyl Suppository 10 milliGRAM(s) Rectal daily PRN Constipation  glycopyrrolate Injectable 0.4 milliGRAM(s) IV Push every 6 hours PRN Secretions  LORazepam   Injectable 2 milliGRAM(s) IV Push every 1 hour PRN Myoclonus  LORazepam   Injectable 2 milliGRAM(s) IV Push every 1 hour PRN Agitation/ Agitation  morphine  - Injectable 4 milliGRAM(s) IV Push every 1 hour PRN Pain  morphine  - Injectable 4 milliGRAM(s) IV Push every 1 hour PRN Dyspnea      ITEMS UNCHECKED ARE NOT PRESENT    PRESENT SYMPTOMS: [ x]Unable to obtain due to poor mentation   Source if other than patient:  [ ]Family   [ ]Team     Pain: [ ] yes [ ] no  QOL impact -   Location -                    Aggravating factors -  Quality -  Radiation -  Timing-  Severity (0-10 scale):  Minimal acceptable level (0-10 scale):     Dyspnea:                           [ ]Mild [ ]Moderate [ ]Severe  Anxiety:                             [ ]Mild [ ]Moderate [ ]Severe  Fatigue:                             [ ]Mild [ ]Moderate [ ]Severe  Nausea:                             [ ]Mild [ ]Moderate [ ]Severe  Loss of appetite:              [ ]Mild [ ]Moderate [ ]Severe  Constipation:                    [ ]Mild [ ]Moderate [ ]Severe    PAINAD Score:  2-4; movement of left side; snoring respirations    [email or web address] (Ctrl +  left click to view)  		  Other Symptoms:  [ ]All other review of systems negative     Palliative Performance Status Version 2:  10 %         [email or web address]/[email or web address]    PHYSICAL EXAM:  Vital Signs Last 24 Hrs  T(C): 36.8 (18 Feb 2020 08:37), Max: 37 (17 Feb 2020 20:00)  T(F): 98.3 (18 Feb 2020 08:37), Max: 98.6 (17 Feb 2020 20:00)  HR: 76 (18 Feb 2020 08:37) (76 - 76)  BP: 171/75 (18 Feb 2020 08:37) (171/75 - 171/75)  BP(mean): --  RR: 20 (18 Feb 2020 08:37) (20 - 20)  SpO2: 95% (18 Feb 2020 08:37) (95% - 95%)    16 Feb 2020 07:01  -  17 Feb 2020 07:00  --------------------------------------------------------  IN: 0 mL / OUT: 1575 mL / NET: -1575 mL    GENERAL:  [ ]Alert  [ ]Oriented x   [x ]Lethargic  [ ]Cachexia  [ ]Unarousable  [ ]Verbal  [x ]Non-Verbal  Behavioral:   [ ] Anxiety  [ ] Delirium [x ] Agitation (intermittent) [ ] Other  HEENT:  [ ]Normal   [ ]Dry mouth   [ ]ET Tube/Trach  [x ]Oral lesions  PULMONARY:   [ ]Clear [ ]Tachypnea  [ ]Audible excessive secretions   [x ]Rhonchi        [ ]Right [ ]Left [x ]Bilateral  [ ]Crackles        [ ]Right [ ]Left [ ]Bilateral  [ ]Wheezing     [ ]Right [ ]Left [ ]Bilateral  [ ]Diminshed BS [ ]Right [ ]Left [ ]Bilateral    CARDIOVASCULAR:    [ ]Regular [x ]Irregular [ ]Tachy  [ ]Fan [ ]Murmur [x ]Other S1/S2 audible  GASTROINTESTINAL:  [x ]Soft  [ ]Distended   [ x]+BS  [ ]Non tender [ ]Tender  [ ]PEG [ ]OGT/ NGT   Last BM:  2/15/20   GENITOURINARY:  [ ]Normal [ ] Incontinent   [ ]Oliguria/Anuria   [x ]Michael  MUSCULOSKELETAL:   [ ]Normal   [ ]Weakness [ x]Bed/Wheelchair bound [ x]Edema-right upper and lower extremities  NEUROLOGIC:   [ ]No focal deficits  [ x] Cognitive impairment  [ ] Dysphagia [ ]Dysarthria [x ] Paresis-right side [ ]Other   SKIN:  ecchymoses  [ ]Normal  [ ]Rash     [ ]Pressure ulcer(s)  [ ]y [ ]n  Present on admission      CRITICAL CARE:  [ ] Shock Present  [ ]Septic [ ]Cardiogenic [ ]Neurologic [ ]Hypovolemic  [ ]  Vasopressors [ ]  Inotropes   [x ] Respiratory failure present [ ] Mechanical Ventilation [ ] Non-invasive ventilatory support [ ] High-Flow   [x] S/P Compassionate extubation 2/16  [ ] Acute  [ ] Chronic [ ] Hypoxic  [ ] Hypercarbic [ ] Other  [ ] Other organ failure     LABS: None new      RADIOLOGY & ADDITIONAL STUDIES: None new    PROTEIN CALORIE MALNUTRITION: [ ] mild [x ] moderate [ ] severe  [ ] underweight [ ] morbid obesity    https://www.andeal.org/vault/2440/web/files/ONC/Table_Clinical%20Characteristics%20to%20Document%20Malnutrition-White%20JV%20et%20al%376606.pdf    Height (cm): 167.6 (02-11-20 @ 01:18), 160.02 (02-10-20 @ 16:50)  Weight (kg): 67.6 (02-11-20 @ 01:18), 71 (02-10-20 @ 16:50)  BMI (kg/m2): 24.1 (02-11-20 @ 01:18), 27.7 (02-10-20 @ 16:50)    [x ] PPSV2 < or = 30% [ ] significant weight loss [ ] poor nutritional intake [ ] anasarca Albumin, Serum: 4.1 g/dL (02-10-20 @ 17:46)    Artificial Nutrition [ ]     REFERRALS:   [ ]Chaplaincy  [ ] Hospice  [ ]Child Life  [x ]Social Work  [ ]Case management [ ]Holistic Therapy [ ] Physical Therapy [ ] Dietary   Goals of Care Document: GAP TEAM PALLIATIVE CARE UNIT PROGRESS NOTE:      [  ] Patient on hospice program.    INDICATION FOR PALLIATIVE CARE UNIT SERVICES:  (Transfer from  [facility] ) In the PCU for compassionate extubation in setting of  L MCA infarction    INTERVAL HPI/OVERNIGHT EVENTS: S/p compassionate extubation 2/16/20. Morphine drip remains at 0.5 mg/hour. Morphine prn x 2, Robinul prn x 2, Ativan prn x 1 and Tylenol suppository x 1 in the past 24 hours. Facial expression appears comfortable and has some snoring respirations this morning on assessment.     DNR on chart: Yes    Allergies    No Known Allergies    Intolerances    MEDICATIONS  (STANDING):  clocortolone 0.1% 1 Application(s) 1 Application(s) Topical two times a day  morphine  Infusion 0.5 mG/Hr (0.5 mL/Hr) IV Continuous <Continuous>  sodium chloride 0.9%. 1000 milliLiter(s) (10 mL/Hr) IV Continuous <Continuous>    MEDICATIONS  (PRN):  acetaminophen  Suppository .. 650 milliGRAM(s) Rectal every 6 hours PRN Temp greater or equal to 38C (100.4F)  bisacodyl Suppository 10 milliGRAM(s) Rectal daily PRN Constipation  glycopyrrolate Injectable 0.4 milliGRAM(s) IV Push every 6 hours PRN Secretions  LORazepam   Injectable 2 milliGRAM(s) IV Push every 1 hour PRN Myoclonus  LORazepam   Injectable 2 milliGRAM(s) IV Push every 1 hour PRN Agitation/ Agitation  morphine  - Injectable 4 milliGRAM(s) IV Push every 1 hour PRN Pain  morphine  - Injectable 4 milliGRAM(s) IV Push every 1 hour PRN Dyspnea      ITEMS UNCHECKED ARE NOT PRESENT    PRESENT SYMPTOMS: [ x]Unable to obtain verbally due to poor mentation from stroke  Source if other than patient:  [ ]Family   [ ]Team     Pain: [ ] yes [ ] no intermittently based on painads  QOL impact -   Location -                    Aggravating factors -  Quality -  Radiation -  Timing-  Severity (0-10 scale):  Minimal acceptable level (0-10 scale):     Dyspnea:                           [ ]Mild [ ]Moderate [ ]Severe  Anxiety:                             [ ]Mild [ ]Moderate [ ]Severe  Fatigue:                             [ ]Mild [ ]Moderate [ ]Severe  Nausea:                             [ ]Mild [ ]Moderate [ ]Severe  Loss of appetite:              [ ]Mild [ ]Moderate [ ]Severe  Constipation:                    [ ]Mild [ ]Moderate [ ]Severe    PAINAD Score:  2-4; movement of left side; snoring respirations    [email or web address] (Ctrl +  left click to view)  		  Other Symptoms:  [ ]All other review of systems negative     Palliative Performance Status Version 2:  10 %         [email or web address]/[email or web address]_performance_[email or web address]_[email or web address]    PHYSICAL EXAM:  Vital Signs Last 24 Hrs  T(C): 36.8 (18 Feb 2020 08:37), Max: 37 (17 Feb 2020 20:00)  T(F): 98.3 (18 Feb 2020 08:37), Max: 98.6 (17 Feb 2020 20:00)  HR: 76 (18 Feb 2020 08:37) (76 - 76)  BP: 171/75 (18 Feb 2020 08:37) (171/75 - 171/75)  BP(mean): --  RR: 20 (18 Feb 2020 08:37) (20 - 20)  SpO2: 95% (18 Feb 2020 08:37) (95% - 95%)    16 Feb 2020 07:01  -  17 Feb 2020 07:00  --------------------------------------------------------  IN: 0 mL / OUT: 1575 mL / NET: -1575 mL    GENERAL:  [ ]Alert  [ ]Oriented x   [x ]Lethargic  [ ]Cachexia  [ ]Unarousable  [ ]Verbal  [x ]Non-Verbal  Behavioral:   [ ] Anxiety  [ ] Delirium [x ] Agitation (intermittent) [ ] Other  HEENT:  [x ]Normal   [ ]Dry mouth   [ ]ET Tube/Trach  [x ]Oral lesions  PULMONARY:   [ ]Clear [ ]Tachypnea  [ ]Audible excessive secretions   [x ]Rhonchi        [ ]Right [ ]Left [x ]Bilateral  [ ]Crackles        [ ]Right [ ]Left [ ]Bilateral  [ ]Wheezing     [ ]Right [ ]Left [ ]Bilateral  [ ]Diminshed BS [ ]Right [ ]Left [ ]Bilateral    CARDIOVASCULAR:    [ ]Regular [x ]Irregular [ ]Tachy  [ ]Fan [ ]Murmur [x ]Other S1/S2 audible  GASTROINTESTINAL:  [x ]Soft  [ ]Distended   [ x]+BS  [ ]Non tender [ ]Tender  [ ]PEG [ ]OGT/ NGT   Last BM:  2/15/20   GENITOURINARY:  [ ]Normal [x ] Incontinent   [ ]Oliguria/Anuria   [x ]Michael  MUSCULOSKELETAL:   [ ]Normal   [ ]Weakness [ x]Bed/Wheelchair bound [ x]Edema-right upper and lower extremities  NEUROLOGIC:   [ ]No focal deficits  [ x] Cognitive impairment  [ ] Dysphagia [ ]Dysarthria [x ] Paresis-right side [ ]Other   SKIN:  ecchymoses  [x ]Normal  [ ]Rash     [ ]Pressure ulcer(s)  [ ]y [ ]n  Present on admission      CRITICAL CARE:  [ ] Shock Present  [ ]Septic [ ]Cardiogenic [ ]Neurologic [ ]Hypovolemic  [ ]  Vasopressors [ ]  Inotropes   [x ] Respiratory failure present [ ] Mechanical Ventilation [ ] Non-invasive ventilatory support [ ] High-Flow   [x] S/P Compassionate extubation 2/16  [ ] Acute  [ ] Chronic [ ] Hypoxic  [ ] Hypercarbic [ ] Other  [x ] Other organ failure  brain    LABS: None new      RADIOLOGY & ADDITIONAL STUDIES: None new    PROTEIN CALORIE MALNUTRITION: [ ] mild [x ] moderate [ ] severe  [ ] underweight [ ] morbid obesity    https://www.andeal.org/vault/2440/web/files/ONC/Table_Clinical%20Characteristics%20to%20Document%20Malnutrition-White%20JV%20et%20al%134147.pdf    Height (cm): 167.6 (02-11-20 @ 01:18), 160.02 (02-10-20 @ 16:50)  Weight (kg): 67.6 (02-11-20 @ 01:18), 71 (02-10-20 @ 16:50)  BMI (kg/m2): 24.1 (02-11-20 @ 01:18), 27.7 (02-10-20 @ 16:50)    [x ] PPSV2 < or = 30% [ ] significant weight loss [ x] poor nutritional intake [ ] anasarca Albumin, Serum: 4.1 g/dL (02-10-20 @ 17:46)    Artificial Nutrition [ ]     REFERRALS:   [ ]Chaplaincy  [ ] Hospice  [ ]Child Life  [x ]Social Work  [ ]Case management [ ]Holistic Therapy [ ] Physical Therapy [ ] Dietary   Goals of Care Document:

## 2020-02-18 NOTE — PROGRESS NOTE ADULT - PROBLEM SELECTOR PLAN 1
Patient transferred from [facility] with acute CVA. Patient was not a TPA or thrombectomy candidate.  S/P compassionate extubation on 2/16 in the PCU. Patient with movement on the left side only that appears to be mostly non-purposeful. Right sided paresis. Poor neurological exam. Patient transferred from [facility] with acute CVA. Patient was not a TPA or thrombectomy candidate. Patient with movement on the left side only that appears to be mostly non-purposeful. Right sided paresis. Poor neurological exam.

## 2020-02-18 NOTE — PROGRESS NOTE ADULT - PROBLEM SELECTOR PLAN 3
Likely 2/2 to CVA. Ativan in place prn. Ativan given x 1 in the past 24 hours. Patient with intermittent flailing of left arm, however, appears to be non-purposeful. C/w Ativan 2 mg IV prn severe agitation.

## 2020-02-18 NOTE — PROGRESS NOTE ADULT - ATTENDING COMMENTS
I have personally seen and examined this patient and agree with the above assessment and plan, which I have reviewed and edited where appropriate.     GOC: Management of symptoms in the setting of acute left MCA CVA.  S/p extubation  Symptoms: agitation, dyspnea, myoclonus  Disposition: Hospice evaluation for inpatient.    Met with daughter at bedside,  educated as to what to expect.  Questions answered.  Emotional support provided.  Daughter distraught over situation as her mother was a highly functional, educated  who valued her independence.

## 2020-02-19 PROCEDURE — 99232 SBSQ HOSP IP/OBS MODERATE 35: CPT | Mod: GC

## 2020-02-19 RX ORDER — HYDRALAZINE HCL 50 MG
5 TABLET ORAL ONCE
Refills: 0 | Status: COMPLETED | OUTPATIENT
Start: 2020-02-19 | End: 2020-02-19

## 2020-02-19 RX ADMIN — SODIUM CHLORIDE 10 MILLILITER(S): 9 INJECTION INTRAMUSCULAR; INTRAVENOUS; SUBCUTANEOUS at 19:45

## 2020-02-19 RX ADMIN — MORPHINE SULFATE 0.5 MG/HR: 50 CAPSULE, EXTENDED RELEASE ORAL at 18:20

## 2020-02-19 RX ADMIN — Medication 2 MILLIGRAM(S): at 13:08

## 2020-02-19 RX ADMIN — MORPHINE SULFATE 4 MILLIGRAM(S): 50 CAPSULE, EXTENDED RELEASE ORAL at 18:40

## 2020-02-19 RX ADMIN — Medication 5 MILLIGRAM(S): at 08:58

## 2020-02-19 RX ADMIN — MORPHINE SULFATE 0.5 MG/HR: 50 CAPSULE, EXTENDED RELEASE ORAL at 19:45

## 2020-02-19 RX ADMIN — Medication 2 MILLIGRAM(S): at 23:57

## 2020-02-19 RX ADMIN — MORPHINE SULFATE 0.5 MG/HR: 50 CAPSULE, EXTENDED RELEASE ORAL at 12:46

## 2020-02-19 RX ADMIN — MORPHINE SULFATE 0.5 MG/HR: 50 CAPSULE, EXTENDED RELEASE ORAL at 18:08

## 2020-02-19 RX ADMIN — MORPHINE SULFATE 0.5 MG/HR: 50 CAPSULE, EXTENDED RELEASE ORAL at 07:00

## 2020-02-19 RX ADMIN — Medication 2 MILLIGRAM(S): at 08:59

## 2020-02-19 RX ADMIN — MORPHINE SULFATE 4 MILLIGRAM(S): 50 CAPSULE, EXTENDED RELEASE ORAL at 18:30

## 2020-02-19 NOTE — PROGRESS NOTE ADULT - PROBLEM SELECTOR PLAN 3
Likely 2/2 to CVA. Ativan in place prn. Ativan given x 1 this morning for agitation. Daughter agreeable to administration to keep patient calm as she was very agitated in the NSCU. Likely 2/2 to delirium. Ativan in place prn. Ativan given x 1 this morning for agitation. Daughter agreeable to administration to keep patient calm as she was very agitated in the NSCU.

## 2020-02-19 NOTE — PROGRESS NOTE ADULT - PROBLEM SELECTOR PLAN 6
Patient with devastating L MCA infarct, S/P palliative Extubation 2/16.  Daughter remains struggling with her decision, however, states her mother was very clear that she would never have wanted a life dependent on machines. States her mother was a strong woman who even disliked that she had to be drive to the grocery store and doctor's appointments. Her mother had a doctorate degree in chemistry and was a published . She was told the CVA likely would have been immediately fatal to most, however, her mother was strong woman who was even trying to move herself on the floor after she was found down from the CVA. Chaplaincy following and will assist in providing support. She is the surrogate and only child to the patient. Daughter has support from her  as well, however, remains feeling distressed over the process. Supportive care given and aware our team will remain available to her. Patient lives in [place] which is close to the [facility] and discussed this as an option, however, daughter does not feel ready to transition yet.    ENRICO present in the chart. Patient with devastating L MCA infarct, S/P palliative Extubation 2/16.  Daughter at bedside daily.  Ongoing support provided.

## 2020-02-19 NOTE — PROGRESS NOTE ADULT - ATTENDING COMMENTS
I have personally seen and examined this patient and agree with the above assessment and plan, which I have reviewed and edited where appropriate.     GOC: Management of distressing symptoms at the end of life.  Symptoms: Dyspnea/delirium  Disposition: Hospice inpatient evaluation.  No oral route with intermittent agitation requiring IV medication.  Dyspnea managed with continuous morphine infusion.      Daughter  educated as to what to expect.  Questions answered.  Emotional support provided.

## 2020-02-19 NOTE — PROVIDER CONTACT NOTE (OTHER) - SITUATION
pt admitted with unresponsiveness and right sided weakness. extubated on 2/16 . sent to PCU. PMH of HTN. pt has bp of 188/77 with HR of 81. pt nonverbal.

## 2020-02-19 NOTE — PROGRESS NOTE ADULT - PROBLEM SELECTOR PLAN 2
2/2 to CVA inability to control airway . S/p compassionate extubation in the PCU 2/16/20.  As per daughter Laurie who is the surrogate (only daughter), her mother had expressed wishes in the past of not wanting a life dependent on machines.     Morphine gtt remains @ 0.5 mg/hour. No prn use of Morphine in the past 24 hours. Breathing unlabored and patient comfortable on assessment this morning. S/p compassionate extubation in the PCU 2/16/20.  Dyspnea controlled with   morphine gtt @ 0.5 mg/hour. No prn use of Morphine in the past 24 hours. Breathing unlabored and patient comfortable on assessment this morning.

## 2020-02-19 NOTE — PROGRESS NOTE ADULT - SUBJECTIVE AND OBJECTIVE BOX
GAP TEAM PALLIATIVE CARE UNIT PROGRESS NOTE:      [  ] Patient on hospice program.    INDICATION FOR PALLIATIVE CARE UNIT SERVICES:  (Transfer from  [facility] ) In the PCU for compassionate extubation in setting of  L MCA infarction    INTERVAL HPI/OVERNIGHT EVENTS: S/p compassionate extubation 2/16/20. Morphine drip remains at 0.5 mg/hour. Ativan prn x 1 this morning for agitation. Patient appears comfortable on assessment. Daughter at bedside.     DNR on chart: Yes    Allergies    No Known Allergies    Intolerances    MEDICATIONS  (STANDING):  clocortolone 0.1% 1 Application(s) 1 Application(s) Topical two times a day  morphine  Infusion 0.5 mG/Hr (0.5 mL/Hr) IV Continuous <Continuous>  sodium chloride 0.9%. 1000 milliLiter(s) (10 mL/Hr) IV Continuous <Continuous>    MEDICATIONS  (PRN):  acetaminophen  Suppository .. 650 milliGRAM(s) Rectal every 6 hours PRN Temp greater or equal to 38C (100.4F)  bisacodyl Suppository 10 milliGRAM(s) Rectal daily PRN Constipation  glycopyrrolate Injectable 0.4 milliGRAM(s) IV Push every 6 hours PRN Secretions  LORazepam   Injectable 2 milliGRAM(s) IV Push every 1 hour PRN Myoclonus  LORazepam   Injectable 2 milliGRAM(s) IV Push every 1 hour PRN Agitation/ Agitation  morphine  - Injectable 4 milliGRAM(s) IV Push every 1 hour PRN Pain  morphine  - Injectable 4 milliGRAM(s) IV Push every 1 hour PRN Dyspnea    ITEMS UNCHECKED ARE NOT PRESENT    PRESENT SYMPTOMS: [ x]Unable to obtain verbally due to poor mentation from stroke  Source if other than patient:  [ ]Family   [ ]Team     Pain: [ ] yes [ ] no intermittently based on painads  QOL impact -   Location -                    Aggravating factors -  Quality -  Radiation -  Timing-  Severity (0-10 scale):  Minimal acceptable level (0-10 scale):     Dyspnea:                           [ ]Mild [ ]Moderate [ ]Severe  Anxiety:                             [ ]Mild [ ]Moderate [ ]Severe  Fatigue:                             [ ]Mild [ ]Moderate [ ]Severe  Nausea:                             [ ]Mild [ ]Moderate [ ]Severe  Loss of appetite:              [ ]Mild [ ]Moderate [ ]Severe  Constipation:                    [ ]Mild [ ]Moderate [ ]Severe    PAINAD Score:  0    [email or web address]/painad.pdf (Ctrl +  left click to view)  		  Other Symptoms:  [ ]All other review of systems negative     Palliative Performance Status Version 2:  10 %         http://npcrc.org/files/news/palliative_performance_scale_ppsv2.pdf    PHYSICAL EXAM:  Vital Signs Last 24 Hrs  T(C): 36.8 (19 Feb 2020 08:13), Max: 36.8 (19 Feb 2020 08:13)  T(F): 98.2 (19 Feb 2020 08:13), Max: 98.2 (19 Feb 2020 08:13)  HR: 81 (19 Feb 2020 08:13) (81 - 81)  BP: 188/77 (19 Feb 2020 08:13) (188/77 - 188/77)  BP(mean): --  RR: 18 (19 Feb 2020 08:13) (18 - 18)  SpO2: 94% (19 Feb 2020 08:13) (94% - 94%)    16 Feb 2020 07:01  -  17 Feb 2020 07:00  --------------------------------------------------------  IN: 0 mL / OUT: 1575 mL / NET: -1575 mL    GENERAL:  [ ]Alert  [ ]Oriented x   [x ]Lethargic  [ ]Cachexia  [ ]Unarousable  [ ]Verbal  [x ]Non-Verbal  Behavioral:   [ ] Anxiety  [ ] Delirium [x ] Agitation (intermittent) [ ] Other  HEENT:  [x ]Normal   [ ]Dry mouth   [ ]ET Tube/Trach  [ ]Oral lesions  PULMONARY:   [ ]Clear [ ]Tachypnea  [ ]Audible excessive secretions   [x ]Rhonchi        [ ]Right [ ]Left [x ]Bilateral  [ ]Crackles        [ ]Right [ ]Left [ ]Bilateral  [ ]Wheezing     [ ]Right [ ]Left [ ]Bilateral  [ ]Diminshed BS [ ]Right [ ]Left [ ]Bilateral    CARDIOVASCULAR:    [ ]Regular [x ]Irregular [ ]Tachy  [ ]Fan [ ]Murmur [x ]Other S1/S2 audible  GASTROINTESTINAL:  [x ]Soft  [ ]Distended   [ x]+BS  [ ]Non tender [ ]Tender  [ ]PEG [ ]OGT/ NGT   Last BM:  2/15/20   GENITOURINARY:  [ ]Normal [x ] Incontinent   [ ]Oliguria/Anuria   [x ]Michael  MUSCULOSKELETAL:   [ ]Normal   [ ]Weakness [ x]Bed/Wheelchair bound [ x]Edema-right upper and lower extremities  NEUROLOGIC:   [ ]No focal deficits  [ x] Cognitive impairment  [ x Dysphagia [ ]Dysarthria [x ] Paresis-right side [ ]Other   SKIN:  ecchymoses  [x ]Normal  [ ]Rash     [ ]Pressure ulcer(s)  [ ]y [ ]n  Present on admission      CRITICAL CARE:  [ ] Shock Present  [ ]Septic [ ]Cardiogenic [ ]Neurologic [ ]Hypovolemic  [ ]  Vasopressors [ ]  Inotropes   [x ] Respiratory failure present [ ] Mechanical Ventilation [ ] Non-invasive ventilatory support [ ] High-Flow   [x] S/P Compassionate extubation 2/16  [ ] Acute  [ ] Chronic [ ] Hypoxic  [ ] Hypercarbic [ ] Other  [x ] Other organ failure  brain    LABS: None new      RADIOLOGY & ADDITIONAL STUDIES: None new    PROTEIN CALORIE MALNUTRITION: [ ] mild [x ] moderate [ ] severe  [ ] underweight [ ] morbid obesity    https://www.andeal.org/vault/2440/web/files/ONC/Table_Clinical%20Characteristics%20to%20Document%20Malnutrition-White%20JV%20et%20al%812399.pdf    Height (cm): 167.6 (02-11-20 @ 01:18), 160.02 (02-10-20 @ 16:50)  Weight (kg): 67.6 (02-11-20 @ 01:18), 71 (02-10-20 @ 16:50)  BMI (kg/m2): 24.1 (02-11-20 @ 01:18), 27.7 (02-10-20 @ 16:50)    [x ] PPSV2 < or = 30% [ ] significant weight loss [ x] poor nutritional intake [ ] anasarca Albumin, Serum: 4.1 g/dL (02-10-20 @ 17:46)    Artificial Nutrition [ ]     REFERRALS:   [ ]Chaplaincy  [ ] Hospice  [ ]Child Life  [x ]Social Work  [ ]Case management [ ]Holistic Therapy [ ] Physical Therapy [ ] Dietary   Goals of Care Document: GAP TEAM PALLIATIVE CARE UNIT PROGRESS NOTE:      [  ] Patient on hospice program.    INDICATION FOR PALLIATIVE CARE UNIT SERVICES:  (Transfer from  [facility] ) In the PCU for compassionate extubation in setting of  L MCA infarction    INTERVAL HPI/OVERNIGHT EVENTS: S/p compassionate extubation 2/16/20. Morphine drip remains at 0.5 mg/hour. Ativan prn x 1 this morning for agitation. Patient appears comfortable on assessment. Daughter at bedside.     DNR on chart: Yes    Allergies    No Known Allergies    Intolerances    MEDICATIONS  (STANDING):  clocortolone 0.1% 1 Application(s) 1 Application(s) Topical two times a day  morphine  Infusion 0.5 mG/Hr (0.5 mL/Hr) IV Continuous <Continuous>  sodium chloride 0.9%. 1000 milliLiter(s) (10 mL/Hr) IV Continuous <Continuous>    MEDICATIONS  (PRN):  acetaminophen  Suppository .. 650 milliGRAM(s) Rectal every 6 hours PRN Temp greater or equal to 38C (100.4F)  bisacodyl Suppository 10 milliGRAM(s) Rectal daily PRN Constipation  glycopyrrolate Injectable 0.4 milliGRAM(s) IV Push every 6 hours PRN Secretions  LORazepam   Injectable 2 milliGRAM(s) IV Push every 1 hour PRN Myoclonus  LORazepam   Injectable 2 milliGRAM(s) IV Push every 1 hour PRN Agitation/ Agitation  morphine  - Injectable 4 milliGRAM(s) IV Push every 1 hour PRN Pain  morphine  - Injectable 4 milliGRAM(s) IV Push every 1 hour PRN Dyspnea    ITEMS UNCHECKED ARE NOT PRESENT    PRESENT SYMPTOMS: [ x]Unable to obtain verbally due to poor mentation from stroke  Source if other than patient:  [ ]Family   [ ]Team     Pain: [ ] yes [x ] no   QOL impact -   Location -                    Aggravating factors -  Quality -  Radiation -  Timing-  Severity (0-10 scale):  Minimal acceptable level (0-10 scale):     Dyspnea:                           [ ]Mild [ ]Moderate [ ]Severe  Anxiety:                             [ ]Mild [ ]Moderate [ ]Severe  Fatigue:                             [ ]Mild [ ]Moderate [ ]Severe  Nausea:                             [ ]Mild [ ]Moderate [ ]Severe  Loss of appetite:              [ ]Mild [ ]Moderate [ ]Severe  Constipation:                    [ ]Mild [ ]Moderate [ ]Severe    PAINAD Score:  0    http://geriatrictoolkit.[employer].edu/cog/painad.pdf (Ctrl +  left click to view)  		  Other Symptoms:  [ ]All other review of systems negative     Palliative Performance Status Version 2:  10 %         http://[facility].org/files/news/palliative_performance_scale_[email or web address].pdf    PHYSICAL EXAM:  Vital Signs Last 24 Hrs  T(C): 36.8 (19 Feb 2020 08:13), Max: 36.8 (19 Feb 2020 08:13)  T(F): 98.2 (19 Feb 2020 08:13), Max: 98.2 (19 Feb 2020 08:13)  HR: 81 (19 Feb 2020 08:13) (81 - 81)  BP: 188/77 (19 Feb 2020 08:13) (188/77 - 188/77)  BP(mean): --  RR: 18 (19 Feb 2020 08:13) (18 - 18)  SpO2: 94% (19 Feb 2020 08:13) (94% - 94%)    16 Feb 2020 07:01  -  17 Feb 2020 07:00  --------------------------------------------------------  IN: 0 mL / OUT: 1575 mL / NET: -1575 mL    GENERAL:  [ ]Alert  [ ]Oriented x   [x ]Lethargic  [ ]Cachexia  [ ]Unarousable  [ ]Verbal  [x ]Non-Verbal  Behavioral:   [ ] Anxiety  [ ] Delirium [x ] Agitation (intermittent) [ ] Other  HEENT:  [x ]Normal   [ ]Dry mouth   [ ]ET Tube/Trach  [ ]Oral lesions  PULMONARY:   [ ]Clear [ ]Tachypnea  [ ]Audible excessive secretions   [x ]Rhonchi        [ ]Right [ ]Left [x ]Bilateral  [ ]Crackles        [ ]Right [ ]Left [ ]Bilateral  [ ]Wheezing     [ ]Right [ ]Left [ ]Bilateral  [ ]Diminished BS [ ]Right [ ]Left [ ]Bilateral    CARDIOVASCULAR:    [x ]Regular [x ]Irregular [ ]Tachy  [ ]Fan [ ]Murmur [x ]Other S1/S2 audible  GASTROINTESTINAL:  [x ]Soft  [ ]Distended   [ x]+BS  [x ]Non tender [ ]Tender  [ ]PEG [ ]OGT/ NGT   Last BM:  2/15/20   GENITOURINARY:  [ ]Normal [x ] Incontinent   [ ]Oliguria/Anuria   [x ]Michael  MUSCULOSKELETAL:   [ ]Normal   [ ]Weakness [ x]Bed/Wheelchair bound [ x]Edema-right upper and lower extremities  NEUROLOGIC:   [ ]No focal deficits  [ x] Cognitive impairment  [ x Dysphagia [ ]Dysarthria [x ] Paresis-right side [ ]Other   SKIN:  ecchymoses  [x ]Normal  [ ]Rash     [ ]Pressure ulcer(s)  [ ]y [ ]n  Present on admission      CRITICAL CARE:  [ ] Shock Present  [ ]Septic [ ]Cardiogenic [ ]Neurologic [ ]Hypovolemic  [ ]  Vasopressors [ ]  Inotropes   [x ] Respiratory failure present [ ] Mechanical Ventilation [ ] Non-invasive ventilatory support [ ] High-Flow   [x] S/P Compassionate extubation 2/16  [ ] Acute  [ ] Chronic [ ] Hypoxic  [ ] Hypercarbic [ ] Other  [x ] Other organ failure  brain    LABS: None new      RADIOLOGY & ADDITIONAL STUDIES: None new    PROTEIN CALORIE MALNUTRITION: [ ] mild [x ] moderate [ ] severe  [ ] underweight [ ] morbid obesity    https://www.andeal.org/vault/2440/web/files/ONC/Table_Clinical%20Characteristics%20to%20Document%20Malnutrition-White%20JV%20et%20al%337836.pdf    Height (cm): 167.6 (02-11-20 @ 01:18), 160.02 (02-10-20 @ 16:50)  Weight (kg): 67.6 (02-11-20 @ 01:18), 71 (02-10-20 @ 16:50)  BMI (kg/m2): 24.1 (02-11-20 @ 01:18), 27.7 (02-10-20 @ 16:50)    [x ] PPSV2 < or = 30% [ ] significant weight loss [ x] poor nutritional intake [ ] anasarca Albumin, Serum: 4.1 g/dL (02-10-20 @ 17:46)    Artificial Nutrition [ ]     REFERRALS:   [ ]Chaplaincy  [ ] Hospice  [ ]Child Life  [x ]Social Work  [ ]Case management [ ]Holistic Therapy [ ] Physical Therapy [ ] Dietary   Goals of Care Document:

## 2020-02-19 NOTE — PROGRESS NOTE ADULT - ASSESSMENT
78 year old female presented with unresponsiveness at home. At [place] she was found to have R hemiparesis and global aphasia, consistent with a L MCA syndrome. Her CT head reveals an acute L MCA infarction. 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 Not a candidate for IV tPA due to out of window.  Patient  transferred to the PCU for Compassionate Extubation.     S/p compassionate extubation 2/16/20 in the PCU. Patient remains minimally to unresponsive with non-purposeful movements on the left side. Remains on Morphine drip at 0.5 mg/hour. Ativan prn x 1 this morning for agitation.

## 2020-02-19 NOTE — PROGRESS NOTE ADULT - PROBLEM SELECTOR PLAN 1
Patient transferred from [facility] with acute CVA. Patient was not a TPA or thrombectomy candidate. Patient with movement on the left side only that appears to be mostly non-purposeful. Right sided paresis. Poor neurological exam.

## 2020-02-19 NOTE — CHART NOTE - NSCHARTNOTEFT_GEN_A_CORE
Per discussion with palliative care team pt is not appropriate for nutrition follow up, S/P compassionate extubation 2/16, tube feeding discontinued 2/15, RD to remain available as needed.    Kayli Gonzalez R.D., [facility], Pager #[phone]

## 2020-02-20 RX ADMIN — MORPHINE SULFATE 4 MILLIGRAM(S): 50 CAPSULE, EXTENDED RELEASE ORAL at 16:33

## 2020-02-20 RX ADMIN — SODIUM CHLORIDE 10 MILLILITER(S): 9 INJECTION INTRAMUSCULAR; INTRAVENOUS; SUBCUTANEOUS at 08:03

## 2020-02-20 RX ADMIN — MORPHINE SULFATE 4 MILLIGRAM(S): 50 CAPSULE, EXTENDED RELEASE ORAL at 08:01

## 2020-02-20 RX ADMIN — Medication 1 MILLIGRAM(S): at 12:57

## 2020-02-20 RX ADMIN — MORPHINE SULFATE 4 MILLIGRAM(S): 50 CAPSULE, EXTENDED RELEASE ORAL at 12:57

## 2020-02-20 RX ADMIN — Medication 2 MILLIGRAM(S): at 16:33

## 2020-02-20 RX ADMIN — MORPHINE SULFATE 0.5 MG/HR: 50 CAPSULE, EXTENDED RELEASE ORAL at 08:03

## 2020-02-20 RX ADMIN — Medication 1 MILLIGRAM(S): at 23:34

## 2020-02-20 RX ADMIN — MORPHINE SULFATE 0.5 MG/HR: 50 CAPSULE, EXTENDED RELEASE ORAL at 19:31

## 2020-02-20 RX ADMIN — SODIUM CHLORIDE 10 MILLILITER(S): 9 INJECTION INTRAMUSCULAR; INTRAVENOUS; SUBCUTANEOUS at 19:32

## 2020-02-20 RX ADMIN — Medication 2 MILLIGRAM(S): at 08:00

## 2020-02-20 RX ADMIN — Medication 1 MILLIGRAM(S): at 18:03

## 2020-02-20 NOTE — PROGRESS NOTE ADULT - PROBLEM SELECTOR PLAN 1
Patient transferred from [facility] with acute CVA. Patient was not a TPA or thrombectomy candidate. Patient with movement on the left side only that appears to be mostly non-purposeful. Right sided paresis. Poor neurological exam. Acute left MCA cva, outside of the window for thrombolytic therapy.  Exam poor with no hope for meaningful recovery. Acute left MCA cva, outside of the window for thrombolytic therapy. Exam remains poor with no hope for meaningful recovery.

## 2020-02-20 NOTE — PROGRESS NOTE ADULT - ATTENDING COMMENTS
I have personally seen and examined this patient and agree with the above assessment and plan, which I have reviewed and edited where appropriate.    GOC: Management of distressing symptoms at the end of life.  Symptoms: Dyspnea/delirium  Disposition:   No oral route with intermittent agitation requiring IV medication.  Dyspnea managed with continuous morphine infusion, no changes.      Daughter provided with support.  Questions answered. I have personally seen and examined this patient and agree with the above assessment and plan, which I have reviewed and edited where appropriate.    GOC: Management of distressing symptoms at the end of life.  Symptoms: Dyspnea/delirium  Disposition:   No oral route with intermittent agitation requiring IV medication.  Dyspnea managed with continuous morphine infusion, no changes.      Daughter provided with support.  Questions answered..

## 2020-02-20 NOTE — PROGRESS NOTE ADULT - PROBLEM SELECTOR PLAN 5
PPSV 10 %. Bedbound and requires nursing assistance with all ADL's Patient with devastating L MCA infarct, S/P palliative Extubation 2/16.  Daughter remains struggling with her decision, however, states her mother was very clear that she would never have wanted a life dependent on machines. States her mother was a strong woman who even disliked that she had to be drive to the grocery store and doctor's appointments. Her mother had a doctorate degree in chemistry and was a published . She was told the CVA likely would have been immediately fatal to most, however, her mother was strong woman who was even trying to move herself on the floor after she was found down from the CVA. Chaplaincy following and will assist in providing support. She is the surrogate and only child to the patient. Daughter has support from her  as well, however, remains feeling distressed over the process. Supportive care given and aware our team will remain available to her. Patient lives in [place] which is close to [facility] and discussed this as an option, however, daughter does not feel ready to transition yet.    ENRICO present in the chart. Patient with devastating L MCA infarct, S/P palliative Extubation 2/16.  Met with daughter, GILBERTO Wiggins and myself this morning. Supportive care given to daughter as she remains struggling with the acute nature of her mother's CVA and now loss of independence. She described her mother as a very strong woman who walked 45-50 minutes daily back and forth to her home and had a love for her dog even though she was not an animal lover. She knows she is doing the right thing for her mother, however, is struggling. Patient's 31 year old granddaughter was very close to the patient and she showed us pictures of their travels together.     Discussed again the possible transition to hospice and what it could provide for the patient and family. She would like to visit [facility] tomorrow or Saturday, but seemed to feel it would likely be a good transition given the close location to her home. Offered to speak with the patient's granddaughter to give her support as well and she will reach out to her and let us know.    ENRICO present in chart.      ENRICO present in the chart.

## 2020-02-20 NOTE — PROGRESS NOTE ADULT - PROBLEM SELECTOR PLAN 4
Intermittent. Not visualized on assessment this morning. Ativan 2 mg in place for myoclonus prn. PPSV 10 %. Bedbound and requires nursing assistance with all ADL's

## 2020-02-20 NOTE — PROGRESS NOTE ADULT - ASSESSMENT
78 year old female presented with unresponsiveness at home. At [place] she was found to have R hemiparesis and global aphasia, consistent with a L MCA syndrome. Her CT head reveals an acute L MCA infarction. 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 Not a candidate for IV tPA due to out of window.  Patient  transferred to the PCU for Compassionate Extubation.     S/p compassionate extubation 2/16/20 in the PCU. Patient remains minimally to unresponsive with non-purposeful movements on the left side. 78 year old female presented with unresponsiveness at home. At [place] she was found to have R hemiparesis and global aphasia, consistent with a L MCA syndrome. Her CT head reveals an acute L MCA infarction. 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 Not a candidate for IV tPA due to out of window.  Patient was transferred to the PCU for compassionate extubation which took place on  2/16/20 in the PCU. Patient remains minimally to unresponsive with non-purposeful movements on the left side. 78 year old female presented with unresponsiveness at home. At [place] she was found to have R hemiparesis and global aphasia, consistent with a L MCA syndrome. Her CT head reveals an acute L MCA infarction. 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 Not a candidate for IV tPA due to out of window.  Patient was transferred to the PCU for compassionate extubation which took place on  2/16/20 in the PCU. Patient remains minimally to unresponsive with non-purposeful movements on the left side.     Ativan prn x 3 in the past 24 hours. Ativan 1 mg ordered standing q 6 hours. Remains with intermittent flailing of left arm. Appears more relaxed on assessment s/p Ativan administration. Morphine drip remains in place @ 0.5 mg/hour. PRN Morphine x 1 in the past 24 hours.

## 2020-02-20 NOTE — PROGRESS NOTE ADULT - PROBLEM SELECTOR PLAN 3
Likely 2/2 to CVA. Ativan in place prn. Ativan given x 1 this morning for agitation. Daughter agreeable to administration to keep patient calm as she was very agitated in the NSCU. Secondary to delirium in the setting of acute stroke. Ativan in place prn. Ativan given x 1 this morning for agitation. Secondary to delirium in the setting of acute stroke. Ativan prn x 3 in the past 24 hours. Ativan 1 mg ordered IV q 6 ATC for better management of agitation. Daughter agreeable to plan and wants her mother comfortable.

## 2020-02-20 NOTE — PROGRESS NOTE ADULT - PROBLEM SELECTOR PLAN 6
Patient with devastating L MCA infarct, S/P palliative Extubation 2/16.  Daughter remains struggling with her decision, however, states her mother was very clear that she would never have wanted a life dependent on machines. States her mother was a strong woman who even disliked that she had to be drive to the grocery store and doctor's appointments. Her mother had a doctorate degree in chemistry and was a published . She was told the CVA likely would have been immediately fatal to most, however, her mother was strong woman who was even trying to move herself on the floor after she was found down from the CVA. Chaplaincy following and will assist in providing support. She is the surrogate and only child to the patient. Daughter has support from her  as well, however, remains feeling distressed over the process. Supportive care given and aware our team will remain available to her. Patient lives in [place] which is close to the [facility] and discussed this as an option, however, daughter does not feel ready to transition yet.    ENRICO present in the chart.

## 2020-02-20 NOTE — PROGRESS NOTE ADULT - SUBJECTIVE AND OBJECTIVE BOX
GAP TEAM PALLIATIVE CARE UNIT PROGRESS NOTE:      [  ] Patient on hospice program.    INDICATION FOR PALLIATIVE CARE UNIT SERVICES:  (Transfer from  [facility] ) In the PCU for compassionate extubation in setting of  L MCA infarction    INTERVAL HPI/OVERNIGHT EVENTS:     DNR on chart: Yes    Allergies    No Known Allergies    Intolerances    MEDICATIONS  (STANDING):  clocortolone 0.1% 1 Application(s) 1 Application(s) Topical two times a day  LORazepam   Injectable 1 milliGRAM(s) IV Push every 6 hours  morphine  Infusion 0.5 mG/Hr (0.5 mL/Hr) IV Continuous <Continuous>  sodium chloride 0.9%. 1000 milliLiter(s) (10 mL/Hr) IV Continuous <Continuous>    MEDICATIONS  (PRN):  acetaminophen  Suppository .. 650 milliGRAM(s) Rectal every 6 hours PRN Temp greater or equal to 38C (100.4F)  bisacodyl Suppository 10 milliGRAM(s) Rectal daily PRN Constipation  glycopyrrolate Injectable 0.4 milliGRAM(s) IV Push every 6 hours PRN Secretions  LORazepam   Injectable 2 milliGRAM(s) IV Push every 1 hour PRN Agitation/ Agitation  LORazepam   Injectable 2 milliGRAM(s) IV Push every 1 hour PRN Myoclonus  morphine  - Injectable 4 milliGRAM(s) IV Push every 1 hour PRN Pain  morphine  - Injectable 4 milliGRAM(s) IV Push every 1 hour PRN Dyspnea      ITEMS UNCHECKED ARE NOT PRESENT    PRESENT SYMPTOMS: [ x]Unable to obtain verbally due to poor mentation from stroke  Source if other than patient:  [ ]Family   [ ]Team     Pain: [ ] yes [ ] no intermittently based on painads  QOL impact -   Location -                    Aggravating factors -  Quality -  Radiation -  Timing-  Severity (0-10 scale):  Minimal acceptable level (0-10 scale):     Dyspnea:                           [ ]Mild [ ]Moderate [ ]Severe  Anxiety:                             [ ]Mild [ ]Moderate [ ]Severe  Fatigue:                             [ ]Mild [ ]Moderate [ ]Severe  Nausea:                             [ ]Mild [ ]Moderate [ ]Severe  Loss of appetite:              [ ]Mild [ ]Moderate [ ]Severe  Constipation:                    [ ]Mild [ ]Moderate [ ]Severe    PAINAD Score:  0    [email or web address] (Ctrl +  left click to view)  		  Other Symptoms:  [ ]All other review of systems negative     Palliative Performance Status Version 2:  10 %         [email or web address]    PHYSICAL EXAM:  Vital Signs Last 24 Hrs  T(C): 37 (20 Feb 2020 08:17), Max: 37 (20 Feb 2020 08:17)  T(F): 98.6 (20 Feb 2020 08:17), Max: 98.6 (20 Feb 2020 08:17)  HR: 87 (20 Feb 2020 08:17) (87 - 87)  BP: 155/73 (20 Feb 2020 08:17) (155/73 - 155/73)  BP(mean): --  RR: 18 (20 Feb 2020 08:17) (18 - 18)  SpO2: 95% (20 Feb 2020 08:17) (95% - 95%)    16 Feb 2020 07:01  -  17 Feb 2020 07:00  --------------------------------------------------------  IN: 0 mL / OUT: 1575 mL / NET: -1575 mL    GENERAL:  [ ]Alert  [ ]Oriented x   [x ]Lethargic  [ ]Cachexia  [ ]Unarousable  [ ]Verbal  [x ]Non-Verbal  Behavioral:   [ ] Anxiety  [ ] Delirium [x ] Agitation (intermittent) [ ] Other  HEENT:  [x ]Normal   [ ]Dry mouth   [ ]ET Tube/Trach  [ ]Oral lesions  PULMONARY:   [ ]Clear [ ]Tachypnea  [ ]Audible excessive secretions   [x ]Rhonchi        [ ]Right [ ]Left [x ]Bilateral  [ ]Crackles        [ ]Right [ ]Left [ ]Bilateral  [ ]Wheezing     [ ]Right [ ]Left [ ]Bilateral  [ ]Diminshed BS [ ]Right [ ]Left [ ]Bilateral    CARDIOVASCULAR:    [ ]Regular [x ]Irregular [ ]Tachy  [ ]Fan [ ]Murmur [x ]Other S1/S2 audible  GASTROINTESTINAL:  [x ]Soft  [ ]Distended   [ x]+BS  [ ]Non tender [ ]Tender  [ ]PEG [ ]OGT/ NGT   Last BM:  2/15/20   GENITOURINARY:  [ ]Normal [x ] Incontinent   [ ]Oliguria/Anuria   [x ]Michael  MUSCULOSKELETAL:   [ ]Normal   [ ]Weakness [ x]Bed/Wheelchair bound [ x]Edema-right upper and lower extremities  NEUROLOGIC:   [ ]No focal deficits  [ x] Cognitive impairment  [ x Dysphagia [ ]Dysarthria [x ] Paresis-right side [ ]Other   SKIN:  ecchymoses  [x ]Normal  [ ]Rash     [ ]Pressure ulcer(s)  [ ]y [ ]n  Present on admission      CRITICAL CARE:  [ ] Shock Present  [ ]Septic [ ]Cardiogenic [ ]Neurologic [ ]Hypovolemic  [ ]  Vasopressors [ ]  Inotropes   [x ] Respiratory failure present [ ] Mechanical Ventilation [ ] Non-invasive ventilatory support [ ] High-Flow   [x] S/P Compassionate extubation 2/16  [ ] Acute  [ ] Chronic [ ] Hypoxic  [ ] Hypercarbic [ ] Other  [x ] Other organ failure  brain    LABS: None new      RADIOLOGY & ADDITIONAL STUDIES: None new    PROTEIN CALORIE MALNUTRITION: [ ] mild [x ] moderate [ ] severe  [ ] underweight [ ] morbid obesity    https://www.andeal.org/vault/2440/web/files/ONC/Table_Clinical%20Characteristics%20to%20Document%20Malnutrition-White%20JV%20et%20al%037143.pdf    Height (cm): 167.6 (02-11-20 @ 01:18), 160.02 (02-10-20 @ 16:50)  Weight (kg): 67.6 (02-11-20 @ 01:18), 71 (02-10-20 @ 16:50)  BMI (kg/m2): 24.1 (02-11-20 @ 01:18), 27.7 (02-10-20 @ 16:50)    [x ] PPSV2 < or = 30% [ ] significant weight loss [ x] poor nutritional intake [ ] anasarca Albumin, Serum: 4.1 g/dL (02-10-20 @ 17:46)    Artificial Nutrition [ ]     REFERRALS:   [ ]Chaplaincy  [ ] Hospice  [ ]Child Life  [x ]Social Work  [ ]Case management [ ]Holistic Therapy [ ] Physical Therapy [ ] Dietary   Goals of Care Document: GAP TEAM PALLIATIVE CARE UNIT PROGRESS NOTE:      [  ] Patient on hospice program.    INDICATION FOR PALLIATIVE CARE UNIT SERVICES:  (Transfer from  [facility] ) In the PCU for compassionate extubation in setting of  L MCA infarction    INTERVAL HPI/OVERNIGHT EVENTS:     DNR on chart: Yes    Allergies    No Known Allergies    Intolerances    MEDICATIONS  (STANDING):  clocortolone 0.1% 1 Application(s) 1 Application(s) Topical two times a day  LORazepam   Injectable 1 milliGRAM(s) IV Push every 6 hours  morphine  Infusion 0.5 mG/Hr (0.5 mL/Hr) IV Continuous <Continuous>  sodium chloride 0.9%. 1000 milliLiter(s) (10 mL/Hr) IV Continuous <Continuous>    MEDICATIONS  (PRN):  acetaminophen  Suppository .. 650 milliGRAM(s) Rectal every 6 hours PRN Temp greater or equal to 38C (100.4F)  bisacodyl Suppository 10 milliGRAM(s) Rectal daily PRN Constipation  glycopyrrolate Injectable 0.4 milliGRAM(s) IV Push every 6 hours PRN Secretions  LORazepam   Injectable 2 milliGRAM(s) IV Push every 1 hour PRN Agitation/ Agitation  LORazepam   Injectable 2 milliGRAM(s) IV Push every 1 hour PRN Myoclonus  morphine  - Injectable 4 milliGRAM(s) IV Push every 1 hour PRN Pain  morphine  - Injectable 4 milliGRAM(s) IV Push every 1 hour PRN Dyspnea      ITEMS UNCHECKED ARE NOT PRESENT    PRESENT SYMPTOMS: [ x]Unable to obtain verbally due to poor mentation from stroke  Source if other than patient:  [ ]Family   [ ]Team     Pain: [ ] yes [ ] no intermittently based on painads  QOL impact -   Location -                    Aggravating factors -  Quality -  Radiation -  Timing-  Severity (0-10 scale):  Minimal acceptable level (0-10 scale):     Dyspnea:                           [ ]Mild [ ]Moderate [ ]Severe  Anxiety:                             [ ]Mild [ ]Moderate [ ]Severe  Fatigue:                             [ ]Mild [ ]Moderate [ ]Severe  Nausea:                             [ ]Mild [ ]Moderate [ ]Severe  Loss of appetite:              [ ]Mild [ ]Moderate [ ]Severe  Constipation:                    [ ]Mild [ ]Moderate [ ]Severe    PAINAD Score:  0    [email or web address] (Ctrl +  left click to view)  		  Other Symptoms:  [ ]All other review of systems negative     Palliative Performance Status Version 2:  10 %         [email or web address]    PHYSICAL EXAM:  Vital Signs Last 24 Hrs  T(C): 37 (20 Feb 2020 08:17), Max: 37 (20 Feb 2020 08:17)  T(F): 98.6 (20 Feb 2020 08:17), Max: 98.6 (20 Feb 2020 08:17)  HR: 87 (20 Feb 2020 08:17) (87 - 87)  BP: 155/73 (20 Feb 2020 08:17) (155/73 - 155/73)  BP(mean): --  RR: 18 (20 Feb 2020 08:17) (18 - 18)  SpO2: 95% (20 Feb 2020 08:17) (95% - 95%)    16 Feb 2020 07:01  -  17 Feb 2020 07:00  --------------------------------------------------------  IN: 0 mL / OUT: 1575 mL / NET: -1575 mL    GENERAL:  [ ]Alert  [ ]Oriented x   [x ]Lethargic  [ ]Cachexia  [ ]Unarousable  [ ]Verbal  [x ]Non-Verbal  Behavioral:   [ ] Anxiety  [ ] Delirium [x ] Agitation (intermittent) [ ] Other  HEENT:  [x ]Normal   [ ]Dry mouth   [ ]ET Tube/Trach  [ ]Oral lesions  PULMONARY:   [ ]Clear [ ]Tachypnea  [ ]Audible excessive secretions   [x ]Rhonchi        [ ]Right [ ]Left [x ]Bilateral  [ ]Crackles        [ ]Right [ ]Left [ ]Bilateral  [ ]Wheezing     [ ]Right [ ]Left [ ]Bilateral  [ ]Diminshed BS [ ]Right [ ]Left [ ]Bilateral    CARDIOVASCULAR:    [ ]Regular [x ]Irregular [ ]Tachy  [ ]Fan [ ]Murmur [x ]Other S1/S2 audible  GASTROINTESTINAL:  [x ]Soft  [ ]Distended   [ x]+BS  [x ]Non tender [ ]Tender  [ ]PEG [ ]OGT/ NGT   Last BM:  2/15/20   GENITOURINARY:  [ ]Normal [x ] Incontinent   [ ]Oliguria/Anuria   [x ]Michael  MUSCULOSKELETAL:   [ ]Normal   [ ]Weakness [ x]Bed/Wheelchair bound [ x]Edema-right upper and lower extremities  NEUROLOGIC:   [ ]No focal deficits  [ x] Cognitive impairment  [ x Dysphagia [ ]Dysarthria [x ] Paresis-right side [ ]Other   SKIN:  ecchymoses  [x ]Normal  [ ]Rash     [ ]Pressure ulcer(s)  [ ]y [ ]n  Present on admission      CRITICAL CARE:  [ ] Shock Present  [ ]Septic [ ]Cardiogenic [ ]Neurologic [ ]Hypovolemic  [ ]  Vasopressors [ ]  Inotropes   [x ] Respiratory failure present [ ] Mechanical Ventilation [ ] Non-invasive ventilatory support [ ] High-Flow   [x] S/P Compassionate extubation 2/16  [ ] Acute  [ ] Chronic [ ] Hypoxic  [ ] Hypercarbic [ ] Other  [x ] Other organ failure  brain    LABS: None new      RADIOLOGY & ADDITIONAL STUDIES: None new    PROTEIN CALORIE MALNUTRITION: [ ] mild [x ] moderate [ ] severe  [ ] underweight [ ] morbid obesity    https://www.andeal.org/vault/2440/web/files/ONC/Table_Clinical%20Characteristics%20to%20Document%20Malnutrition-White%20JV%20et%20al%021106.pdf    Height (cm): 167.6 (02-11-20 @ 01:18), 160.02 (02-10-20 @ 16:50)  Weight (kg): 67.6 (02-11-20 @ 01:18), 71 (02-10-20 @ 16:50)  BMI (kg/m2): 24.1 (02-11-20 @ 01:18), 27.7 (02-10-20 @ 16:50)    [x ] PPSV2 < or = 30% [ ] significant weight loss [ x] poor nutritional intake [ ] anasarca Albumin, Serum: 4.1 g/dL (02-10-20 @ 17:46)    Artificial Nutrition [ ]     REFERRALS:   [ ]Chaplaincy  [ ] Hospice  [ ]Child Life  [x ]Social Work  [ ]Case management [ ]Holistic Therapy [ ] Physical Therapy [ ] Dietary   Goals of Care Document: GAP TEAM PALLIATIVE CARE UNIT PROGRESS NOTE:      [  ] Patient on hospice program.    INDICATION FOR PALLIATIVE CARE UNIT SERVICES:  (Transfer from  [facility] ) In the PCU for compassionate extubation in setting of  L MCA infarction    INTERVAL HPI/OVERNIGHT EVENTS: Ativan prn x 3 and Morphine prn x 1 in the past 24 hours for intermittent agitation and flailing of left arm. Ativan 1 mg ordered IV q 6 ATC. Daughter agreeable. Patient appeared more comfortable on assessment.    DNR on chart: Yes    Allergies    No Known Allergies    Intolerances    MEDICATIONS  (STANDING):  clocortolone 0.1% 1 Application(s) 1 Application(s) Topical two times a day  LORazepam   Injectable 1 milliGRAM(s) IV Push every 6 hours  morphine  Infusion 0.5 mG/Hr (0.5 mL/Hr) IV Continuous <Continuous>  sodium chloride 0.9%. 1000 milliLiter(s) (10 mL/Hr) IV Continuous <Continuous>    MEDICATIONS  (PRN):  acetaminophen  Suppository .. 650 milliGRAM(s) Rectal every 6 hours PRN Temp greater or equal to 38C (100.4F)  bisacodyl Suppository 10 milliGRAM(s) Rectal daily PRN Constipation  glycopyrrolate Injectable 0.4 milliGRAM(s) IV Push every 6 hours PRN Secretions  LORazepam   Injectable 2 milliGRAM(s) IV Push every 1 hour PRN Agitation/ Agitation  LORazepam   Injectable 2 milliGRAM(s) IV Push every 1 hour PRN Myoclonus  morphine  - Injectable 4 milliGRAM(s) IV Push every 1 hour PRN Pain  morphine  - Injectable 4 milliGRAM(s) IV Push every 1 hour PRN Dyspnea      ITEMS UNCHECKED ARE NOT PRESENT    PRESENT SYMPTOMS: [ x]Unable to obtain verbally due to poor mentation from stroke  Source if other than patient:  [ ]Family   [ ]Team     Pain: [ ] yes [ ] no intermittently based on painads  QOL impact -   Location -                    Aggravating factors -  Quality -  Radiation -  Timing-  Severity (0-10 scale):  Minimal acceptable level (0-10 scale):     Dyspnea:                           [ ]Mild [ ]Moderate [ ]Severe  Anxiety:                             [ ]Mild [ ]Moderate [ ]Severe  Fatigue:                             [ ]Mild [ ]Moderate [ ]Severe  Nausea:                             [ ]Mild [ ]Moderate [ ]Severe  Loss of appetite:              [ ]Mild [ ]Moderate [ ]Severe  Constipation:                    [ ]Mild [ ]Moderate [ ]Severe    PAINAD Score:  0    [email or web address] (Ctrl +  left click to view)  		  Other Symptoms:  [ ]All other review of systems negative     Palliative Performance Status Version 2:  10 %         [email or web address]    PHYSICAL EXAM:  Vital Signs Last 24 Hrs  T(C): 37 (20 Feb 2020 08:17), Max: 37 (20 Feb 2020 08:17)  T(F): 98.6 (20 Feb 2020 08:17), Max: 98.6 (20 Feb 2020 08:17)  HR: 87 (20 Feb 2020 08:17) (87 - 87)  BP: 155/73 (20 Feb 2020 08:17) (155/73 - 155/73)  BP(mean): --  RR: 18 (20 Feb 2020 08:17) (18 - 18)  SpO2: 95% (20 Feb 2020 08:17) (95% - 95%)    16 Feb 2020 07:01  -  17 Feb 2020 07:00  --------------------------------------------------------  IN: 0 mL / OUT: 1575 mL / NET: -1575 mL    GENERAL:  [ ]Alert  [ ]Oriented x   [x ]Lethargic  [ ]Cachexia  [ ]Unarousable  [ ]Verbal  [x ]Non-Verbal  Behavioral:   [ ] Anxiety  [ ] Delirium [x ] Agitation (intermittent) [ ] Other  HEENT:  [x ]Normal   [ ]Dry mouth   [ ]ET Tube/Trach  [ ]Oral lesions  PULMONARY:   [ ]Clear [ ]Tachypnea  [ ]Audible excessive secretions   [x ]Rhonchi        [ ]Right [ ]Left [x ]Bilateral  [ ]Crackles        [ ]Right [ ]Left [ ]Bilateral  [ ]Wheezing     [ ]Right [ ]Left [ ]Bilateral  [ ]Diminshed BS [ ]Right [ ]Left [ ]Bilateral    CARDIOVASCULAR:    [ ]Regular [x ]Irregular [ ]Tachy  [ ]Fan [ ]Murmur [x ]Other S1/S2 audible  GASTROINTESTINAL:  [x ]Soft  [ ]Distended   [ x]+BS  [x ]Non tender [ ]Tender  [ ]PEG [ ]OGT/ NGT   Last BM:  2/15/20   GENITOURINARY:  [ ]Normal [x ] Incontinent   [ ]Oliguria/Anuria   [x ]Michael  MUSCULOSKELETAL:   [ ]Normal   [ ]Weakness [ x]Bed/Wheelchair bound [ x]Edema-right upper and lower extremities  NEUROLOGIC:   [ ]No focal deficits  [ x] Cognitive impairment  [ x Dysphagia [ ]Dysarthria [x ] Paresis-right side [ ]Other   SKIN:  ecchymoses  [x ]Normal  [ ]Rash     [ ]Pressure ulcer(s)  [ ]y [ ]n  Present on admission      CRITICAL CARE:  [ ] Shock Present  [ ]Septic [ ]Cardiogenic [ ]Neurologic [ ]Hypovolemic  [ ]  Vasopressors [ ]  Inotropes   [x ] Respiratory failure present [ ] Mechanical Ventilation [ ] Non-invasive ventilatory support [ ] High-Flow   [x] S/P Compassionate extubation 2/16  [ ] Acute  [ ] Chronic [ ] Hypoxic  [ ] Hypercarbic [ ] Other  [x ] Other organ failure  brain    LABS: None new      RADIOLOGY & ADDITIONAL STUDIES: None new    PROTEIN CALORIE MALNUTRITION: [ ] mild [x ] moderate [ ] severe  [ ] underweight [ ] morbid obesity    https://www.andeal.org/vault/2440/web/files/ONC/Table_Clinical%20Characteristics%20to%20Document%20Malnutrition-White%20JV%20et%20al%509485.pdf    Height (cm): 167.6 (02-11-20 @ 01:18), 160.02 (02-10-20 @ 16:50)  Weight (kg): 67.6 (02-11-20 @ 01:18), 71 (02-10-20 @ 16:50)  BMI (kg/m2): 24.1 (02-11-20 @ 01:18), 27.7 (02-10-20 @ 16:50)    [x ] PPSV2 < or = 30% [ ] significant weight loss [ x] poor nutritional intake [ ] anasarca Albumin, Serum: 4.1 g/dL (02-10-20 @ 17:46)    Artificial Nutrition [ ]     REFERRALS:   [ ]Chaplaincy  [ ] Hospice  [ ]Child Life  [x ]Social Work  [ ]Case management [ ]Holistic Therapy [ ] Physical Therapy [ ] Dietary   Goals of Care Document:

## 2020-02-20 NOTE — PROGRESS NOTE ADULT - PROBLEM SELECTOR PLAN 2
2/2 to CVA inability to control airway . S/p compassionate extubation in the PCU 2/16/20.  As per daughter Laurie who is the surrogate (only daughter), her mother had expressed wishes in the past of not wanting a life dependent on machines.     Morphine gtt remains @ 0.5 mg/hour. No prn use of Morphine in the past 24 hours. Breathing unlabored and patient comfortable on assessment this morning. S/p compassionate extubation in the PCU 2/16/20.    Morphine gtt remains @ 0.5 mg/hour. No prn use of Morphine in the past 24 hours.  Dyspnea remains controlled. S/p compassionate extubation in the PCU 2/16/20.    Morphine gtt remains @ 0.5 mg/hour. PRN Morphine x 1 in the past 24 hours. Breathing appears unlabored on assessment with intermittent periods of snoring.

## 2020-02-21 RX ORDER — MORPHINE SULFATE 50 MG/1
2 CAPSULE, EXTENDED RELEASE ORAL
Qty: 100 | Refills: 0 | Status: DISCONTINUED | OUTPATIENT
Start: 2020-02-21 | End: 2020-02-25

## 2020-02-21 RX ORDER — MORPHINE SULFATE 50 MG/1
4 CAPSULE, EXTENDED RELEASE ORAL
Refills: 0 | Status: DISCONTINUED | OUTPATIENT
Start: 2020-02-21 | End: 2020-02-25

## 2020-02-21 RX ORDER — HALOPERIDOL DECANOATE 100 MG/ML
1 INJECTION INTRAMUSCULAR
Refills: 0 | Status: DISCONTINUED | OUTPATIENT
Start: 2020-02-21 | End: 2020-02-29

## 2020-02-21 RX ORDER — HALOPERIDOL DECANOATE 100 MG/ML
1 INJECTION INTRAMUSCULAR EVERY 6 HOURS
Refills: 0 | Status: DISCONTINUED | OUTPATIENT
Start: 2020-02-21 | End: 2020-02-29

## 2020-02-21 RX ORDER — HALOPERIDOL DECANOATE 100 MG/ML
1 INJECTION INTRAMUSCULAR ONCE
Refills: 0 | Status: COMPLETED | OUTPATIENT
Start: 2020-02-21 | End: 2020-02-21

## 2020-02-21 RX ADMIN — HALOPERIDOL DECANOATE 1 MILLIGRAM(S): 100 INJECTION INTRAMUSCULAR at 08:08

## 2020-02-21 RX ADMIN — Medication 2 MILLIGRAM(S): at 15:54

## 2020-02-21 RX ADMIN — Medication 1 MILLIGRAM(S): at 05:46

## 2020-02-21 RX ADMIN — Medication 2 MILLIGRAM(S): at 04:13

## 2020-02-21 RX ADMIN — MORPHINE SULFATE 0.5 MG/HR: 50 CAPSULE, EXTENDED RELEASE ORAL at 07:42

## 2020-02-21 RX ADMIN — MORPHINE SULFATE 2 MG/HR: 50 CAPSULE, EXTENDED RELEASE ORAL at 08:09

## 2020-02-21 RX ADMIN — HALOPERIDOL DECANOATE 1 MILLIGRAM(S): 100 INJECTION INTRAMUSCULAR at 13:17

## 2020-02-21 RX ADMIN — MORPHINE SULFATE 4 MILLIGRAM(S): 50 CAPSULE, EXTENDED RELEASE ORAL at 14:30

## 2020-02-21 RX ADMIN — MORPHINE SULFATE 2 MG/HR: 50 CAPSULE, EXTENDED RELEASE ORAL at 18:32

## 2020-02-21 RX ADMIN — ROBINUL 0.4 MILLIGRAM(S): 0.2 INJECTION INTRAMUSCULAR; INTRAVENOUS at 15:54

## 2020-02-21 RX ADMIN — HALOPERIDOL DECANOATE 1 MILLIGRAM(S): 100 INJECTION INTRAMUSCULAR at 17:36

## 2020-02-21 RX ADMIN — SODIUM CHLORIDE 10 MILLILITER(S): 9 INJECTION INTRAMUSCULAR; INTRAVENOUS; SUBCUTANEOUS at 07:43

## 2020-02-21 NOTE — PROGRESS NOTE ADULT - PROBLEM SELECTOR PLAN 5
Patient with devastating L MCA infarct, S/P palliative Extubation 2/16.  Met with daughter, GILBERTO Wiggins and myself yesterday. Supportive care given to daughter as she remains struggling with the acute nature of her mother's CVA and now loss of independence. She described her mother as a very strong woman who walked 45-50 minutes daily back and forth to her home and had a love for her dog even though she was not an animal lover. She knows she is doing the right thing for her mother, however, is struggling. Patient's 31 year old granddaughter was very close to the patient and she showed us pictures of their travels together.     Discussed again the possible transition to hospice and what it could provide for the patient and family. She would [facility], but seemed to feel it would likely be a good transition given the close location to her home. Offered to speak with the patient's granddaughter to give her support as well and she will reach out to her and let us know.     ENRICO present in chart.      ENRICO present in the chart.

## 2020-02-21 NOTE — PROGRESS NOTE ADULT - PROBLEM SELECTOR PLAN 3
Secondary to delirium in the setting of acute stroke. Ativan prn x 3 in the past 24 hours in addition to ATC dosing. Haldol 1 mg given x 1 this morning IV with very good effect. Haldol 1 mg ordered IV q 6 ATC and 1 mg prn q 2. Will continue to monitor effectiveness and titrate medication as needed.

## 2020-02-21 NOTE — PROGRESS NOTE ADULT - ASSESSMENT
78 year old female presented with unresponsiveness at home. At [place] she was found to have R hemiparesis and global aphasia, consistent with a L MCA syndrome. Her CT head reveals an acute L MCA infarction. 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 Not a candidate for IV tPA due to out of window.  Patient was transferred to the PCU for compassionate extubation which took place on  2/16/20 in the PCU. Patient remains minimally to unresponsive with non-purposeful movements on the left side.     Ativan prn x 3 and Morphine prn x 3 in the past 24 hours. Patient was given a one time dose of Haldol this morning with good effect. ATC Ativan discontinued and Haldol 1 mg ordered IV q 6 ATC. Patient had agitation and left arm flailing that improved after administration of Haldol. Morphine drip increased to 2 mg/hour for comfort.

## 2020-02-21 NOTE — PROGRESS NOTE ADULT - PROBLEM SELECTOR PLAN 1
Acute left MCA cva, outside of the window for thrombolytic therapy. Exam remains poor with no hope for meaningful recovery.

## 2020-02-21 NOTE — PROGRESS NOTE ADULT - ATTENDING COMMENTS
I have personally seen and examined this patient and agree with the above assessment and plan, which I have reviewed and edited where appropriate.     GOC: management of symptoms in the setting of a neurologically devastating stroke.  Symptoms: agitation, dyspnea  Disposition: Daughter willing [facility] [place] as [place] close to her home.  Hospice to evaluate for inpatient services.

## 2020-02-21 NOTE — PROGRESS NOTE ADULT - PROBLEM SELECTOR PLAN 2
S/p compassionate extubation in the PCU 2/16/20.    Morphine prn x 3 in the past 24 hours. Morphine drip increased to 2 mg/hour and prn doses remain at 4 mg. PRN Morphine x 1 in the past 24 hours. Intermittent labored breathing that may also be 2/2 to agitation.

## 2020-02-21 NOTE — PROGRESS NOTE ADULT - SUBJECTIVE AND OBJECTIVE BOX
GAP TEAM PALLIATIVE CARE UNIT PROGRESS NOTE:      [  ] Patient on hospice program.    INDICATION FOR PALLIATIVE CARE UNIT SERVICES:  (Transfer from  [facility] ) In the PCU for compassionate extubation in setting of  L MCA infarction    INTERVAL HPI/OVERNIGHT EVENTS: Ativan prn x 3 and Morphine prn x 3 in the past 24 hours in addition to Morphine drip and ATC Ativan. Patient remains with intermittent agitation and flailing of left arm. Haldol 1 mg IV given this morning with good effect. Ativan ATC discontinued and Haldol 1 mg q 6 ATC ordered. Morphine drip increased to 2 mg/hour and prn doses remain at 4 mg.    DNR on chart: Yes    Allergies    No Known Allergies    Intolerances    MEDICATIONS  (STANDING):  clocortolone 0.1% 1 Application(s) 1 Application(s) Topical two times a day  haloperidol    Injectable 1 milliGRAM(s) IV Push every 6 hours  morphine  Infusion 2 mG/Hr (2 mL/Hr) IV Continuous <Continuous>  sodium chloride 0.9%. 1000 milliLiter(s) (10 mL/Hr) IV Continuous <Continuous>    MEDICATIONS  (PRN):  acetaminophen  Suppository .. 650 milliGRAM(s) Rectal every 6 hours PRN Temp greater or equal to 38C (100.4F)  bisacodyl Suppository 10 milliGRAM(s) Rectal daily PRN Constipation  glycopyrrolate Injectable 0.4 milliGRAM(s) IV Push every 6 hours PRN Secretions  haloperidol    Injectable 1 milliGRAM(s) IV Push every 2 hours PRN agitation  LORazepam   Injectable 2 milliGRAM(s) IV Push every 1 hour PRN Agitation/ Agitation  morphine  - Injectable 4 milliGRAM(s) IV Push every 1 hour PRN Pain  morphine  - Injectable 4 milliGRAM(s) IV Push every 1 hour PRN Dyspnea    ITEMS UNCHECKED ARE NOT PRESENT    PRESENT SYMPTOMS: [ x]Unable to obtain verbally due to poor mentation from stroke  Source if other than patient:  [ ]Family   [ ]Team     Pain: [ ] yes [ ] no  QOL impact -   Location -                    Aggravating factors -  Quality -  Radiation -  Timing-  Severity (0-10 scale):  Minimal acceptable level (0-10 scale):     Dyspnea:                           [ ]Mild [ ]Moderate [ ]Severe  Anxiety:                             [ ]Mild [ ]Moderate [ ]Severe  Fatigue:                             [ ]Mild [ ]Moderate [ ]Severe  Nausea:                             [ ]Mild [ ]Moderate [ ]Severe  Loss of appetite:              [ ]Mild [ ]Moderate [ ]Severe  Constipation:                    [ ]Mild [ ]Moderate [ ]Severe    PAINAD Score:  4 -> 0 after increase in Morphine drip and Haldol administration    [email or web address] (Ctrl +  left click to view)  		  Other Symptoms:  [ ]All other review of systems negative     Palliative Performance Status Version 2:  10 %         [email or web address]    PHYSICAL EXAM:  Vital Signs Last 24 Hrs  T(C): 36.9 (21 Feb 2020 07:31), Max: 36.9 (21 Feb 2020 07:31)  T(F): 98.4 (21 Feb 2020 07:31), Max: 98.4 (21 Feb 2020 07:31)  HR: 98 (21 Feb 2020 07:31) (98 - 98)  BP: 152/78 (21 Feb 2020 07:31) (152/78 - 152/78)  BP(mean): --  RR: 20 (21 Feb 2020 07:31) (20 - 20)  SpO2: 94% (21 Feb 2020 07:31) (94% - 94%)    16 Feb 2020 07:01  -  17 Feb 2020 07:00  --------------------------------------------------------  IN: 0 mL / OUT: 1575 mL / NET: -1575 mL    GENERAL:  [ ]Alert  [ ]Oriented x   [x ]Lethargic  [ ]Cachexia  [ ]Unarousable  [ ]Verbal  [x ]Non-Verbal  Behavioral:   [ ] Anxiety  [ ] Delirium [x ] Agitation (intermittent) [ ] Other  HEENT:  [x ]Normal   [ ]Dry mouth   [ ]ET Tube/Trach  [ ]Oral lesions  PULMONARY:   [ ]Clear [ ]Tachypnea  [ ]Audible excessive secretions   [x ]Rhonchi        [ ]Right [ ]Left [x ]Bilateral  [ ]Crackles        [ ]Right [ ]Left [ ]Bilateral  [ ]Wheezing     [ ]Right [ ]Left [ ]Bilateral  [ ]Diminshed BS [ ]Right [ ]Left [ ]Bilateral    CARDIOVASCULAR:    [ ]Regular [x ]Irregular [ ]Tachy  [ ]Fan [ ]Murmur [x ]Other S1/S2 audible  GASTROINTESTINAL:  [x ]Soft  [ ]Distended   [ x]+BS  [x ]Non tender [ ]Tender  [ ]PEG [ ]OGT/ NGT   Last BM:  2/15/20   GENITOURINARY:  [ ]Normal [x ] Incontinent   [ ]Oliguria/Anuria   [x ]Michael  MUSCULOSKELETAL:   [ ]Normal   [ ]Weakness [ x]Bed/Wheelchair bound [ x]Edema-right upper and lower extremities  NEUROLOGIC:   [ ]No focal deficits  [ x] Cognitive impairment  [ x Dysphagia [ ]Dysarthria [x ] Paresis-right side [ ]Other   SKIN:  ecchymoses  [x ]Normal  [ ]Rash     [ ]Pressure ulcer(s)  [ ]y [ ]n  Present on admission      CRITICAL CARE:  [ ] Shock Present  [ ]Septic [ ]Cardiogenic [ ]Neurologic [ ]Hypovolemic  [ ]  Vasopressors [ ]  Inotropes   [x ] Respiratory failure present [ ] Mechanical Ventilation [ ] Non-invasive ventilatory support [ ] High-Flow   [x] S/P Compassionate extubation 2/16  [ ] Acute  [ ] Chronic [ ] Hypoxic  [ ] Hypercarbic [ ] Other  [x ] Other organ failure  brain    LABS: None new      RADIOLOGY & ADDITIONAL STUDIES: None new    PROTEIN CALORIE MALNUTRITION: [ ] mild [x ] moderate [ ] severe  [ ] underweight [ ] morbid obesity    https://www.andeal.org/vault/2440/web/files/ONC/Table_Clinical%20Characteristics%20to%20Document%20Malnutrition-White%20JV%20et%20al%356152.pdf    Height (cm): 167.6 (02-11-20 @ 01:18), 160.02 (02-10-20 @ 16:50)  Weight (kg): 67.6 (02-11-20 @ 01:18), 71 (02-10-20 @ 16:50)  BMI (kg/m2): 24.1 (02-11-20 @ 01:18), 27.7 (02-10-20 @ 16:50)    [x ] PPSV2 < or = 30% [ ] significant weight loss [ x] poor nutritional intake [ ] anasarca Albumin, Serum: 4.1 g/dL (02-10-20 @ 17:46)    Artificial Nutrition [ ]     REFERRALS:   [ ]Chaplaincy  [ ] Hospice  [ ]Child Life  [x ]Social Work  [ ]Case management [ ]Holistic Therapy [ ] Physical Therapy [ ] Dietary   Goals of Care Document: GAP TEAM PALLIATIVE CARE UNIT PROGRESS NOTE:      [  ] Patient on hospice program.    INDICATION FOR PALLIATIVE CARE UNIT SERVICES:  (Transfer from  [facility] ) In the PCU for compassionate extubation in setting of  L MCA infarction    INTERVAL HPI/OVERNIGHT EVENTS: Ativan prn x 3 and Morphine prn x 3 in the past 24 hours in addition to Morphine drip and ATC Ativan. Patient remains with intermittent agitation and flailing of left arm. Haldol 1 mg IV given this morning with good effect. Ativan ATC discontinued and Haldol 1 mg q 6 ATC ordered. Morphine drip increased to 2 mg/hour and prn doses remain at 4 mg.    DNR on chart: Yes    Allergies    No Known Allergies    Intolerances    MEDICATIONS  (STANDING):  clocortolone 0.1% 1 Application(s) 1 Application(s) Topical two times a day  haloperidol    Injectable 1 milliGRAM(s) IV Push every 6 hours  morphine  Infusion 2 mG/Hr (2 mL/Hr) IV Continuous <Continuous>  sodium chloride 0.9%. 1000 milliLiter(s) (10 mL/Hr) IV Continuous <Continuous>    MEDICATIONS  (PRN):  acetaminophen  Suppository .. 650 milliGRAM(s) Rectal every 6 hours PRN Temp greater or equal to 38C (100.4F)  bisacodyl Suppository 10 milliGRAM(s) Rectal daily PRN Constipation  glycopyrrolate Injectable 0.4 milliGRAM(s) IV Push every 6 hours PRN Secretions  haloperidol    Injectable 1 milliGRAM(s) IV Push every 2 hours PRN agitation  LORazepam   Injectable 2 milliGRAM(s) IV Push every 1 hour PRN Agitation/ Agitation  morphine  - Injectable 4 milliGRAM(s) IV Push every 1 hour PRN Pain  morphine  - Injectable 4 milliGRAM(s) IV Push every 1 hour PRN Dyspnea    ITEMS UNCHECKED ARE NOT PRESENT    PRESENT SYMPTOMS: [ x]Unable to obtain verbally due to poor mentation from stroke  Source if other than patient:  [ ]Family   [ ]Team     Pain: [ ] yes [ ] no  QOL impact -   Location -                    Aggravating factors -  Quality -  Radiation -  Timing-  Severity (0-10 scale):  Minimal acceptable level (0-10 scale):     Dyspnea:                           [ ]Mild [ ]Moderate [ ]Severe  Anxiety:                             [ ]Mild [ ]Moderate [ ]Severe  Fatigue:                             [ ]Mild [ ]Moderate [ ]Severe  Nausea:                             [ ]Mild [ ]Moderate [ ]Severe  Loss of appetite:              [ ]Mild [ ]Moderate [ ]Severe  Constipation:                    [ ]Mild [ ]Moderate [ ]Severe    PAINAD Score:  4 -> 0 after increase in Morphine drip and Haldol administration    [email or web address] (Ctrl +  left click to view)  		  Other Symptoms:  [x ]All other review of systems negative     Palliative Performance Status Version 2:  10 %         [email or web address]    PHYSICAL EXAM:  Vital Signs Last 24 Hrs  T(C): 36.9 (21 Feb 2020 07:31), Max: 36.9 (21 Feb 2020 07:31)  T(F): 98.4 (21 Feb 2020 07:31), Max: 98.4 (21 Feb 2020 07:31)  HR: 98 (21 Feb 2020 07:31) (98 - 98)  BP: 152/78 (21 Feb 2020 07:31) (152/78 - 152/78)  BP(mean): --  RR: 20 (21 Feb 2020 07:31) (20 - 20)  SpO2: 94% (21 Feb 2020 07:31) (94% - 94%)    16 Feb 2020 07:01  -  17 Feb 2020 07:00  --------------------------------------------------------  IN: 0 mL / OUT: 1575 mL / NET: -1575 mL    GENERAL:  [ ]Alert  [ ]Oriented x   [x ]Lethargic  [ ]Cachexia  [x ]Unarousable  [ ]Verbal  [x ]Non-Verbal  Behavioral:   [ ] Anxiety  [ ] Delirium [x ] Agitation (intermittent) [ ] Other  HEENT:  [x ]Normal   [ ]Dry mouth   [ ]ET Tube/Trach  [ ]Oral lesions  PULMONARY:   [ ]Clear [ ]Tachypnea  [ ]Audible excessive secretions   [x ]Rhonchi        [ ]Right [ ]Left [x ]Bilateral  [ ]Crackles        [ ]Right [ ]Left [ ]Bilateral  [ ]Wheezing     [ ]Right [ ]Left [ ]Bilateral  [ ]Diminshed BS [ ]Right [ ]Left [ ]Bilateral    CARDIOVASCULAR:    [ ]Regular [x ]Irregular [ ]Tachy  [ ]Fan [ ]Murmur [x ]Other S1/S2 audible  GASTROINTESTINAL:  [x ]Soft  [ ]Distended   [ x]+BS  [x ]Non tender [ ]Tender  [ ]PEG [ ]OGT/ NGT   Last BM:  2/15/20   GENITOURINARY:  [ ]Normal [x ] Incontinent   [ ]Oliguria/Anuria   [x ]Michael  MUSCULOSKELETAL:   [ ]Normal   [ ]Weakness [ x]Bed/Wheelchair bound [ x]Edema-right upper and lower extremities  NEUROLOGIC:   [ ]No focal deficits  [ x] Cognitive impairment  [ x Dysphagia [ ]Dysarthria [x ] Paresis-right side [ ]Other   SKIN:  ecchymoses  [x ]Normal  [ ]Rash     [ ]Pressure ulcer(s)  [ ]y [ ]n  Present on admission      CRITICAL CARE:  [ ] Shock Present  [ ]Septic [ ]Cardiogenic [ ]Neurologic [ ]Hypovolemic  [ ]  Vasopressors [ ]  Inotropes   [x ] Respiratory failure present [ ] Mechanical Ventilation [ ] Non-invasive ventilatory support [ ] High-Flow   [x] S/P Compassionate extubation 2/16  [ ] Acute  [ ] Chronic [ ] Hypoxic  [ ] Hypercarbic [ ] Other  [x ] Other organ failure  brain    LABS: None new      RADIOLOGY & ADDITIONAL STUDIES: None new    PROTEIN CALORIE MALNUTRITION: [ ] mild [x ] moderate [ ] severe  [ ] underweight [ ] morbid obesity    https://www.andeal.org/vault/2440/web/files/ONC/Table_Clinical%20Characteristics%20to%20Document%20Malnutrition-White%20JV%20et%20al%912100.pdf    Height (cm): 167.6 (02-11-20 @ 01:18), 160.02 (02-10-20 @ 16:50)  Weight (kg): 67.6 (02-11-20 @ 01:18), 71 (02-10-20 @ 16:50)  BMI (kg/m2): 24.1 (02-11-20 @ 01:18), 27.7 (02-10-20 @ 16:50)    [x ] PPSV2 < or = 30% [ ] significant weight loss [ x] poor nutritional intake [ ] anasarca Albumin, Serum: 4.1 g/dL (02-10-20 @ 17:46)    Artificial Nutrition [ ]     REFERRALS:   [ ]Chaplaincy  [ ] Hospice  [ ]Child Life  [x ]Social Work  [ ]Case management [ ]Holistic Therapy [ ] Physical Therapy [ ] Dietary   Goals of Care Document:

## 2020-02-22 PROCEDURE — 99233 SBSQ HOSP IP/OBS HIGH 50: CPT | Mod: GC

## 2020-02-22 RX ADMIN — HALOPERIDOL DECANOATE 1 MILLIGRAM(S): 100 INJECTION INTRAMUSCULAR at 17:23

## 2020-02-22 RX ADMIN — MORPHINE SULFATE 2 MG/HR: 50 CAPSULE, EXTENDED RELEASE ORAL at 19:46

## 2020-02-22 RX ADMIN — MORPHINE SULFATE 2 MG/HR: 50 CAPSULE, EXTENDED RELEASE ORAL at 15:38

## 2020-02-22 RX ADMIN — Medication 10 MILLIGRAM(S): at 15:38

## 2020-02-22 RX ADMIN — MORPHINE SULFATE 2 MG/HR: 50 CAPSULE, EXTENDED RELEASE ORAL at 07:42

## 2020-02-22 RX ADMIN — SODIUM CHLORIDE 10 MILLILITER(S): 9 INJECTION INTRAMUSCULAR; INTRAVENOUS; SUBCUTANEOUS at 19:47

## 2020-02-22 RX ADMIN — HALOPERIDOL DECANOATE 1 MILLIGRAM(S): 100 INJECTION INTRAMUSCULAR at 05:31

## 2020-02-22 RX ADMIN — HALOPERIDOL DECANOATE 1 MILLIGRAM(S): 100 INJECTION INTRAMUSCULAR at 12:48

## 2020-02-22 RX ADMIN — MORPHINE SULFATE 2 MG/HR: 50 CAPSULE, EXTENDED RELEASE ORAL at 05:34

## 2020-02-22 RX ADMIN — HALOPERIDOL DECANOATE 1 MILLIGRAM(S): 100 INJECTION INTRAMUSCULAR at 00:10

## 2020-02-22 NOTE — PROGRESS NOTE ADULT - ATTENDING COMMENTS
Patient seen and examined and agree with the above documentation with the following additions:   Patient comfortable on exam, minimal PRNs required over last 24 hours  will continue current regimen. no family at bedside at time of evaluation. rest of management as documented above

## 2020-02-22 NOTE — PROGRESS NOTE ADULT - PROBLEM SELECTOR PLAN 3
Secondary to delirium in the setting of acute stroke.   Ativan prn in addition to ATC dosing.   Haldol 1 mg given x 1 this morning IV with very good effect.   Haldol 1 mg ordered IV q 6 ATC and 1 mg prn q 2.   Will continue to monitor effectiveness and titrate medication as needed.

## 2020-02-22 NOTE — PROGRESS NOTE ADULT - PROBLEM SELECTOR PLAN 5
Patient with L MCA infarct, S/P palliative Extubation 2/16.  Met with daughter, GILBERTO Wiggins. Supportive care given to daughter as she remains struggling with the acute nature of her mother's CVA and now loss of independence.    Possible transition to hospice. She would like to visit [facility] tomorrow or Saturday, but seemed to feel it would likely be a good transition given the close location to her home.   ENRICO present in chart.

## 2020-02-22 NOTE — PROGRESS NOTE ADULT - ASSESSMENT
78 year old female presented with unresponsiveness at home. At [place] she was found to have R hemiparesis and global aphasia, consistent with a L MCA syndrome. Her CT head reveals an acute L MCA infarction. 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 Not a candidate for IV tPA due to out of window.  Patient was transferred to the PCU for compassionate extubation which took place on  2/16/20 in the PCU. Patient remains minimally to unresponsive with non-purposeful movements on the left side.

## 2020-02-22 NOTE — PROGRESS NOTE ADULT - SUBJECTIVE AND OBJECTIVE BOX
GAP TEAM PALLIATIVE CARE UNIT PROGRESS NOTE:      [  ] Patient on hospice program.    INDICATION FOR PALLIATIVE CARE UNIT SERVICES:  (Transfer from  [facility] ) In the PCU for compassionate extubation in setting of  L MCA infarction    INTERVAL HPI/OVERNIGHT EVENTS:  Patient on a morphine drip.  Patient used 1 PRN of Morphine in the last 24hrs, used 1 dose of ativan PRN and 1 dose of Robinul PRN.    DNR on chart: Yes    Allergies    No Known Allergies    Intolerances    MEDICATIONS  (STANDING):  clocortolone 0.1% 1 Application(s) 1 Application(s) Topical two times a day  haloperidol    Injectable 1 milliGRAM(s) IV Push every 6 hours  morphine  Infusion 2 mG/Hr (2 mL/Hr) IV Continuous <Continuous>  sodium chloride 0.9%. 1000 milliLiter(s) (10 mL/Hr) IV Continuous <Continuous>    MEDICATIONS  (PRN):  acetaminophen  Suppository .. 650 milliGRAM(s) Rectal every 6 hours PRN Temp greater or equal to 38C (100.4F)  bisacodyl Suppository 10 milliGRAM(s) Rectal daily PRN Constipation  glycopyrrolate Injectable 0.4 milliGRAM(s) IV Push every 6 hours PRN Secretions  haloperidol    Injectable 1 milliGRAM(s) IV Push every 2 hours PRN agitation  LORazepam   Injectable 2 milliGRAM(s) IV Push every 1 hour PRN Agitation/ Agitation  morphine  - Injectable 4 milliGRAM(s) IV Push every 1 hour PRN Pain  morphine  - Injectable 4 milliGRAM(s) IV Push every 1 hour PRN Dyspnea      ITEMS UNCHECKED ARE NOT PRESENT    PRESENT SYMPTOMS: [ x ]Unable to obtain due to poor mentation   Source if other than patient:  [ ]Family   [ ]Team     Pain: [ ] yes [ ] no  QOL impact -   Location -                    Aggravating factors -  Quality -  Radiation -  Timing-  Severity (0-10 scale):  Minimal acceptable level (0-10 scale):     Dyspnea:                           [ ]Mild [ ]Moderate [ ]Severe  Anxiety:                             [ ]Mild [ ]Moderate [ ]Severe  Fatigue:                             [ ]Mild [ ]Moderate [ ]Severe  Nausea:                             [ ]Mild [ ]Moderate [ ]Severe  Loss of appetite:              [ ]Mild [ ]Moderate [ ]Severe  Constipation:                    [ ]Mild [ ]Moderate [ ]Severe    PAINAD Score: 4 to 0 after increase in morphine drip and haldol     [email or web address] (Ctrl +  left click to view)  		  Other Symptoms:  [ x ]All other review of systems negative     Karnofsky Performance Score/Palliative Performance Status Version 2:         10%        [email or web address]    PHYSICAL EXAM:  Vital Signs Last 24 Hrs  T(C): 36.8 (22 Feb 2020 08:20), Max: 36.8 (22 Feb 2020 08:20)  T(F): 98.2 (22 Feb 2020 08:20), Max: 98.2 (22 Feb 2020 08:20)  HR: 107 (22 Feb 2020 08:20) (107 - 107)  BP: 149/86 (22 Feb 2020 08:20) (149/86 - 149/86)  BP(mean): --  RR: 18 (22 Feb 2020 08:20) (18 - 18)  SpO2: 93% (22 Feb 2020 08:20) (93% - 93%) I&O's Summary    21 Feb 2020 07:01  -  22 Feb 2020 07:00  --------------------------------------------------------  IN: 0 mL / OUT: 800 mL / NET: -800 mL    GENERAL:  [ ]Alert  [ ]Oriented x   [ x ]Lethargic  [ ]Cachexia  [ x ]Unarousable  [ ]Verbal  [ x ]Non-Verbal  Behavioral:   [ ] Anxiety  [ ] Delirium [ x ] Agitation (intermittent) [ ] Other  HEENT:  [ x ]Normal   [ ]Dry mouth   [ ]ET Tube/Trach  [ ]Oral lesions  PULMONARY:   [ ]Clear [ ]Tachypnea  [ ]Audible excessive secretions   [ x ]Rhonchi        [ ]Right [ ]Left [x ]Bilateral  [ ]Crackles        [ ]Right [ ]Left [ ]Bilateral  [ ]Wheezing     [ ]Right [ ]Left [ ]Bilateral  [ ]Diminshed BS [ ]Right [ ]Left [ ]Bilateral    CARDIOVASCULAR:    [ ]Regular [ x ]Irregular [ ]Tachy  [ ]Fan [ ]Murmur [ x ]Other S1/S2 audible  GASTROINTESTINAL:  [ x ]Soft  [ ]Distended   [ x ]+BS  [x ]Non tender [ ]Tender  [ ]PEG [ ]OGT/ NGT   Last BM:  2/15/20   GENITOURINARY:  [ ]Normal [ x ] Incontinent   [ ]Oliguria/Anuria   [x ]Michael  MUSCULOSKELETAL:   [ ]Normal   [ ]Weakness [ x ]Bed/Wheelchair bound [ x]Edema-right upper and lower extremities  NEUROLOGIC:   [ ]No focal deficits  [ x ] Cognitive impairment  [ x ] Dysphagia [ ]Dysarthria [ x ] Paresis-right side [ ]Other   SKIN:  ecchymoses  [ x ]Normal  [ ]Rash     [ ]Pressure ulcer(s)  [ ]y [ ]n  Present on admission      CRITICAL CARE:  [ ] Shock Present  [ ]Septic [ ]Cardiogenic [ ]Neurologic [ ]Hypovolemic  [ ]  Vasopressors [ ]  Inotropes   [ x ] Respiratory failure present [ ] Mechanical Ventilation [ ] Non-invasive ventilatory support [ ] High-Flow   [ x ] S/P Compassionate extubation 2/16  [ ] Acute  [ ] Chronic [ ] Hypoxic  [ ] Hypercarbic [ ] Other  [ x ] Other organ failure  brain    LABS:  None new    RADIOLOGY & ADDITIONAL STUDIES:  None new    PROTEIN CALORIE MALNUTRITION:   [ x ] PPSV2 < or = 30% [ ] significant weight loss [ ] poor nutritional intake [ ] anasarca [ ] catabolic state   Albumin, Serum: 4.1 g/dL (02-10-20 @ 17:46)   Artificial Nutrition [ ]     REFERRALS:   [ ]Chaplaincy  [ ] Hospice  [ ]Child Life  [ ]Social Work  [ ]Case management [ ]Holistic Therapy [ ] Physical Therapy [ ] Dietary     Goals of Care Document:   Progress Notes - Care Coordination [C. Provider] (02-21-20 @ 16:26)

## 2020-02-22 NOTE — PROGRESS NOTE ADULT - PROBLEM SELECTOR PLAN 2
S/p compassionate extubation in the PCU 2/16/20.    Morphine prn x 1 in the past 24 hours.   Morphine drip 2 mg/hour and prn doses remain at 4 mg.

## 2020-02-23 PROCEDURE — 99233 SBSQ HOSP IP/OBS HIGH 50: CPT | Mod: GC

## 2020-02-23 RX ADMIN — HALOPERIDOL DECANOATE 1 MILLIGRAM(S): 100 INJECTION INTRAMUSCULAR at 17:01

## 2020-02-23 RX ADMIN — SODIUM CHLORIDE 10 MILLILITER(S): 9 INJECTION INTRAMUSCULAR; INTRAVENOUS; SUBCUTANEOUS at 17:01

## 2020-02-23 RX ADMIN — HALOPERIDOL DECANOATE 1 MILLIGRAM(S): 100 INJECTION INTRAMUSCULAR at 11:18

## 2020-02-23 RX ADMIN — MORPHINE SULFATE 2 MG/HR: 50 CAPSULE, EXTENDED RELEASE ORAL at 20:13

## 2020-02-23 RX ADMIN — SODIUM CHLORIDE 10 MILLILITER(S): 9 INJECTION INTRAMUSCULAR; INTRAVENOUS; SUBCUTANEOUS at 07:15

## 2020-02-23 RX ADMIN — SODIUM CHLORIDE 10 MILLILITER(S): 9 INJECTION INTRAMUSCULAR; INTRAVENOUS; SUBCUTANEOUS at 20:13

## 2020-02-23 RX ADMIN — MORPHINE SULFATE 2 MG/HR: 50 CAPSULE, EXTENDED RELEASE ORAL at 07:14

## 2020-02-23 RX ADMIN — Medication 2 MILLIGRAM(S): at 13:28

## 2020-02-23 RX ADMIN — MORPHINE SULFATE 2 MG/HR: 50 CAPSULE, EXTENDED RELEASE ORAL at 17:01

## 2020-02-23 RX ADMIN — HALOPERIDOL DECANOATE 1 MILLIGRAM(S): 100 INJECTION INTRAMUSCULAR at 05:46

## 2020-02-23 RX ADMIN — Medication 650 MILLIGRAM(S): at 13:25

## 2020-02-23 RX ADMIN — HALOPERIDOL DECANOATE 1 MILLIGRAM(S): 100 INJECTION INTRAMUSCULAR at 00:39

## 2020-02-23 NOTE — PROGRESS NOTE ADULT - PROBLEM SELECTOR PLAN 2
S/p compassionate extubation in the PCU 2/16/20.    No PRN in the past 24 hours.   Morphine drip 2 mg/hour and prn doses remain at 4 mg.

## 2020-02-23 NOTE — PROGRESS NOTE ADULT - SUBJECTIVE AND OBJECTIVE BOX
GAP TEAM PALLIATIVE CARE UNIT PROGRESS NOTE:      [  ] Patient on hospice program.    INDICATION FOR PALLIATIVE CARE UNIT SERVICES:  (Transfer from  [facility] ) In the PCU for compassionate extubation in setting of  L MCA infarction    INTERVAL HPI/OVERNIGHT EVENTS:  Patient on a morphine drip.  Patient used no PRN in the last 24hrs.    DNR on chart: Yes    Allergies    No Known Allergies    Intolerances    MEDICATIONS  (STANDING):  clocortolone 0.1% 1 Application(s) 1 Application(s) Topical two times a day  haloperidol    Injectable 1 milliGRAM(s) IV Push every 6 hours  morphine  Infusion 2 mG/Hr (2 mL/Hr) IV Continuous <Continuous>  sodium chloride 0.9%. 1000 milliLiter(s) (10 mL/Hr) IV Continuous <Continuous>    MEDICATIONS  (PRN):  acetaminophen  Suppository .. 650 milliGRAM(s) Rectal every 6 hours PRN Temp greater or equal to 38C (100.4F)  bisacodyl Suppository 10 milliGRAM(s) Rectal daily PRN Constipation  glycopyrrolate Injectable 0.4 milliGRAM(s) IV Push every 6 hours PRN Secretions  haloperidol    Injectable 1 milliGRAM(s) IV Push every 2 hours PRN agitation  LORazepam   Injectable 2 milliGRAM(s) IV Push every 1 hour PRN Agitation/ Agitation  morphine  - Injectable 4 milliGRAM(s) IV Push every 1 hour PRN Pain  morphine  - Injectable 4 milliGRAM(s) IV Push every 1 hour PRN Dyspnea      ITEMS UNCHECKED ARE NOT PRESENT    PRESENT SYMPTOMS:  [ x ]Unable to obtain due to poor mentation   Source if other than patient:  [ ]Family   [ ]Team     Pain: [ ] yes [ ] no  QOL impact -   Location -                    Aggravating factors -  Quality -  Radiation -  Timing-  Severity (0-10 scale):  Minimal acceptable level (0-10 scale):     Dyspnea:                           [ ]Mild [ ]Moderate [ ]Severe  Anxiety:                             [ ]Mild [ ]Moderate [ ]Severe  Fatigue:                             [ ]Mild [ ]Moderate [ ]Severe  Nausea:                             [ ]Mild [ ]Moderate [ ]Severe  Loss of appetite:              [ ]Mild [ ]Moderate [ ]Severe  Constipation:                    [ ]Mild [ ]Moderate [ ]Severe    PAINAD Score: 4 to 0 after increase in morphine drip and haldol     [email or web address]/cog/painad.pdf (Ctrl +  left click to view)  		  Other Symptoms:  [ x ]All other review of systems negative     Karnofsky Performance Score/Palliative Performance Status Version 2:         10%         http://npcrc.org/files/news/palliative_performance_scale_[email or web address].pdf    PHYSICAL EXAM:  Vital Signs Last 24 Hrs  T(C): 37.5 (23 Feb 2020 08:16), Max: 37.5 (23 Feb 2020 08:16)  T(F): 99.5 (23 Feb 2020 08:16), Max: 99.5 (23 Feb 2020 08:16)  HR: 120 (23 Feb 2020 08:16) (120 - 120)  BP: 155/81 (23 Feb 2020 08:16) (155/81 - 155/81)  BP(mean): --  RR: 20 (23 Feb 2020 08:16) (20 - 20)  SpO2: 90% (23 Feb 2020 08:16) (90% - 90%) I&O's Summary    22 Feb 2020 07:01  -  23 Feb 2020 07:00  --------------------------------------------------------  IN: 0 mL / OUT: 550 mL / NET: -550 mL    GENERAL:  [ ]Alert  [ ]Oriented x   [ x ]Lethargic  [ ]Cachexia  [ x ]Unarousable  [ ]Verbal  [ x ]Non-Verbal  Behavioral:   [ ] Anxiety  [ ] Delirium [ x ] Agitation (intermittent) [ ] Other  HEENT:  [ x ]Normal   [ ]Dry mouth   [ ]ET Tube/Trach  [ ]Oral lesions  PULMONARY:   [ ]Clear [ ]Tachypnea  [ ]Audible excessive secretions   [ x ]Rhonchi        [ ]Right [ ]Left [x ]Bilateral  [ ]Crackles        [ ]Right [ ]Left [ ]Bilateral  [ ]Wheezing     [ ]Right [ ]Left [ ]Bilateral  [ ]Diminshed BS [ ]Right [ ]Left [ ]Bilateral    CARDIOVASCULAR:    [ ]Regular [ x ]Irregular [ ]Tachy  [ ]Fan [ ]Murmur [ x ]Other S1/S2 audible  GASTROINTESTINAL:  [ x ]Soft  [ ]Distended   [ x ]+BS  [x ]Non tender [ ]Tender  [ ]PEG [ ]OGT/ NGT   Last BM:  2/15/20   GENITOURINARY:  [ ]Normal [ x ] Incontinent   [ ]Oliguria/Anuria   [x ]Michael  MUSCULOSKELETAL:   [ ]Normal   [ ]Weakness [ x ]Bed/Wheelchair bound [ x]Edema-right upper and lower extremities  NEUROLOGIC:   [ ]No focal deficits  [ x ] Cognitive impairment  [ x ] Dysphagia [ ]Dysarthria [ x ] Paresis-right side [ ]Other   SKIN:  ecchymoses  [ x ]Normal  [ ]Rash     [ ]Pressure ulcer(s)  [ ]y [ ]n  Present on admission      CRITICAL CARE:  [ ] Shock Present  [ ]Septic [ ]Cardiogenic [ ]Neurologic [ ]Hypovolemic  [ ]  Vasopressors [ ]  Inotropes   [ x ] Respiratory failure present [ ] Mechanical Ventilation [ ] Non-invasive ventilatory support [ ] High-Flow   [ x ] S/P Compassionate extubation 2/16  [ ] Acute  [ ] Chronic [ ] Hypoxic  [ ] Hypercarbic [ ] Other  [ x ] Other organ failure  brain    LABS:  None new    RADIOLOGY & ADDITIONAL STUDIES:  None new    PROTEIN CALORIE MALNUTRITION:   [ x ] PPSV2 < or = 30% [ ] significant weight loss [ ] poor nutritional intake [ ] anasarca [ ] catabolic state   Albumin, Serum: 4.1 g/dL (02-10-20 @ 17:46)   Artificial Nutrition [ ]     REFERRALS:   [ ]Chaplaincy  [ ] Hospice  [ ]Child Life  [ ]Social Work  [ ]Case management [ ]Holistic Therapy [ ] Physical Therapy [ ] Dietary     Goals of Care Document:   Progress Notes - Care Coordination [C. Provider] (02-21-20 @ 16:26) GAP TEAM PALLIATIVE CARE UNIT PROGRESS NOTE:      [  ] Patient on hospice program.    INDICATION FOR PALLIATIVE CARE UNIT SERVICES:  (Transfer from  [facility] ) In the PCU for compassionate extubation in setting of  L MCA infarction    INTERVAL HPI/OVERNIGHT EVENTS:  Patient on a morphine drip.  Patient used no PRN in the last 24hrs.    DNR on chart: Yes    Allergies    No Known Allergies    Intolerances    MEDICATIONS  (STANDING):  clocortolone 0.1% 1 Application(s) 1 Application(s) Topical two times a day  haloperidol    Injectable 1 milliGRAM(s) IV Push every 6 hours  morphine  Infusion 2 mG/Hr (2 mL/Hr) IV Continuous <Continuous>  sodium chloride 0.9%. 1000 milliLiter(s) (10 mL/Hr) IV Continuous <Continuous>    MEDICATIONS  (PRN):  acetaminophen  Suppository .. 650 milliGRAM(s) Rectal every 6 hours PRN Temp greater or equal to 38C (100.4F)  bisacodyl Suppository 10 milliGRAM(s) Rectal daily PRN Constipation  glycopyrrolate Injectable 0.4 milliGRAM(s) IV Push every 6 hours PRN Secretions  haloperidol    Injectable 1 milliGRAM(s) IV Push every 2 hours PRN agitation  LORazepam   Injectable 2 milliGRAM(s) IV Push every 1 hour PRN Agitation/ Agitation  morphine  - Injectable 4 milliGRAM(s) IV Push every 1 hour PRN Pain  morphine  - Injectable 4 milliGRAM(s) IV Push every 1 hour PRN Dyspnea      ITEMS UNCHECKED ARE NOT PRESENT    PRESENT SYMPTOMS:  [ x ]Unable to obtain due to poor mentation   Source if other than patient:  [ ]Family   [ ]Team     Pain: [ ] yes [ ] no  QOL impact -   Location -                    Aggravating factors -  Quality -  Radiation -  Timing-  Severity (0-10 scale):  Minimal acceptable level (0-10 scale):     Dyspnea:                           [ ]Mild [ ]Moderate [ ]Severe  Anxiety:                             [ ]Mild [ ]Moderate [ ]Severe  Fatigue:                             [ ]Mild [ ]Moderate [ ]Severe  Nausea:                             [ ]Mild [ ]Moderate [ ]Severe  Loss of appetite:              [ ]Mild [ ]Moderate [ ]Severe  Constipation:                    [ ]Mild [ ]Moderate [ ]Severe    PAINAD Score: 0    [email or web address] (Ctrl +  left click to view)  		  Other Symptoms:  [ x ]All other review of systems negative     Karnofsky Performance Score/Palliative Performance Status Version 2:         10%         [email or web address]/news/palliative_performance_scale_[email or web address].pdf    PHYSICAL EXAM:  Vital Signs Last 24 Hrs  T(C): 37.5 (23 Feb 2020 08:16), Max: 37.5 (23 Feb 2020 08:16)  T(F): 99.5 (23 Feb 2020 08:16), Max: 99.5 (23 Feb 2020 08:16)  HR: 120 (23 Feb 2020 08:16) (120 - 120)  BP: 155/81 (23 Feb 2020 08:16) (155/81 - 155/81)  BP(mean): --  RR: 20 (23 Feb 2020 08:16) (20 - 20)  SpO2: 90% (23 Feb 2020 08:16) (90% - 90%) I&O's Summary    22 Feb 2020 07:01  -  23 Feb 2020 07:00  --------------------------------------------------------  IN: 0 mL / OUT: 550 mL / NET: -550 mL    GENERAL:  [ ]Alert  [ ]Oriented x   [ x ]Lethargic  [ ]Cachexia  [ x ]Unarousable  [ ]Verbal  [ x ]Non-Verbal  Behavioral:   [ ] Anxiety  [ ] Delirium [ x ] Agitation (intermittent) [ ] Other  HEENT:  [ x ]Normal   [ ]Dry mouth   [ ]ET Tube/Trach  [ ]Oral lesions  PULMONARY:   [ ]Clear [ ]Tachypnea  [ ]Audible excessive secretions   [ x ]Rhonchi        [ ]Right [ ]Left [x ]Bilateral  [ ]Crackles        [ ]Right [ ]Left [ ]Bilateral  [ ]Wheezing     [ ]Right [ ]Left [ ]Bilateral  [ ]Diminshed BS [ ]Right [ ]Left [ ]Bilateral    CARDIOVASCULAR:    [ ]Regular [ x ]Irregular [ ]Tachy  [ ]Fan [ ]Murmur [ x ]Other S1/S2 audible  GASTROINTESTINAL:  [ x ]Soft  [ ]Distended   [ x ]+BS  [x ]Non tender [ ]Tender  [ ]PEG [ ]OGT/ NGT   Last BM:  2/15/20   GENITOURINARY:  [ ]Normal [ x ] Incontinent   [ ]Oliguria/Anuria   [x ]Michael  MUSCULOSKELETAL:   [ ]Normal   [ ]Weakness [ x ]Bed/Wheelchair bound [ x]Edema-right upper and lower extremities  NEUROLOGIC:   [ ]No focal deficits  [ x ] Cognitive impairment  [ x ] Dysphagia [ ]Dysarthria [ x ] Paresis-right side [ ]Other   SKIN:  ecchymoses  [ x ]Normal  [ ]Rash     [ ]Pressure ulcer(s)  [ ]y [ ]n  Present on admission      CRITICAL CARE:  [ ] Shock Present  [ ]Septic [ ]Cardiogenic [ ]Neurologic [ ]Hypovolemic  [ ]  Vasopressors [ ]  Inotropes   [ x ] Respiratory failure present [ ] Mechanical Ventilation [ ] Non-invasive ventilatory support [ ] High-Flow   [ x ] S/P Compassionate extubation 2/16  [ ] Acute  [ ] Chronic [ ] Hypoxic  [ ] Hypercarbic [ ] Other  [ x ] Other organ failure  brain    LABS:  None new    RADIOLOGY & ADDITIONAL STUDIES:  None new    PROTEIN CALORIE MALNUTRITION:   [ x ] PPSV2 < or = 30% [ ] significant weight loss [ ] poor nutritional intake [ ] anasarca [ ] catabolic state   Albumin, Serum: 4.1 g/dL (02-10-20 @ 17:46)   Artificial Nutrition [ ]     REFERRALS:   [ ]Chaplaincy  [ ] Hospice  [ ]Child Life  [ ]Social Work  [ ]Case management [ ]Holistic Therapy [ ] Physical Therapy [ ] Dietary     Goals of Care Document:   Progress Notes - Care Coordination [C. Provider] (02-21-20 @ 16:26)

## 2020-02-23 NOTE — PROGRESS NOTE ADULT - ATTENDING COMMENTS
Patient seen and examined and agree with the above documentation with the following additions:   Patient has not required any PRN doses over last 24 hours  remains comfortable appearing on examination. No family at bedside. continue care in PCU  inpatient hospice evaluation pending.

## 2020-02-23 NOTE — PROGRESS NOTE ADULT - ASSESSMENT
78 year old female presented with unresponsiveness at home. At [place] she was found to have R hemiparesis and global aphasia, consistent with a L MCA syndrome. Her CT head reveals an acute L MCA infarction. 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 Not a candidate for IV tPA due to out of window.  Patient was transferred to the PCU for compassionate extubation which took place on  2/16/20 in the PCU. Patient remains minimally to unresponsive with non-purposeful movements on the left side.

## 2020-02-24 PROCEDURE — 99233 SBSQ HOSP IP/OBS HIGH 50: CPT | Mod: GC

## 2020-02-24 RX ADMIN — MORPHINE SULFATE 4 MILLIGRAM(S): 50 CAPSULE, EXTENDED RELEASE ORAL at 08:09

## 2020-02-24 RX ADMIN — HALOPERIDOL DECANOATE 1 MILLIGRAM(S): 100 INJECTION INTRAMUSCULAR at 05:29

## 2020-02-24 RX ADMIN — SODIUM CHLORIDE 10 MILLILITER(S): 9 INJECTION INTRAMUSCULAR; INTRAVENOUS; SUBCUTANEOUS at 08:03

## 2020-02-24 RX ADMIN — MORPHINE SULFATE 2 MG/HR: 50 CAPSULE, EXTENDED RELEASE ORAL at 20:04

## 2020-02-24 RX ADMIN — HALOPERIDOL DECANOATE 1 MILLIGRAM(S): 100 INJECTION INTRAMUSCULAR at 11:57

## 2020-02-24 RX ADMIN — Medication 650 MILLIGRAM(S): at 17:51

## 2020-02-24 RX ADMIN — HALOPERIDOL DECANOATE 1 MILLIGRAM(S): 100 INJECTION INTRAMUSCULAR at 17:45

## 2020-02-24 RX ADMIN — Medication 650 MILLIGRAM(S): at 08:09

## 2020-02-24 RX ADMIN — HALOPERIDOL DECANOATE 1 MILLIGRAM(S): 100 INJECTION INTRAMUSCULAR at 00:24

## 2020-02-24 RX ADMIN — HALOPERIDOL DECANOATE 1 MILLIGRAM(S): 100 INJECTION INTRAMUSCULAR at 23:47

## 2020-02-24 RX ADMIN — SODIUM CHLORIDE 10 MILLILITER(S): 9 INJECTION INTRAMUSCULAR; INTRAVENOUS; SUBCUTANEOUS at 20:04

## 2020-02-24 RX ADMIN — MORPHINE SULFATE 4 MILLIGRAM(S): 50 CAPSULE, EXTENDED RELEASE ORAL at 14:54

## 2020-02-24 RX ADMIN — Medication 2 MILLIGRAM(S): at 10:34

## 2020-02-24 RX ADMIN — MORPHINE SULFATE 2 MG/HR: 50 CAPSULE, EXTENDED RELEASE ORAL at 14:55

## 2020-02-24 RX ADMIN — MORPHINE SULFATE 2 MG/HR: 50 CAPSULE, EXTENDED RELEASE ORAL at 08:02

## 2020-02-24 NOTE — PROGRESS NOTE ADULT - PROBLEM SELECTOR PLAN 5
Patient with L MCA infarct, S/P palliative Extubation 2/16.  Met with daughter, GILBERTO Wiggins. Supportive care given to daughter as she remains struggling with the acute nature of her mother's CVA and now loss of independence.    Possible transition to hospice. She would like to visit [facility] tomorrow or Saturday, but seemed to feel it would likely be a good transition given the close location to her home.   ENRICO present in chart. Met with daughter, medical resident and PA student.  Daughter continues to have great existential suffering surrounding the suddenness of her mother's illness and the loss of her independence.  Supportive listening provided.

## 2020-02-24 NOTE — PROGRESS NOTE ADULT - ATTENDING COMMENTS
I have personally seen and examined this patient and agree with the above assessment and plan, which I have reviewed and edited where appropriate.       GOC: Symptom management in the setting of acute stroke  Symptoms: Delirium, dyspnea  Disposition: Anticipate patient will not be able to transition to hospice - now febrile, terminal delirium.  Daughter struggles with changes due to acute medical condition, and decisions surrounding end of life.  Supportive listening provided.  Chaplaincy and social work involved.

## 2020-02-24 NOTE — PROGRESS NOTE ADULT - SUBJECTIVE AND OBJECTIVE BOX
GAP TEAM PALLIATIVE CARE UNIT PROGRESS NOTE:      [  ] Patient on hospice program.    INDICATION FOR PALLIATIVE CARE UNIT SERVICES:  (Transfer from  [facility] ) In the PCU for compassionate extubation in setting of  L MCA infarction    INTERVAL HPI/OVERNIGHT EVENTS: Pt remains on morphine gtt at 2mg/hr. No PRNs used in last 24hrs and no signs of pain. Received Tylenol for fever this morning and ativan 2mg x1 this morning .     DNR on chart: Yes    Allergies    No Known Allergies    Intolerances    MEDICATIONS  (STANDING):  clocortolone 0.1% 1 Application(s) 1 Application(s) Topical two times a day  haloperidol    Injectable 1 milliGRAM(s) IV Push every 6 hours  morphine  Infusion 2 mG/Hr (2 mL/Hr) IV Continuous <Continuous>  sodium chloride 0.9%. 1000 milliLiter(s) (10 mL/Hr) IV Continuous <Continuous>    MEDICATIONS  (PRN):  acetaminophen  Suppository .. 650 milliGRAM(s) Rectal every 6 hours PRN Temp greater or equal to 38C (100.4F)  bisacodyl Suppository 10 milliGRAM(s) Rectal daily PRN Constipation  glycopyrrolate Injectable 0.4 milliGRAM(s) IV Push every 6 hours PRN Secretions  haloperidol    Injectable 1 milliGRAM(s) IV Push every 2 hours PRN agitation  LORazepam   Injectable 2 milliGRAM(s) IV Push every 1 hour PRN Agitation/ Agitation  morphine  - Injectable 4 milliGRAM(s) IV Push every 1 hour PRN Pain  morphine  - Injectable 4 milliGRAM(s) IV Push every 1 hour PRN Dyspnea    ITEMS UNCHECKED ARE NOT PRESENT    PRESENT SYMPTOMS: [x]Unable to obtain due to poor mentation   Source if other than patient:  [ ]Family   [ ]Team     Pain: [ ] yes [x] no  QOL impact -   Location -                    Aggravating factors -  Quality -  Radiation -  Timing-  Severity (0-10 scale):  Minimal acceptable level (0-10 scale):     Dyspnea:                           [ ]Mild [ ]Moderate [ ]Severe  Anxiety:                             [ ]Mild [ ]Moderate [ ]Severe  Fatigue:                             [ ]Mild [ ]Moderate [ ]Severe  Nausea:                             [ ]Mild [ ]Moderate [ ]Severe  Loss of appetite:              [ ]Mild [ ]Moderate [ ]Severe  Constipation:                    [ ]Mild [ ]Moderate [ ]Severe    PAINAD Score:    [email or web address]/[email or web address] (Ctrl +  left click to view)  		  Other Symptoms:  Unable to be assessed      Palliative Performance Status Version 2:         %         [email or web address]/files/news/palliative_performance_scale_[email or web address].pdf  PHYSICAL EXAM:  Vital Signs Last 24 Hrs  T(C): 37.9 (24 Feb 2020 07:17), Max: 38.1 (23 Feb 2020 14:00)  T(F): 100.2 (24 Feb 2020 07:17), Max: 100.5 (23 Feb 2020 14:00)  HR: 126 (24 Feb 2020 07:17) (126 - 126)  BP: 151/77 (24 Feb 2020 07:17) (151/77 - 151/77)  BP(mean): --  RR: 20 (24 Feb 2020 07:17) (20 - 20)  SpO2: 86% (24 Feb 2020 07:17) (86% - 86%) I&O's Summary    23 Feb 2020 07:01  -  24 Feb 2020 07:00  --------------------------------------------------------  IN: 0 mL / OUT: 0 mL / NET: 0 mL    GENERAL:  [ ]Alert  [ ]Oriented x   [ x ]Lethargic  [ ]Cachexia  [ x ]Unarousable  [ ]Verbal  [ x ]Non-Verbal  Behavioral:   [ ] Anxiety  [ ] Delirium [ x ] Agitation (intermittent) [ ] Other  HEENT:  [ x ]Normal   [ ]Dry mouth   [ ]ET Tube/Trach  [ ]Oral lesions  PULMONARY:   [ ]Clear [ ]Tachypnea  [ ]Audible excessive secretions   [ x ]Rhonchi        [ ]Right [ ]Left [x ]Bilateral  [ ]Crackles        [ ]Right [ ]Left [ ]Bilateral  [ ]Wheezing     [ ]Right [ ]Left [ ]Bilateral  [ ]Diminshed BS [ ]Right [ ]Left [ ]Bilateral    CARDIOVASCULAR:    [ ]Regular [ x ]Irregular [ ]Tachy  [ ]Fan [ ]Murmur [ x ]Other S1/S2 audible  GASTROINTESTINAL:  [ x ]Soft  [ ]Distended   [ x ]+BS  [x ]Non tender [ ]Tender  [ ]PEG [ ]OGT/ NGT   Last BM:  2/15/20   GENITOURINARY:  [ ]Normal [ x ] Incontinent   [ ]Oliguria/Anuria   [x ]Michael  MUSCULOSKELETAL:   [ ]Normal   [ ]Weakness [ x ]Bed/Wheelchair bound [ x]Edema-right upper and lower extremities  NEUROLOGIC:   [ ]No focal deficits  [ x ] Cognitive impairment  [ x ] Dysphagia [ ]Dysarthria [ x ] Paresis-right side [ ]Other   SKIN:  ecchymoses  [ x ]Normal  [ ]Rash     [ ]Pressure ulcer(s)  [ ]y [ ]n  Present on admission      CRITICAL CARE:  [ ] Shock Present  [ ]Septic [ ]Cardiogenic [ ]Neurologic [ ]Hypovolemic  [ ]  Vasopressors [ ]  Inotropes   [ ] Respiratory failure present [ ] Mechanical Ventilation [ ] Non-invasive ventilatory support [ ] High-Flow  [ ] Acute  [ ] Chronic [ ] Hypoxic  [ ] Hypercarbic [ ] Other  [ ] Other organ failure     LABS:    No labs        RADIOLOGY & ADDITIONAL STUDIES:    PROTEIN CALORIE MALNUTRITION: [ ] mild [ ] moderate [ ] severe  [ ] underweight [ ] morbid obesity    https://www.andeal.org/vault/2440/web/files/ONC/Table_Clinical%20Characteristics%20to%20Document%20Malnutrition-White%20JV%20et%20al%628337.pdf    Height (cm): 167.6 (02-11-20 @ 01:18), 160.02 (02-10-20 @ 16:50)  Weight (kg): 67.6 (02-11-20 @ 01:18), 71 (02-10-20 @ 16:50)  BMI (kg/m2): 24.1 (02-11-20 @ 01:18), 27.7 (02-10-20 @ 16:50)    [ ] PPSV2 < or = 30% [ ] significant weight loss [ ] poor nutritional intake [ ] anasarca Albumin, Serum: 4.1 g/dL (02-10-20 @ 17:46)    Artificial Nutrition [ ]     REFERRALS:   [ ]Chaplaincy  [ ] Hospice  [ ]Child Life  [ ]Social Work  [ ]Case management [ ]Holistic Therapy [ ] Physical Therapy [ ] Dietary   Goals of Care Document: GAP TEAM PALLIATIVE CARE UNIT PROGRESS NOTE:      [  ] Patient on hospice program.    INDICATION FOR PALLIATIVE CARE UNIT SERVICES:  (Transfer from  [facility] ) In the PCU for symptom management s/p compassionate extubation in setting of  L MCA infarction    INTERVAL HPI/OVERNIGHT EVENTS: Pt remains on morphine gtt at 2mg/hr. No PRNs used in last 24hrs and no signs of pain. Received Tylenol for fever this morning and ativan 2mg x1 this morning .     DNR on chart: Yes    Allergies    No Known Allergies    Intolerances    MEDICATIONS  (STANDING):  clocortolone 0.1% 1 Application(s) 1 Application(s) Topical two times a day  haloperidol    Injectable 1 milliGRAM(s) IV Push every 6 hours  morphine  Infusion 2 mG/Hr (2 mL/Hr) IV Continuous <Continuous>  sodium chloride 0.9%. 1000 milliLiter(s) (10 mL/Hr) IV Continuous <Continuous>    MEDICATIONS  (PRN):  acetaminophen  Suppository .. 650 milliGRAM(s) Rectal every 6 hours PRN Temp greater or equal to 38C (100.4F)  bisacodyl Suppository 10 milliGRAM(s) Rectal daily PRN Constipation  glycopyrrolate Injectable 0.4 milliGRAM(s) IV Push every 6 hours PRN Secretions  haloperidol    Injectable 1 milliGRAM(s) IV Push every 2 hours PRN agitation  LORazepam   Injectable 2 milliGRAM(s) IV Push every 1 hour PRN Agitation/ Agitation  morphine  - Injectable 4 milliGRAM(s) IV Push every 1 hour PRN Pain  morphine  - Injectable 4 milliGRAM(s) IV Push every 1 hour PRN Dyspnea    ITEMS UNCHECKED ARE NOT PRESENT    PRESENT SYMPTOMS: [x]Unable to obtain due to poor mentation from stroke  Source if other than patient:  [ ]Family   [ ]Team     Pain: [ ] yes [x] no  QOL impact -   Location -                    Aggravating factors -  Quality -  Radiation -  Timing-  Severity (0-10 scale):  Minimal acceptable level (0-10 scale):     Dyspnea:                           [ ]Mild [ ]Moderate [ ]Severe  Anxiety:                             [ ]Mild [ ]Moderate [ ]Severe  Fatigue:                             [ ]Mild [ ]Moderate [ ]Severe  Nausea:                             [ ]Mild [ ]Moderate [ ]Severe  Loss of appetite:              [ ]Mild [ ]Moderate [ ]Severe  Constipation:                    [ ]Mild [ ]Moderate [ ]Severe    PAINAD Score: 0    [email or web address] (Ctrl +  left click to view)  		  Other Symptoms:  Unable to be assessed      Palliative Performance Status Version 2:    10     %         [email or web address]/files/[email or web address]_performance_scale_[email or web address].pdf  PHYSICAL EXAM:  Vital Signs Last 24 Hrs  T(C): 37.9 (24 Feb 2020 07:17), Max: 38.1 (23 Feb 2020 14:00)  T(F): 100.2 (24 Feb 2020 07:17), Max: 100.5 (23 Feb 2020 14:00)  HR: 126 (24 Feb 2020 07:17) (126 - 126)  BP: 151/77 (24 Feb 2020 07:17) (151/77 - 151/77)  BP(mean): --  RR: 20 (24 Feb 2020 07:17) (20 - 20)  SpO2: 86% (24 Feb 2020 07:17) (86% - 86%) I&O's Summary    23 Feb 2020 07:01  -  24 Feb 2020 07:00  --------------------------------------------------------  IN: 0 mL / OUT: 0 mL / NET: 0 mL    GENERAL:  [ ]Alert  [ ]Oriented x   [ x ]Lethargic  [ ]Cachexia  [ x ]Unarousable  [ ]Verbal  [ x ]Non-Verbal  Behavioral:   [ ] Anxiety  [ ] Delirium [ x ] Agitation (intermittent) [ ] Other  HEENT:  [ x ]Normal   [ ]Dry mouth   [ ]ET Tube/Trach  [ ]Oral lesions  PULMONARY:   [ ]Clear [ ]Tachypnea  [ ]Audible excessive secretions   [ x ]Rhonchi        [ ]Right [ ]Left [x ]Bilateral  [ ]Crackles        [ ]Right [ ]Left [ ]Bilateral  [ ]Wheezing     [ ]Right [ ]Left [ ]Bilateral  [ ]Diminshed BS [ ]Right [ ]Left [ ]Bilateral    CARDIOVASCULAR:    [ ]Regular [ x ]Irregular [ ]Tachy  [ ]Fan [ ]Murmur [ x ]Other S1/S2 audible  GASTROINTESTINAL:  [ x ]Soft  [ ]Distended   [ x ]+BS  [x ]Non tender [ ]Tender  [ ]PEG [ ]OGT/ NGT   Last BM:  2/15/20   GENITOURINARY:  [ ]Normal [ x ] Incontinent   [ ]Oliguria/Anuria   [x ]Michael  MUSCULOSKELETAL:   [ ]Normal   [ ]Weakness [ x ]Bed/Wheelchair bound [ x]Edema-right upper and lower extremities  NEUROLOGIC:   [ ]No focal deficits  [ x ] Cognitive impairment  [ x ] Dysphagia [ ]Dysarthria [ x ] Paresis-right side [ ]Other   SKIN:  ecchymoses  [ x ]Normal  [ ]Rash     [ ]Pressure ulcer(s)  [ ]y [ ]n  Present on admission      CRITICAL CARE:  [ ] Shock Present  [ ]Septic [ ]Cardiogenic [ ]Neurologic [ ]Hypovolemic  [ ]  Vasopressors [ ]  Inotropes   [ ] Respiratory failure present [ ] Mechanical Ventilation [ ] Non-invasive ventilatory support [ ] High-Flow  [ ] Acute  [ ] Chronic [ ] Hypoxic  [ ] Hypercarbic [ ] Other  [ ] Other organ failure  brain    LABS:    No labs        RADIOLOGY & ADDITIONAL STUDIES: None new.     PROTEIN CALORIE MALNUTRITION: [ ] mild [ ] moderate [ ] severe  [ ] underweight [ ] morbid obesity    https://www.andeal.org/vault/2440/web/files/ONC/Table_Clinical%20Characteristics%20to%20Document%20Malnutrition-White%20JV%20et%20al%913133.pdf    Height (cm): 167.6 (02-11-20 @ 01:18), 160.02 (02-10-20 @ 16:50)  Weight (kg): 67.6 (02-11-20 @ 01:18), 71 (02-10-20 @ 16:50)  BMI (kg/m2): 24.1 (02-11-20 @ 01:18), 27.7 (02-10-20 @ 16:50)    [x ] PPSV2 < or = 30% [ ] significant weight loss [x ] poor nutritional intake [ ] anasarca Albumin, Serum: 4.1 g/dL (02-10-20 @ 17:46)    Artificial Nutrition [ ]     REFERRALS:   [ ]Chaplaincy  [ x] Hospice  [ ]Child Life  [x ]Social Work  [ ]Case management [ ]Holistic Therapy [ ] Physical Therapy [ ] Dietary   Goals of Care Document:

## 2020-02-24 NOTE — PROGRESS NOTE ADULT - ASSESSMENT
78 year old female presented with unresponsiveness at home. At [place] she was found to have R hemiparesis and global aphasia, consistent with a L MCA syndrome. Her CT head reveals an acute L MCA infarction. 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 Not a candidate for IV tPA due to out of window.  Patient was transferred to the PCU for compassionate extubation which took place on  2/16/20 in the PCU. Patient remains minimally to unresponsive with non-purposeful movements on the left side.

## 2020-02-24 NOTE — PROGRESS NOTE ADULT - PROBLEM SELECTOR PLAN 3
Secondary to delirium in the setting of acute stroke.   Ativan prn in addition to ATC dosing.      Haldol 1 mg ordered IV q 6 ATC and 1 mg prn q 2.   Will continue to monitor effectiveness and titrate medication as needed. Secondary to delirium, now terminal in nature.  Ativan prn in addition to ATC dosing.      Haldol 1 mg ordered IV q 6 ATC and 1 mg prn q 2.   Will continue to monitor effectiveness and titrate medication as needed.

## 2020-02-24 NOTE — CHART NOTE - NSCHARTNOTEFT_GEN_A_CORE
Nutrition Follow Up     Pt inappropriate for follow up at this time per RN.    RD remains available.   Denae Ross MS RD CDN [facility],  #[phone]

## 2020-02-24 NOTE — PROGRESS NOTE ADULT - PROBLEM SELECTOR PLAN 2
S/p compassionate extubation in the PCU 2/16/20.    No PRN in the past 24 hours.   Morphine drip 2 mg/hour and prn doses remain at 4 mg. S/p compassionate extubation in the PCU 2/16/20.    No PRNs in the past 24 hours.   Morphine drip 2 mg/hour and prn doses remain at 4 mg.

## 2020-02-25 PROCEDURE — 99232 SBSQ HOSP IP/OBS MODERATE 35: CPT

## 2020-02-25 RX ORDER — MORPHINE SULFATE 50 MG/1
2 CAPSULE, EXTENDED RELEASE ORAL
Qty: 100 | Refills: 0 | Status: DISCONTINUED | OUTPATIENT
Start: 2020-02-25 | End: 2020-02-29

## 2020-02-25 RX ORDER — MORPHINE SULFATE 50 MG/1
4 CAPSULE, EXTENDED RELEASE ORAL
Refills: 0 | Status: DISCONTINUED | OUTPATIENT
Start: 2020-02-25 | End: 2020-02-29

## 2020-02-25 RX ADMIN — Medication 650 MILLIGRAM(S): at 18:41

## 2020-02-25 RX ADMIN — HALOPERIDOL DECANOATE 1 MILLIGRAM(S): 100 INJECTION INTRAMUSCULAR at 11:31

## 2020-02-25 RX ADMIN — Medication 10 MILLIGRAM(S): at 16:58

## 2020-02-25 RX ADMIN — Medication 2 MILLIGRAM(S): at 01:52

## 2020-02-25 RX ADMIN — Medication 650 MILLIGRAM(S): at 08:30

## 2020-02-25 RX ADMIN — SODIUM CHLORIDE 10 MILLILITER(S): 9 INJECTION INTRAMUSCULAR; INTRAVENOUS; SUBCUTANEOUS at 19:30

## 2020-02-25 RX ADMIN — Medication 650 MILLIGRAM(S): at 23:55

## 2020-02-25 RX ADMIN — MORPHINE SULFATE 2 MG/HR: 50 CAPSULE, EXTENDED RELEASE ORAL at 16:46

## 2020-02-25 RX ADMIN — Medication 650 MILLIGRAM(S): at 16:58

## 2020-02-25 RX ADMIN — HALOPERIDOL DECANOATE 1 MILLIGRAM(S): 100 INJECTION INTRAMUSCULAR at 21:36

## 2020-02-25 RX ADMIN — Medication 650 MILLIGRAM(S): at 05:09

## 2020-02-25 RX ADMIN — HALOPERIDOL DECANOATE 1 MILLIGRAM(S): 100 INJECTION INTRAMUSCULAR at 05:07

## 2020-02-25 RX ADMIN — HALOPERIDOL DECANOATE 1 MILLIGRAM(S): 100 INJECTION INTRAMUSCULAR at 18:44

## 2020-02-25 RX ADMIN — MORPHINE SULFATE 4 MILLIGRAM(S): 50 CAPSULE, EXTENDED RELEASE ORAL at 11:31

## 2020-02-25 RX ADMIN — MORPHINE SULFATE 2 MG/HR: 50 CAPSULE, EXTENDED RELEASE ORAL at 19:29

## 2020-02-25 RX ADMIN — Medication 650 MILLIGRAM(S): at 22:47

## 2020-02-25 NOTE — PROGRESS NOTE ADULT - ATTENDING COMMENTS
I have personally seen and examined this patient and agree with the above assessment and plan, which I have reviewed and edited where appropriate.     GOC: Symptom management in the setting of L MCA CVA  Symptoms: Delirium, dyspnea, fever, hypoxia  Disposition: Ongoing care in the PCU - spiking fevers, oxygen saturation dropping.   Daughter educated as to what to expect.  Questions answered.  Emotional support provided.

## 2020-02-25 NOTE — PROGRESS NOTE ADULT - SUBJECTIVE AND OBJECTIVE BOX
GAP TEAM PALLIATIVE CARE UNIT PROGRESS NOTE:      [  ] Patient on hospice program.    INDICATION FOR PALLIATIVE CARE UNIT SERVICES:  (Transfer from  [facility] ) In the PCU for symptom management s/p compassionate extubation in setting of  L MCA infarction    INTERVAL HPI/OVERNIGHT EVENTS: Yesterday afternoon pt received dose of ativan for agitation. Received Tylenol for fever of 101.2 early this morning.     DNR on chart: Yes    Allergies    No Known Allergies    Intolerances    MEDICATIONS  (STANDING):  clocortolone 0.1% 1 Application(s) 1 Application(s) Topical two times a day  haloperidol    Injectable 1 milliGRAM(s) IV Push every 6 hours  morphine  Infusion 2 mG/Hr (2 mL/Hr) IV Continuous <Continuous>  sodium chloride 0.9%. 1000 milliLiter(s) (10 mL/Hr) IV Continuous <Continuous>    MEDICATIONS  (PRN):  acetaminophen  Suppository .. 650 milliGRAM(s) Rectal every 6 hours PRN Temp greater or equal to 38C (100.4F)  bisacodyl Suppository 10 milliGRAM(s) Rectal daily PRN Constipation  glycopyrrolate Injectable 0.4 milliGRAM(s) IV Push every 6 hours PRN Secretions  haloperidol    Injectable 1 milliGRAM(s) IV Push every 2 hours PRN agitation  LORazepam   Injectable 2 milliGRAM(s) IV Push every 1 hour PRN Agitation/ Agitation  morphine  - Injectable 4 milliGRAM(s) IV Push every 1 hour PRN Pain  morphine  - Injectable 4 milliGRAM(s) IV Push every 1 hour PRN Dyspnea    ITEMS UNCHECKED ARE NOT PRESENT    PRESENT SYMPTOMS: [x]Unable to obtain due to poor mentation   Source if other than patient:  [ ]Family   [ ]Team     Pain: [ ] yes [x] no  QOL impact -   Location -                    Aggravating factors -  Quality -  Radiation -  Timing-  Severity (0-10 scale):  Minimal acceptable level (0-10 scale):     Dyspnea:                           [ ]Mild [ ]Moderate [ ]Severe  Anxiety:                             [ ]Mild [ ]Moderate [ ]Severe  Fatigue:                             [ ]Mild [ ]Moderate [ ]Severe  Nausea:                             [ ]Mild [ ]Moderate [ ]Severe  Loss of appetite:              [ ]Mild [ ]Moderate [ ]Severe  Constipation:                    [ ]Mild [ ]Moderate [ ]Severe    PAINAD Score:    [email or web address] (Ctrl +  left click to view)  		  Other Symptoms:  [ ]All other review of systems negative     Palliative Performance Status Version 2:         %         http://npcrc.org/files/news/palliative_performance_scale_[email or web address].pdf  PHYSICAL EXAM:  Vital Signs Last 24 Hrs  T(C): 37.4 (25 Feb 2020 08:18), Max: 38.4 (25 Feb 2020 05:00)  T(F): 99.3 (25 Feb 2020 08:18), Max: 101.2 (25 Feb 2020 05:00)  HR: 138 (25 Feb 2020 08:18) (138 - 138)  BP: 114/56 (25 Feb 2020 08:18) (114/56 - 114/56)  BP(mean): --  RR: 20 (25 Feb 2020 08:18) (20 - 20)  SpO2: 91% (25 Feb 2020 08:18) (91% - 91%) I&O's Summary    24 Feb 2020 07:01  -  25 Feb 2020 07:00  --------------------------------------------------------  IN: 0 mL / OUT: 200 mL / NET: -200 mL    GENERAL:  [ ] Anxiety  [ ] Delirium [ x ] Agitation (intermittent) [ ] Other  HEENT:  [ x ]Normal   [ ]Dry mouth   [ ]ET Tube/Trach  [ ]Oral lesions  PULMONARY:   [ ]Clear [ ]Tachypnea  [ ]Audible excessive secretions   [ x ]Rhonchi        [ ]Right [ ]Left [x ]Bilateral  [ ]Crackles        [ ]Right [ ]Left [ ]Bilateral  [ ]Wheezing     [ ]Right [ ]Left [ ]Bilateral  [ ]Diminshed BS [ ]Right [ ]Left [ ]Bilateral    CARDIOVASCULAR:    [ ]Regular [ x ]Irregular [ ]Tachy  [ ]Fan [ ]Murmur [ x ]Other S1/S2 audible  GASTROINTESTINAL:  [ x ]Soft  [ ]Distended   [ x ]+BS  [x ]Non tender [ ]Tender  [ ]PEG [ ]OGT/ NGT   Last BM:  2/15/20   GENITOURINARY:  [ ]Normal [ x ] Incontinent   [ ]Oliguria/Anuria   [x ]Michael  MUSCULOSKELETAL:   [ ]Normal   [ ]Weakness [ x ]Bed/Wheelchair bound [ x]Edema-right upper and lower extremities  NEUROLOGIC:   [ ]No focal deficits  [ x ] Cognitive impairment  [ x ] Dysphagia [ ]Dysarthria [ x ] Paresis-right side [ ]Other   SKIN:  ecchymoses  [ x ]Normal  [ ]Rash     [ ]Pressure ulcer(s)  [ ]y [ ]n  Present on admission      CRITICAL CARE:  [ ] Shock Present  [ ]Septic [ ]Cardiogenic [ ]Neurologic [ ]Hypovolemic  [ ]  Vasopressors [ ]  Inotropes   [ ] Respiratory failure present [ ] Mechanical Ventilation [ ] Non-invasive ventilatory support [ ] High-Flow  [ ] Acute  [ ] Chronic [ ] Hypoxic  [ ] Hypercarbic [ ] Other  [ ] Other organ failure     LABS:            RADIOLOGY & ADDITIONAL STUDIES:    PROTEIN CALORIE MALNUTRITION: [ ] mild [ ] moderate [ ] severe  [ ] underweight [ ] morbid obesity    https://www.andeal.org/vault/2440/web/files/ONC/Table_Clinical%20Characteristics%20to%20Document%20Malnutrition-White%20JV%20et%20al%422957.pdf    Height (cm): 167.6 (02-11-20 @ 01:18), 160.02 (02-10-20 @ 16:50)  Weight (kg): 67.6 (02-11-20 @ 01:18), 71 (02-10-20 @ 16:50)  BMI (kg/m2): 24.1 (02-11-20 @ 01:18), 27.7 (02-10-20 @ 16:50)    [ ] PPSV2 < or = 30% [ ] significant weight loss [ ] poor nutritional intake [ ] anasarca Albumin, Serum: 4.1 g/dL (02-10-20 @ 17:46)    Artificial Nutrition [ ]     REFERRALS:   [ ]Chaplaincy  [x] Hospice  [ ]Child Life  [ ]Social Work  [ ]Case management [ ]Holistic Therapy [ ] Physical Therapy [ ] Dietary   Goals of Care Document: GAP TEAM PALLIATIVE CARE UNIT PROGRESS NOTE:      [  ] Patient on hospice program.    INDICATION FOR PALLIATIVE CARE UNIT SERVICES:  (Transfer from  [facility] ) In the PCU for symptom management s/p compassionate extubation in setting of  L MCA infarction    INTERVAL HPI/OVERNIGHT EVENTS: Yesterday afternoon pt received dose of ativan for agitation. Received Tylenol for fever of 101.2 early this morning.     DNR on chart: Yes    Allergies    No Known Allergies    Intolerances    MEDICATIONS  (STANDING):  clocortolone 0.1% 1 Application(s) 1 Application(s) Topical two times a day  haloperidol    Injectable 1 milliGRAM(s) IV Push every 6 hours  morphine  Infusion 2 mG/Hr (2 mL/Hr) IV Continuous <Continuous>  sodium chloride 0.9%. 1000 milliLiter(s) (10 mL/Hr) IV Continuous <Continuous>    MEDICATIONS  (PRN):  acetaminophen  Suppository .. 650 milliGRAM(s) Rectal every 6 hours PRN Temp greater or equal to 38C (100.4F)  bisacodyl Suppository 10 milliGRAM(s) Rectal daily PRN Constipation  glycopyrrolate Injectable 0.4 milliGRAM(s) IV Push every 6 hours PRN Secretions  haloperidol    Injectable 1 milliGRAM(s) IV Push every 2 hours PRN agitation  LORazepam   Injectable 2 milliGRAM(s) IV Push every 1 hour PRN Agitation/ Agitation  morphine  - Injectable 4 milliGRAM(s) IV Push every 1 hour PRN Pain  morphine  - Injectable 4 milliGRAM(s) IV Push every 1 hour PRN Dyspnea    ITEMS UNCHECKED ARE NOT PRESENT    PRESENT SYMPTOMS: [x]Unable to obtain due to poor mentation from L MCA CVA  Source if other than patient:  [ ]Family   [ ]Team     Pain: [ ] yes [x] no  QOL impact -   Location -                    Aggravating factors -  Quality -  Radiation -  Timing-  Severity (0-10 scale):  Minimal acceptable level (0-10 scale):     Dyspnea:                           [ ]Mild [ ]Moderate [ ]Severe  Anxiety:                             [ ]Mild [ ]Moderate [ ]Severe  Fatigue:                             [ ]Mild [ ]Moderate [ ]Severe  Nausea:                             [ ]Mild [ ]Moderate [ ]Severe  Loss of appetite:              [ ]Mild [ ]Moderate [ ]Severe  Constipation:                    [ ]Mild [ ]Moderate [ ]Severe    PAINAD Score: 0    [email or web address].[email or web address]/cog/painad.pdf (Ctrl +  left click to view)  		  Other Symptoms:  [ ]All other review of systems negative     Palliative Performance Status Version 2:  10       %         http://[facility] Hospitalrc.org/files/news/palliative_performance_scale_[email or web address].pdf  PHYSICAL EXAM:  Vital Signs Last 24 Hrs  T(C): 37.4 (25 Feb 2020 08:18), Max: 38.4 (25 Feb 2020 05:00)  T(F): 99.3 (25 Feb 2020 08:18), Max: 101.2 (25 Feb 2020 05:00)  HR: 138 (25 Feb 2020 08:18) (138 - 138)  BP: 114/56 (25 Feb 2020 08:18) (114/56 - 114/56)  BP(mean): --  RR: 20 (25 Feb 2020 08:18) (20 - 20)  SpO2: 91% (25 Feb 2020 08:18) (91% - 91%) I&O's Summary    24 Feb 2020 07:01  -  25 Feb 2020 07:00  --------------------------------------------------------  IN: 0 mL / OUT: 200 mL / NET: -200 mL    GENERAL:  [ ] Anxiety  [ ] Delirium [ x ] Agitation (intermittent) [ ] Other  HEENT:  [ x ]Normal   [ ]Dry mouth   [ ]ET Tube/Trach  [ ]Oral lesions  PULMONARY:   [ ]Clear [ ]Tachypnea  [ ]Audible excessive secretions   [ x ]Rhonchi        [ ]Right [ ]Left [x ]Bilateral  [ ]Crackles        [ ]Right [ ]Left [ ]Bilateral  [ ]Wheezing     [ ]Right [ ]Left [ ]Bilateral  [ ]Diminshed BS [ ]Right [ ]Left [ ]Bilateral    CARDIOVASCULAR:    [ ]Regular [ x ]Irregular [ ]Tachy  [ ]Fan [ ]Murmur [ x ]Other S1/S2 audible  GASTROINTESTINAL:  [ x ]Soft  [ ]Distended   [ x ]+BS  [x ]Non tender [ ]Tender  [ ]PEG [ ]OGT/ NGT   Last BM:  2/15/20   GENITOURINARY:  [ ]Normal [ x ] Incontinent   [ ]Oliguria/Anuria   [x ]Michael  MUSCULOSKELETAL:   [ ]Normal   [ ]Weakness [ x ]Bed/Wheelchair bound [ x]Edema-right upper and lower extremities  NEUROLOGIC:   [ ]No focal deficits  [ x ] Cognitive impairment  [ x ] Dysphagia [ ]Dysarthria [ x ] Paresis-right side [ ]Other   SKIN:  ecchymoses  [ x ]Normal  [ ]Rash     [ ]Pressure ulcer(s)  [ ]y [ ]n  Present on admission      CRITICAL CARE:  [ ] Shock Present  [ ]Septic [ ]Cardiogenic [ ]Neurologic [ ]Hypovolemic  [ ]  Vasopressors [ ]  Inotropes   [ ] Respiratory failure present [ ] Mechanical Ventilation [ ] Non-invasive ventilatory support [ ] High-Flow  [ ] Acute  [ ] Chronic [ ] Hypoxic  [ ] Hypercarbic [ ] Other  [x ] Other organ failure brain    LABS: None new.             RADIOLOGY & ADDITIONAL STUDIES: None new.     PROTEIN CALORIE MALNUTRITION: [ ] mild [ ] moderate [ ] severe  [ ] underweight [ ] morbid obesity    https://www.andeal.org/vault/2440/web/files/ONC/Table_Clinical%20Characteristics%20to%20Document%20Malnutrition-White%20JV%20et%20al%059031.pdf    Height (cm): 167.6 (02-11-20 @ 01:18), 160.02 (02-10-20 @ 16:50)  Weight (kg): 67.6 (02-11-20 @ 01:18), 71 (02-10-20 @ 16:50)  BMI (kg/m2): 24.1 (02-11-20 @ 01:18), 27.7 (02-10-20 @ 16:50)    [ ] PPSV2 < or = 30% [ ] significant weight loss [ ] poor nutritional intake [ ] anasarca Albumin, Serum: 4.1 g/dL (02-10-20 @ 17:46)    Artificial Nutrition [ ]     REFERRALS:   [ ]Chaplaincy  [x] Hospice  [ ]Child Life  [ ]Social Work  [ ]Case management [ ]Holistic Therapy [ ] Physical Therapy [ ] Dietary   Goals of Care Document:

## 2020-02-25 NOTE — PROGRESS NOTE ADULT - ASSESSMENT
78 year old female presented with unresponsiveness at home. At [place] she was found to have R hemiparesis and global aphasia, consistent with a L MCA syndrome. Her CT head reveals an acute L MCA infarction. 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 Not a candidate for IV tPA due to out of window.  Patient was transferred to the PCU for compassionate extubation which took place on  2/16/20 in the PCU. Patient remains minimally to unresponsive with non-purposeful movements on the left side.

## 2020-02-25 NOTE — PROGRESS NOTE ADULT - PROBLEM SELECTOR PLAN 2
S/p compassionate extubation in the PCU 2/16/20.    No PRNs in the past 24 hours.   Morphine drip 2 mg/hour and prn doses remain at 4 mg.

## 2020-02-25 NOTE — PROGRESS NOTE ADULT - PROBLEM SELECTOR PLAN 3
Secondary to delirium, now terminal in nature.  Ativan prn in addition to ATC dosing.      Haldol 1 mg ordered IV q 6 ATC and 1 mg prn q 2.   Will continue to monitor effectiveness and titrate medication as needed.

## 2020-02-26 PROCEDURE — 99232 SBSQ HOSP IP/OBS MODERATE 35: CPT | Mod: GC

## 2020-02-26 RX ADMIN — MORPHINE SULFATE 2 MG/HR: 50 CAPSULE, EXTENDED RELEASE ORAL at 23:34

## 2020-02-26 RX ADMIN — Medication 2 MILLIGRAM(S): at 16:34

## 2020-02-26 RX ADMIN — MORPHINE SULFATE 2 MG/HR: 50 CAPSULE, EXTENDED RELEASE ORAL at 18:30

## 2020-02-26 RX ADMIN — HALOPERIDOL DECANOATE 1 MILLIGRAM(S): 100 INJECTION INTRAMUSCULAR at 18:30

## 2020-02-26 RX ADMIN — HALOPERIDOL DECANOATE 1 MILLIGRAM(S): 100 INJECTION INTRAMUSCULAR at 05:31

## 2020-02-26 RX ADMIN — HALOPERIDOL DECANOATE 1 MILLIGRAM(S): 100 INJECTION INTRAMUSCULAR at 13:13

## 2020-02-26 RX ADMIN — SODIUM CHLORIDE 10 MILLILITER(S): 9 INJECTION INTRAMUSCULAR; INTRAVENOUS; SUBCUTANEOUS at 23:34

## 2020-02-26 RX ADMIN — MORPHINE SULFATE 2 MG/HR: 50 CAPSULE, EXTENDED RELEASE ORAL at 13:11

## 2020-02-26 RX ADMIN — SODIUM CHLORIDE 10 MILLILITER(S): 9 INJECTION INTRAMUSCULAR; INTRAVENOUS; SUBCUTANEOUS at 07:30

## 2020-02-26 RX ADMIN — HALOPERIDOL DECANOATE 1 MILLIGRAM(S): 100 INJECTION INTRAMUSCULAR at 00:09

## 2020-02-26 NOTE — PROGRESS NOTE ADULT - PROBLEM SELECTOR PLAN 5
Daughter continues to have great existential suffering surrounding the suddenness of her mother's illness and the loss of her independence.  Supportive listening provided. Daughter continues to have great existential suffering surrounding the suddenness of her mother's illness and the loss of her independence.  Supportive listening provided. Chaplaincy and social work following.

## 2020-02-26 NOTE — PROGRESS NOTE ADULT - PROBLEM SELECTOR PLAN 3
Secondary to delirium, now terminal in nature.  Ativan prn in addition to ATC dosing.      Haldol 1 mg ordered IV q 6 ATC and 1 mg prn q 2.   Will continue to monitor effectiveness and titrate medication as needed. Secondary to delirium, now terminal in nature.  Ativan prn in addition to ATC dosing.   Haldol 1 mg ordered IV q 6 ATC and 1 mg prn q 2.   Will continue to monitor effectiveness and titrate medication as needed.

## 2020-02-26 NOTE — PROGRESS NOTE ADULT - ASSESSMENT
78 year old female presented with unresponsiveness at home. At [place] she was found to have R hemiparesis and global aphasia, consistent with a L MCA syndrome. Her CT head reveals an acute L MCA infarction. 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 Not a candidate for IV tPA due to out of window.  Patient was transferred to the PCU for compassionate extubation which took place on  2/16/20 in the PCU. Patient remains minimally to unresponsive with non-purposeful movements on the left side.

## 2020-02-26 NOTE — PROGRESS NOTE ADULT - SUBJECTIVE AND OBJECTIVE BOX
GAP TEAM PALLIATIVE CARE UNIT PROGRESS NOTE:      [  ] Patient on hospice program.    INDICATION FOR PALLIATIVE CARE UNIT SERVICES:  (Transfer from  [facility] ) In the PCU for symptom management s/p compassionate extubation in setting of  L MCA infarction    INTERVAL HPI/OVERNIGHT EVENTS: Overnight pt received Tylenol for continued fevers. She also received haldol PRN x1 for agitation. Vitals also notable for increasing tachycardia and now developing hypotension.     DNR on chart: Yes    Allergies    No Known Allergies    Intolerances    MEDICATIONS  (STANDING):  clocortolone 0.1% 1 Application(s) 1 Application(s) Topical two times a day  haloperidol    Injectable 1 milliGRAM(s) IV Push every 6 hours  morphine  Infusion 2 mG/Hr (2 mL/Hr) IV Continuous <Continuous>  sodium chloride 0.9%. 1000 milliLiter(s) (10 mL/Hr) IV Continuous <Continuous>    MEDICATIONS  (PRN):  acetaminophen  Suppository .. 650 milliGRAM(s) Rectal every 6 hours PRN Temp greater or equal to 38C (100.4F)  bisacodyl Suppository 10 milliGRAM(s) Rectal daily PRN Constipation  glycopyrrolate Injectable 0.4 milliGRAM(s) IV Push every 6 hours PRN Secretions  haloperidol    Injectable 1 milliGRAM(s) IV Push every 2 hours PRN agitation  LORazepam   Injectable 2 milliGRAM(s) IV Push every 1 hour PRN Agitation/ Agitation  morphine  - Injectable 4 milliGRAM(s) IV Push every 1 hour PRN Pain  morphine  - Injectable 4 milliGRAM(s) IV Push every 1 hour PRN Dyspnea    ITEMS UNCHECKED ARE NOT PRESENT    PRESENT SYMPTOMS: [ ]Unable to obtain due to poor mentation   Source if other than patient:  [ ]Family   [ ]Team     Pain: [ ] yes [x] no  QOL impact -   Location -                    Aggravating factors -  Quality -  Radiation -  Timing-  Severity (0-10 scale):  Minimal acceptable level (0-10 scale):     Dyspnea:                           [ ]Mild [ ]Moderate [ ]Severe  Anxiety:                             [ ]Mild [ ]Moderate [ ]Severe  Fatigue:                             [ ]Mild [ ]Moderate [ ]Severe  Nausea:                             [ ]Mild [ ]Moderate [ ]Severe  Loss of appetite:              [ ]Mild [ ]Moderate [ ]Severe  Constipation:                    [ ]Mild [ ]Moderate [ ]Severe    PAINAD Score: 0    [email or web address] (Ctrl +  left click to view)  		  Other Symptoms:  [ ]All other review of systems negative     Palliative Performance Status Version 2:         10%         http://npcrc.org/files/news/palliative_performance_scale_[email or web address].pdf  PHYSICAL EXAM:  Vital Signs Last 24 Hrs  T(C): 37.3 (26 Feb 2020 08:11), Max: 37.3 (26 Feb 2020 08:11)  T(F): 99.1 (26 Feb 2020 08:11), Max: 99.1 (26 Feb 2020 08:11)  HR: 142 (26 Feb 2020 08:11) (142 - 142)  BP: 88/38 (26 Feb 2020 08:11) (88/38 - 88/38)  BP(mean): --  RR: 16 (26 Feb 2020 08:11) (16 - 16)  SpO2: 92% (26 Feb 2020 08:11) (92% - 92%) I&O's Summary    25 Feb 2020 07:01  -  26 Feb 2020 07:00  --------------------------------------------------------  IN: 0 mL / OUT: 325 mL / NET: -325 mL    GENERAL:  [ ] Anxiety  [ ] Delirium [ x ] Agitation (intermittent) [ ] Other  HEENT:  [ x ]Normal   [ ]Dry mouth   [ ]ET Tube/Trach  [ ]Oral lesions  PULMONARY:   [ ]Clear [ ]Tachypnea  [ ]Audible excessive secretions   [ x ]Rhonchi        [ ]Right [ ]Left [x ]Bilateral  [ ]Crackles        [ ]Right [ ]Left [ ]Bilateral  [ ]Wheezing     [ ]Right [ ]Left [ ]Bilateral  [ ]Diminshed BS [ ]Right [ ]Left [ ]Bilateral    CARDIOVASCULAR:    [ ]Regular [ x ]Irregular [ ]Tachy  [ ]Fan [ ]Murmur [ x ]Other S1/S2 audible  GASTROINTESTINAL:  [ x ]Soft  [ ]Distended   [ x ]+BS  [x ]Non tender [ ]Tender  [ ]PEG [ ]OGT/ NGT   Last BM:  2/15/20   GENITOURINARY:  [ ]Normal [ x ] Incontinent   [ ]Oliguria/Anuria   [x ]Michael  MUSCULOSKELETAL:   [ ]Normal   [ ]Weakness [ x ]Bed/Wheelchair bound [ x]Edema-right upper and lower extremities  NEUROLOGIC:   [ ]No focal deficits  [ x ] Cognitive impairment  [ x ] Dysphagia [ ]Dysarthria [ x ] Paresis-right side [ ]Other   SKIN:  ecchymoses  [ x ]Normal  [ ]Rash     [ ]Pressure ulcer(s)  [ ]y [ ]n  Present on admission       CRITICAL CARE:  [ ] Shock Present  [ ]Septic [ ]Cardiogenic [ ]Neurologic [ ]Hypovolemic  [ ]  Vasopressors [ ]  Inotropes   [ ] Respiratory failure present [ ] Mechanical Ventilation [ ] Non-invasive ventilatory support [ ] High-Flow  [ ] Acute  [ ] Chronic [ ] Hypoxic  [ ] Hypercarbic [ ] Other  [ ] Other organ failure     LABS:            RADIOLOGY & ADDITIONAL STUDIES:    PROTEIN CALORIE MALNUTRITION: [ ] mild [ ] moderate [ ] severe  [ ] underweight [ ] morbid obesity    https://www.andeal.org/vault/2440/web/files/ONC/Table_Clinical%20Characteristics%20to%20Document%20Malnutrition-White%20JV%20et%20al%024692.pdf    Height (cm): 167.6 (02-11-20 @ 01:18), 160.02 (02-10-20 @ 16:50)  Weight (kg): 67.6 (02-11-20 @ 01:18), 71 (02-10-20 @ 16:50)  BMI (kg/m2): 24.1 (02-11-20 @ 01:18), 27.7 (02-10-20 @ 16:50)    [ ] PPSV2 < or = 30% [ ] significant weight loss [ ] poor nutritional intake [ ] anasarca Albumin, Serum: 4.1 g/dL (02-10-20 @ 17:46)    Artificial Nutrition [ ]     REFERRALS:   [ ]Chaplaincy  [x ] Hospice  [ ]Child Life  [ ]Social Work  [ ]Case management [ ]Holistic Therapy [ ] Physical Therapy [ ] Dietary   Goals of Care Document: GAP TEAM PALLIATIVE CARE UNIT PROGRESS NOTE:      [  ] Patient on hospice program.    INDICATION FOR PALLIATIVE CARE UNIT SERVICES:  (Transfer from  [facility] ) In the PCU for symptom management s/p compassionate extubation in setting of  L MCA infarction    INTERVAL HPI/OVERNIGHT EVENTS: Overnight pt received Tylenol for continued fevers. She also received haldol PRN x1 for agitation. Vitals also notable for increasing tachycardia and now developing hypotension.     DNR on chart: Yes    Allergies    No Known Allergies    Intolerances    MEDICATIONS  (STANDING):  clocortolone 0.1% 1 Application(s) 1 Application(s) Topical two times a day  haloperidol    Injectable 1 milliGRAM(s) IV Push every 6 hours  morphine  Infusion 2 mG/Hr (2 mL/Hr) IV Continuous <Continuous>  sodium chloride 0.9%. 1000 milliLiter(s) (10 mL/Hr) IV Continuous <Continuous>    MEDICATIONS  (PRN):  acetaminophen  Suppository .. 650 milliGRAM(s) Rectal every 6 hours PRN Temp greater or equal to 38C (100.4F)  bisacodyl Suppository 10 milliGRAM(s) Rectal daily PRN Constipation  glycopyrrolate Injectable 0.4 milliGRAM(s) IV Push every 6 hours PRN Secretions  haloperidol    Injectable 1 milliGRAM(s) IV Push every 2 hours PRN agitation  LORazepam   Injectable 2 milliGRAM(s) IV Push every 1 hour PRN Agitation/ Agitation  morphine  - Injectable 4 milliGRAM(s) IV Push every 1 hour PRN Pain  morphine  - Injectable 4 milliGRAM(s) IV Push every 1 hour PRN Dyspnea    ITEMS UNCHECKED ARE NOT PRESENT    PRESENT SYMPTOMS: [x ]Unable to obtain due to poor mentation secondary to CVA  Source if other than patient:  [ ]Family   [ ]Team     Pain: [ ] yes [x] no  QOL impact -   Location -                    Aggravating factors -  Quality -  Radiation -  Timing-  Severity (0-10 scale):  Minimal acceptable level (0-10 scale):     Dyspnea:                           [ ]Mild [ ]Moderate [ ]Severe  Anxiety:                             [ ]Mild [ ]Moderate [ ]Severe  Fatigue:                             [ ]Mild [ ]Moderate [ ]Severe  Nausea:                             [ ]Mild [ ]Moderate [ ]Severe  Loss of appetite:              [ ]Mild [ ]Moderate [ ]Severe  Constipation:                    [ ]Mild [ ]Moderate [ ]Severe    PAINAD Score: 0    [email or web address].[place].edu/cog/painad.pdf (Ctrl +  left click to view)  		  Other Symptoms:  [ ]All other review of systems negative     Palliative Performance Status Version 2:         10%         [email or web address]/files/[email or web address]_[email or web address]  PHYSICAL EXAM:  Vital Signs Last 24 Hrs  T(C): 37.3 (26 Feb 2020 08:11), Max: 37.3 (26 Feb 2020 08:11)  T(F): 99.1 (26 Feb 2020 08:11), Max: 99.1 (26 Feb 2020 08:11)  HR: 142 (26 Feb 2020 08:11) (142 - 142)  BP: 88/38 (26 Feb 2020 08:11) (88/38 - 88/38)  BP(mean): --  RR: 16 (26 Feb 2020 08:11) (16 - 16)  SpO2: 92% (26 Feb 2020 08:11) (92% - 92%) I&O's Summary    25 Feb 2020 07:01  -  26 Feb 2020 07:00  --------------------------------------------------------  IN: 0 mL / OUT: 325 mL / NET: -325 mL    GENERAL:  [ ] Anxiety  [ ] Delirium [ x ] Agitation (intermittent) [ ] Other  HEENT:  [ x ]Normal   [ ]Dry mouth   [ ]ET Tube/Trach  [ ]Oral lesions  PULMONARY:   [ ]Clear [ ]Tachypnea  [ ]Audible excessive secretions   [ x ]Rhonchi        [ ]Right [ ]Left [x ]Bilateral  [ ]Crackles        [ ]Right [ ]Left [ ]Bilateral  [ ]Wheezing     [ ]Right [ ]Left [ ]Bilateral  [ ]Diminshed BS [ ]Right [ ]Left [ ]Bilateral    CARDIOVASCULAR:    [ ]Regular [ x ]Irregular [ ]Tachy  [ ]Fan [ ]Murmur [ x ]Other S1/S2 audible  GASTROINTESTINAL:  [ x ]Soft  [ ]Distended   [ x ]+BS  [x ]Non tender [ ]Tender  [ ]PEG [ ]OGT/ NGT   Last BM:  2/15/20   GENITOURINARY:  [ ]Normal [ x ] Incontinent   [ ]Oliguria/Anuria   [x ]Michael  MUSCULOSKELETAL:   [ ]Normal   [ ]Weakness [ x ]Bed/Wheelchair bound [ x]Edema-right upper and lower extremities  NEUROLOGIC:   [ ]No focal deficits  [ x ] Cognitive impairment  [ x ] Dysphagia [ ]Dysarthria [ x ] Paresis-right side [ ]Other   SKIN:  ecchymoses  [ x ]Normal  [ ]Rash     [ ]Pressure ulcer(s)  [ ]y [ ]n  Present on admission       CRITICAL CARE:  [ ] Shock Present  [ ]Septic [ ]Cardiogenic [ ]Neurologic [ ]Hypovolemic  [ ]  Vasopressors [ ]  Inotropes   [ ] Respiratory failure present [ ] Mechanical Ventilation [ ] Non-invasive ventilatory support [ ] High-Flow  [ ] Acute  [ ] Chronic [ ] Hypoxic  [ ] Hypercarbic [ ] Other  [ ] Other organ failure     LABS: None new.             RADIOLOGY & ADDITIONAL STUDIES: None new.     PROTEIN CALORIE MALNUTRITION: [ ] mild [ ] moderate [ ] severe  [ ] underweight [ ] morbid obesity    https://www.andeal.org/vault/2440/web/files/ONC/Table_Clinical%20Characteristics%20to%20Document%20Malnutrition-White%20JV%20et%20al%448775.pdf    Height (cm): 167.6 (02-11-20 @ 01:18), 160.02 (02-10-20 @ 16:50)  Weight (kg): 67.6 (02-11-20 @ 01:18), 71 (02-10-20 @ 16:50)  BMI (kg/m2): 24.1 (02-11-20 @ 01:18), 27.7 (02-10-20 @ 16:50)    [x ] PPSV2 < or = 30% [ ] significant weight loss [x ] poor nutritional intake [ ] anasarca Albumin, Serum: 4.1 g/dL (02-10-20 @ 17:46)    Artificial Nutrition [ ]     REFERRALS:   [ ]Chaplaincy  [x ] Hospice  [ ]Child Life  [x ]Social Work  [ ]Case management [ ]Holistic Therapy [ ] Physical Therapy [ ] Dietary   Goals of Care Document:

## 2020-02-26 NOTE — PROGRESS NOTE ADULT - ATTENDING COMMENTS
I have personally seen and examined this patient and agree with the above assessment and plan, which I have reviewed and edited where appropriate.     GOC: Symptom management in the setting of acute stroke  Symptoms: Fever, agitation  Disposition: Daughter with severe suffering and the team believes that a hospice transition would be harmful at this time.  To continue care in the PCU.  Ongoing emotional support to the daughter and family members.

## 2020-02-27 PROCEDURE — 99232 SBSQ HOSP IP/OBS MODERATE 35: CPT | Mod: GC

## 2020-02-27 RX ADMIN — SODIUM CHLORIDE 10 MILLILITER(S): 9 INJECTION INTRAMUSCULAR; INTRAVENOUS; SUBCUTANEOUS at 16:15

## 2020-02-27 RX ADMIN — HALOPERIDOL DECANOATE 1 MILLIGRAM(S): 100 INJECTION INTRAMUSCULAR at 05:42

## 2020-02-27 RX ADMIN — HALOPERIDOL DECANOATE 1 MILLIGRAM(S): 100 INJECTION INTRAMUSCULAR at 17:50

## 2020-02-27 RX ADMIN — MORPHINE SULFATE 2 MG/HR: 50 CAPSULE, EXTENDED RELEASE ORAL at 07:40

## 2020-02-27 RX ADMIN — HALOPERIDOL DECANOATE 1 MILLIGRAM(S): 100 INJECTION INTRAMUSCULAR at 00:05

## 2020-02-27 RX ADMIN — MORPHINE SULFATE 2 MG/HR: 50 CAPSULE, EXTENDED RELEASE ORAL at 19:24

## 2020-02-27 RX ADMIN — HALOPERIDOL DECANOATE 1 MILLIGRAM(S): 100 INJECTION INTRAMUSCULAR at 12:39

## 2020-02-27 RX ADMIN — MORPHINE SULFATE 2 MG/HR: 50 CAPSULE, EXTENDED RELEASE ORAL at 16:15

## 2020-02-27 NOTE — PROGRESS NOTE ADULT - PROBLEM SELECTOR PLAN 3
Secondary to delirium, now terminal in nature.  Ativan prn in addition to ATC dosing.      Haldol 1 mg ordered IV q 6 ATC and 1 mg prn q 2.   Will continue to monitor effectiveness and titrate medication as needed. Secondary to delirium, now terminal in nature.  Ativan prn x 1 in the past 24 hours for management of agitation.   Haldol 1 mg ordered IV q 6 ATC and 1 mg prn q 2.   Will continue to monitor effectiveness and titrate medication as needed. 79

## 2020-02-27 NOTE — PROGRESS NOTE ADULT - PROBLEM SELECTOR PLAN 5
Daughter continues to have great existential suffering surrounding the suddenness of her mother's illness and the loss of her independence.  Supportive listening provided. Remains in the dying process. Daughter has been having existential suffering related to her mother's CVA and her decision to withdraw care even knowing this was in align with her mother's previously stated wishes. Supportive care from palliative team, SW, and chaplaincy. Patient had outside  at bedside yesterday. Patient appears to be in the dying process and daughter very overwhelmed at the prospect of a transition to inpatient hospice. Patient continues to require medication to actively manage symptoms of dyspnea and agitation.

## 2020-02-27 NOTE — PROGRESS NOTE ADULT - ATTENDING COMMENTS
Patient seen and examined and agree with the above documentation with the following additions: required x1 dose of ativan over last 24 hours.   Appears comfortable on examination. Remains on morphine infusion at this time, haldol ATC, ativan PRN.   continue care on PCU. No family at bedside. Rest of management as above

## 2020-02-27 NOTE — PROGRESS NOTE ADULT - ASSESSMENT
78 year old female presented with unresponsiveness at home. At [place] she was found to have R hemiparesis and global aphasia, consistent with a L MCA syndrome. Her CT head reveals an acute L MCA infarction. 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 Not a candidate for IV tPA due to out of window.  Patient was transferred to the PCU for compassionate extubation which took place on  2/16/20 in the PCU. Patient remains minimally to unresponsive with non-purposeful movements on the left side. 78 year old female presented with unresponsiveness at home. At [place] she was found to have R hemiparesis and global aphasia, consistent with a L MCA syndrome. Her CT head reveals an acute L MCA infarction. 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 Not a candidate for IV tPA due to out of window.  Patient was transferred to the PCU for compassionate extubation which took place on  2/16/20 in the PCU. Patient remains minimally to unresponsive with non-purposeful movements on the left side.     Ativan prn x 1 in the past 24 hours for breakthrough agitation. Unresponsive and appears comfortable on assessment. Intermittent periods of tachypnea.

## 2020-02-27 NOTE — PROGRESS NOTE ADULT - SUBJECTIVE AND OBJECTIVE BOX
GAP TEAM PALLIATIVE CARE UNIT PROGRESS NOTE:      [  ] Patient on hospice program.    INDICATION FOR PALLIATIVE CARE UNIT SERVICES:  (Transfer from  [facility] ) In the PCU for symptom management s/p compassionate extubation in setting of  L MCA infarction    INTERVAL HPI/OVERNIGHT EVENTS:     DNR on chart: Yes    Allergies    No Known Allergies    Intolerances    MEDICATIONS  (STANDING):  clocortolone 0.1% 1 Application(s) 1 Application(s) Topical two times a day  haloperidol    Injectable 1 milliGRAM(s) IV Push every 6 hours  morphine  Infusion 2 mG/Hr (2 mL/Hr) IV Continuous <Continuous>  sodium chloride 0.9%. 1000 milliLiter(s) (10 mL/Hr) IV Continuous <Continuous>    MEDICATIONS  (PRN):  acetaminophen  Suppository .. 650 milliGRAM(s) Rectal every 6 hours PRN Temp greater or equal to 38C (100.4F)  bisacodyl Suppository 10 milliGRAM(s) Rectal daily PRN Constipation  glycopyrrolate Injectable 0.4 milliGRAM(s) IV Push every 6 hours PRN Secretions  haloperidol    Injectable 1 milliGRAM(s) IV Push every 2 hours PRN agitation  LORazepam   Injectable 2 milliGRAM(s) IV Push every 1 hour PRN Agitation/ Agitation  morphine  - Injectable 4 milliGRAM(s) IV Push every 1 hour PRN Pain  morphine  - Injectable 4 milliGRAM(s) IV Push every 1 hour PRN Dyspnea    ITEMS UNCHECKED ARE NOT PRESENT    PRESENT SYMPTOMS: [x]Unable to obtain due to poor mentation from L MCA CVA  Source if other than patient:  [ ]Family   [ ]Team     Pain: [ ] yes [x] no  QOL impact -   Location -                    Aggravating factors -  Quality -  Radiation -  Timing-  Severity (0-10 scale):  Minimal acceptable level (0-10 scale):     Dyspnea:                           [ ]Mild [ ]Moderate [ ]Severe  Anxiety:                             [ ]Mild [ ]Moderate [ ]Severe  Fatigue:                             [ ]Mild [ ]Moderate [ ]Severe  Nausea:                             [ ]Mild [ ]Moderate [ ]Severe  Loss of appetite:              [ ]Mild [ ]Moderate [ ]Severe  Constipation:                    [ ]Mild [ ]Moderate [ ]Severe    PAINAD Score: 0    [email or web address] (Ctrl +  left click to view)  		  Other Symptoms:  [ ]All other review of systems negative     Palliative Performance Status Version 2:  10       %         http://npcrc.org/files/news/palliative_performance_scale_ppsv2.pdf  PHYSICAL EXAM:  Vital Signs Last 24 Hrs  T(C): 36.9 (27 Feb 2020 08:04), Max: 36.9 (27 Feb 2020 08:04)  T(F): 98.4 (27 Feb 2020 08:04), Max: 98.4 (27 Feb 2020 08:04)  HR: 107 (27 Feb 2020 08:04) (107 - 107)  BP: 100/60 (27 Feb 2020 08:04) (100/60 - 100/60)  BP(mean): --  RR: 18 (27 Feb 2020 08:04) (18 - 18)  SpO2: 90% (27 Feb 2020 08:04) (90% - 90%)    24 Feb 2020 07:01  -  25 Feb 2020 07:00  --------------------------------------------------------  IN: 0 mL / OUT: 200 mL / NET: -200 mL    GENERAL:  [ ] Anxiety  [ ] Delirium [ x ] Agitation (intermittent) [ ] Other  HEENT:  [ x ]Normal   [ ]Dry mouth   [ ]ET Tube/Trach  [ ]Oral lesions  PULMONARY:   [ ]Clear [ ]Tachypnea  [ ]Audible excessive secretions   [ x ]Rhonchi        [ ]Right [ ]Left [x ]Bilateral  [ ]Crackles        [ ]Right [ ]Left [ ]Bilateral  [ ]Wheezing     [ ]Right [ ]Left [ ]Bilateral  [ ]Diminshed BS [ ]Right [ ]Left [ ]Bilateral    CARDIOVASCULAR:    [ ]Regular [ x ]Irregular [ ]Tachy  [ ]Fan [ ]Murmur [ x ]Other S1/S2 audible  GASTROINTESTINAL:  [ x ]Soft  [ ]Distended   [ x ]+BS  [x ]Non tender [ ]Tender  [ ]PEG [ ]OGT/ NGT   Last BM:  2/15/20   GENITOURINARY:  [ ]Normal [ x ] Incontinent   [ ]Oliguria/Anuria   [x ]Michael  MUSCULOSKELETAL:   [ ]Normal   [ ]Weakness [ x ]Bed/Wheelchair bound [ x]Edema-right upper and lower extremities  NEUROLOGIC:   [ ]No focal deficits  [ x ] Cognitive impairment  [ x ] Dysphagia [ ]Dysarthria [ x ] Paresis-right side [ ]Other   SKIN:  ecchymoses  [ x ]Normal  [ ]Rash     [ ]Pressure ulcer(s)  [ ]y [ ]n  Present on admission      CRITICAL CARE:  [ ] Shock Present  [ ]Septic [ ]Cardiogenic [ ]Neurologic [ ]Hypovolemic  [ ]  Vasopressors [ ]  Inotropes   [ ] Respiratory failure present [ ] Mechanical Ventilation [ ] Non-invasive ventilatory support [ ] High-Flow  [ ] Acute  [ ] Chronic [ ] Hypoxic  [ ] Hypercarbic [ ] Other  [x ] Other organ failure brain    LABS: None new.             RADIOLOGY & ADDITIONAL STUDIES: None new.     PROTEIN CALORIE MALNUTRITION: [ ] mild [ ] moderate [ ] severe  [ ] underweight [ ] morbid obesity    https://www.andeal.org/vault/2440/web/files/ONC/Table_Clinical%20Characteristics%20to%20Document%20Malnutrition-White%20JV%20et%20al%862023.pdf    Height (cm): 167.6 (02-11-20 @ 01:18), 160.02 (02-10-20 @ 16:50)  Weight (kg): 67.6 (02-11-20 @ 01:18), 71 (02-10-20 @ 16:50)  BMI (kg/m2): 24.1 (02-11-20 @ 01:18), 27.7 (02-10-20 @ 16:50)    [ ] PPSV2 < or = 30% [ ] significant weight loss [ ] poor nutritional intake [ ] anasarca Albumin, Serum: 4.1 g/dL (02-10-20 @ 17:46)    Artificial Nutrition [ ]     REFERRALS:   [ ]Chaplaincy  [x] Hospice  [ ]Child Life  [ ]Social Work  [ ]Case management [ ]Holistic Therapy [ ] Physical Therapy [ ] Dietary   Goals of Care Document: GAP TEAM PALLIATIVE CARE UNIT PROGRESS NOTE:      [  ] Patient on hospice program.    INDICATION FOR PALLIATIVE CARE UNIT SERVICES:  (Transfer from  [facility] ) In the PCU for symptom management s/p compassionate extubation in setting of  L MCA infarction    INTERVAL HPI/OVERNIGHT EVENTS: Ativan prn x 1 in the past 24 hours. Remains on Morphine drip and ATC Haldol. Unresponsive. No signs of agitation on assessment. Remains in the dying process.    DNR on chart: Yes    Allergies    No Known Allergies    Intolerances    MEDICATIONS  (STANDING):  clocortolone 0.1% 1 Application(s) 1 Application(s) Topical two times a day  haloperidol    Injectable 1 milliGRAM(s) IV Push every 6 hours  morphine  Infusion 2 mG/Hr (2 mL/Hr) IV Continuous <Continuous>  sodium chloride 0.9%. 1000 milliLiter(s) (10 mL/Hr) IV Continuous <Continuous>    MEDICATIONS  (PRN):  acetaminophen  Suppository .. 650 milliGRAM(s) Rectal every 6 hours PRN Temp greater or equal to 38C (100.4F)  bisacodyl Suppository 10 milliGRAM(s) Rectal daily PRN Constipation  glycopyrrolate Injectable 0.4 milliGRAM(s) IV Push every 6 hours PRN Secretions  haloperidol    Injectable 1 milliGRAM(s) IV Push every 2 hours PRN agitation  LORazepam   Injectable 2 milliGRAM(s) IV Push every 1 hour PRN Agitation/ Agitation  morphine  - Injectable 4 milliGRAM(s) IV Push every 1 hour PRN Pain  morphine  - Injectable 4 milliGRAM(s) IV Push every 1 hour PRN Dyspnea    ITEMS UNCHECKED ARE NOT PRESENT    PRESENT SYMPTOMS: [x]Unable to obtain due to poor mentation from L MCA CVA  Source if other than patient:  [ ]Family   [ ]Team     Pain: [ ] yes [x] no  QOL impact -   Location -                    Aggravating factors -  Quality -  Radiation -  Timing-  Severity (0-10 scale):  Minimal acceptable level (0-10 scale):     Dyspnea:                           [ ]Mild [ ]Moderate [ ]Severe  Anxiety:                             [ ]Mild [ ]Moderate [ ]Severe  Fatigue:                             [ ]Mild [ ]Moderate [ ]Severe  Nausea:                             [ ]Mild [ ]Moderate [ ]Severe  Loss of appetite:              [ ]Mild [ ]Moderate [ ]Severe  Constipation:                    [ ]Mild [ ]Moderate [ ]Severe    PAINAD Score: 0    [email or web address] (Ctrl +  left click to view)  		  Other Symptoms:  [ ]All other review of systems negative     Palliative Performance Status Version 2:  10%         http://npcrc.org/files/news/palliative_performance_scale_ppsv2.pdf  PHYSICAL EXAM:  Vital Signs Last 24 Hrs  T(C): 36.9 (27 Feb 2020 08:04), Max: 36.9 (27 Feb 2020 08:04)  T(F): 98.4 (27 Feb 2020 08:04), Max: 98.4 (27 Feb 2020 08:04)  HR: 107 (27 Feb 2020 08:04) (107 - 107)  BP: 100/60 (27 Feb 2020 08:04) (100/60 - 100/60)  BP(mean): --  RR: 18 (27 Feb 2020 08:04) (18 - 18)  SpO2: 90% (27 Feb 2020 08:04) (90% - 90%)    24 Feb 2020 07:01  -  25 Feb 2020 07:00  --------------------------------------------------------  IN: 0 mL / OUT: 200 mL / NET: -200 mL    GENERAL:  [ ] Anxiety  [ ] Delirium [ x ] Agitation (intermittent) [ ] Other  HEENT:  [ x ]Normal   [ ]Dry mouth   [ ]ET Tube/Trach  [ ]Oral lesions  PULMONARY:   [ ]Clear [ ]Tachypnea  [ ]Audible excessive secretions   [ x ]Rhonchi        [ ]Right [ ]Left [x ]Bilateral  [ ]Crackles        [ ]Right [ ]Left [ ]Bilateral  [ ]Wheezing     [ ]Right [ ]Left [ ]Bilateral  [ ]Diminshed BS [ ]Right [ ]Left [ ]Bilateral    CARDIOVASCULAR:    [ ]Regular [ x ]Irregular [ ]Tachy  [ ]Fan [ ]Murmur [ x ]Other S1/S2 audible  GASTROINTESTINAL:  [ x ]Soft  [ ]Distended   [ x ]+BS  [x ]Non tender [ ]Tender  [ ]PEG [ ]OGT/ NGT   Last BM:  2/15/20 (s/p Dulcolax suppositories)  GENITOURINARY:  [ ]Normal [ x ] Incontinent   [ ]Oliguria/Anuria   [x ]Michael  MUSCULOSKELETAL:   [ ]Normal   [ ]Weakness [ x ]Bed/Wheelchair bound [ x]Edema-right upper and lower extremities  NEUROLOGIC:   [ ]No focal deficits  [ x ] Cognitive impairment  [ x ] Dysphagia [ ]Dysarthria [ x ] Paresis-right side [ ]Other   SKIN:  ecchymoses  [ x ]Normal  [ ]Rash     [ ]Pressure ulcer(s)  [ ]y [ ]n  Present on admission      CRITICAL CARE:  [ ] Shock Present  [ ]Septic [ ]Cardiogenic [ ]Neurologic [ ]Hypovolemic  [ ]  Vasopressors [ ]  Inotropes   [ ] Respiratory failure present [ ] Mechanical Ventilation [ ] Non-invasive ventilatory support [ ] High-Flow  [ ] Acute  [ ] Chronic [ ] Hypoxic  [ ] Hypercarbic [ ] Other  [x ] Other organ failure brain    LABS: None new.             RADIOLOGY & ADDITIONAL STUDIES: None new.     PROTEIN CALORIE MALNUTRITION: [ ] mild [ ] moderate [ ] severe  [ ] underweight [ ] morbid obesity    https://www.andeal.org/vault/2440/web/files/ONC/Table_Clinical%20Characteristics%20to%20Document%20Malnutrition-White%20JV%20et%20al%571727.pdf    Height (cm): 167.6 (02-11-20 @ 01:18), 160.02 (02-10-20 @ 16:50)  Weight (kg): 67.6 (02-11-20 @ 01:18), 71 (02-10-20 @ 16:50)  BMI (kg/m2): 24.1 (02-11-20 @ 01:18), 27.7 (02-10-20 @ 16:50)    [ ] PPSV2 < or = 30% [ ] significant weight loss [ ] poor nutritional intake [ ] anasarca Albumin, Serum: 4.1 g/dL (02-10-20 @ 17:46)    Artificial Nutrition [ ]     REFERRALS:   [ ]Chaplaincy  [x] Hospice  [ ]Child Life  [ ]Social Work  [ ]Case management [ ]Holistic Therapy [ ] Physical Therapy [ ] Dietary   Goals of Care Document: GAP TEAM PALLIATIVE CARE UNIT PROGRESS NOTE:      [  ] Patient on hospice program.    INDICATION FOR PALLIATIVE CARE UNIT SERVICES:  (Transfer from  [facility] ) In the PCU for symptom management s/p compassionate extubation in setting of  L MCA infarction    INTERVAL HPI/OVERNIGHT EVENTS: Ativan prn x 1 in the past 24 hours. Remains on Morphine drip and ATC Haldol. Unresponsive. No signs of agitation on assessment. Remains in the dying process.    DNR on chart: Yes    Allergies    No Known Allergies    Intolerances    MEDICATIONS  (STANDING):  clocortolone 0.1% 1 Application(s) 1 Application(s) Topical two times a day  haloperidol    Injectable 1 milliGRAM(s) IV Push every 6 hours  morphine  Infusion 2 mG/Hr (2 mL/Hr) IV Continuous <Continuous>  sodium chloride 0.9%. 1000 milliLiter(s) (10 mL/Hr) IV Continuous <Continuous>    MEDICATIONS  (PRN):  acetaminophen  Suppository .. 650 milliGRAM(s) Rectal every 6 hours PRN Temp greater or equal to 38C (100.4F)  bisacodyl Suppository 10 milliGRAM(s) Rectal daily PRN Constipation  glycopyrrolate Injectable 0.4 milliGRAM(s) IV Push every 6 hours PRN Secretions  haloperidol    Injectable 1 milliGRAM(s) IV Push every 2 hours PRN agitation  LORazepam   Injectable 2 milliGRAM(s) IV Push every 1 hour PRN Agitation/ Agitation  morphine  - Injectable 4 milliGRAM(s) IV Push every 1 hour PRN Pain  morphine  - Injectable 4 milliGRAM(s) IV Push every 1 hour PRN Dyspnea    ITEMS UNCHECKED ARE NOT PRESENT    PRESENT SYMPTOMS: [x]Unable to obtain due to poor mentation from L MCA CVA  Source if other than patient:  [ ]Family   [ ]Team     Pain: [ ] yes [x] no  QOL impact -   Location -                    Aggravating factors -  Quality -  Radiation -  Timing-  Severity (0-10 scale):  Minimal acceptable level (0-10 scale):     Dyspnea:                           [ ]Mild [ ]Moderate [ ]Severe  Anxiety:                             [ ]Mild [ ]Moderate [ ]Severe  Fatigue:                             [ ]Mild [ ]Moderate [ ]Severe  Nausea:                             [ ]Mild [ ]Moderate [ ]Severe  Loss of appetite:              [ ]Mild [ ]Moderate [ ]Severe  Constipation:                    [ ]Mild [ ]Moderate [ ]Severe    PAINAD Score: 0    [email or web address] (Ctrl +  left click to view)  		  Other Symptoms:  [ ]All other review of systems negative     Palliative Performance Status Version 2:  10%         http://npcrc.org/files/news/palliative_performance_scale_ppsv2.pdf  PHYSICAL EXAM:  Vital Signs Last 24 Hrs  T(C): 36.9 (27 Feb 2020 08:04), Max: 36.9 (27 Feb 2020 08:04)  T(F): 98.4 (27 Feb 2020 08:04), Max: 98.4 (27 Feb 2020 08:04)  HR: 107 (27 Feb 2020 08:04) (107 - 107)  BP: 100/60 (27 Feb 2020 08:04) (100/60 - 100/60)  BP(mean): --  RR: 18 (27 Feb 2020 08:04) (18 - 18)  SpO2: 90% (27 Feb 2020 08:04) (90% - 90%)    24 Feb 2020 07:01  -  25 Feb 2020 07:00  --------------------------------------------------------  IN: 0 mL / OUT: 200 mL / NET: -200 mL    GENERAL:  [ ] Anxiety  [ ] Delirium [ x ] Agitation (intermittent) [ ] Other  HEENT:  [ x ]Normal   [ ]Dry mouth   [ ]ET Tube/Trach  [ ]Oral lesions  PULMONARY:   [ ]Clear [ ]Tachypnea  [ ]Audible excessive secretions   [ x ]Rhonchi        [ ]Right [ ]Left [x ]Bilateral  [ ]Crackles        [ ]Right [ ]Left [ ]Bilateral  [ ]Wheezing     [ ]Right [ ]Left [ ]Bilateral  [ ]Diminshed BS [ ]Right [ ]Left [ ]Bilateral    CARDIOVASCULAR:    [ ]Regular [ x ]Irregular [ ]Tachy  [ ]Fan [ ]Murmur [ x ]Other S1/S2 audible  GASTROINTESTINAL:  [ x ]Soft  [ ]Distended   [ x ]+BS  [x ]Non tender [ ]Tender  [ ]PEG [ ]OGT/ NGT   Last BM:  2/15/20 (s/p Dulcolax suppositories)  GENITOURINARY:  [ ]Normal [ x ] Incontinent   [ ]Oliguria/Anuria   [x ]Michael  MUSCULOSKELETAL:   [ ]Normal   [ ]Weakness [ x ]Bed/Wheelchair bound [ x]Edema-right upper and lower extremities  NEUROLOGIC:   [ ]No focal deficits  [ x ] Cognitive impairment  [ x ] Dysphagia [ ]Dysarthria [ x ] Paresis-right side [ ]Other   SKIN:  ecchymoses  [ x ]Normal  [ ]Rash     [ ]Pressure ulcer(s)  [ ]y [ ]n  Present on admission      CRITICAL CARE:  [ ] Shock Present  [ ]Septic [ ]Cardiogenic [ ]Neurologic [ ]Hypovolemic  [ ]  Vasopressors [ ]  Inotropes   [ ] Respiratory failure present [ ] Mechanical Ventilation [ ] Non-invasive ventilatory support [ ] High-Flow  [ ] Acute  [ ] Chronic [ ] Hypoxic  [ ] Hypercarbic [ ] Other  [x ] Other organ failure brain    LABS: None new.     RADIOLOGY & ADDITIONAL STUDIES: None new.     PROTEIN CALORIE MALNUTRITION: [ ] mild [ ] moderate [ ] severe  [ ] underweight [ ] morbid obesity    https://www.andeal.org/vault/2440/web/files/ONC/Table_Clinical%20Characteristics%20to%20Document%20Malnutrition-White%20JV%20et%20al%993654.pdf    Height (cm): 167.6 (02-11-20 @ 01:18), 160.02 (02-10-20 @ 16:50)  Weight (kg): 67.6 (02-11-20 @ 01:18), 71 (02-10-20 @ 16:50)  BMI (kg/m2): 24.1 (02-11-20 @ 01:18), 27.7 (02-10-20 @ 16:50)    [ ] PPSV2 < or = 30% [ ] significant weight loss [ ] poor nutritional intake [ ] anasarca Albumin, Serum: 4.1 g/dL (02-10-20 @ 17:46)    Artificial Nutrition [ ]     REFERRALS:   [ ]Chaplaincy  [x] Hospice  [ ]Child Life  [ ]Social Work  [ ]Case management [ ]Holistic Therapy [ ] Physical Therapy [ ] Dietary   Goals of Care Document:

## 2020-02-27 NOTE — PROGRESS NOTE ADULT - PROBLEM SELECTOR PLAN 1
Acute left MCA cva, outside of the window for thrombolytic therapy.   Exam remains poor with no hope for meaningful recovery. Acute left MCA cva, outside of the window for thrombolytic therapy.   Exam remains poor with no hope for meaningful recovery. Appears to be in the dying process.

## 2020-02-27 NOTE — CHART NOTE - NSCHARTNOTEFT_GEN_A_CORE
Per discussion with RN, pt is not appropriate for nutrition follow up at this time, Pt noted to be in the active process of dying. RD to remain available as needed.    Kayli Gonzalez R.D., [facility], Pager #[phone]

## 2020-02-27 NOTE — PROGRESS NOTE ADULT - PROBLEM SELECTOR PLAN 2
S/p compassionate extubation in the PCU 2/16/20.    No PRNs in the past 24 hours.   Morphine drip 2 mg/hour and prn doses remain at 4 mg. S/p compassionate extubation in the PCU 2/16/20.    No use of prn Morphine in the past 24 hours.   Morphine drip 2 mg/hour and prn doses remain at 4 mg. Intermittent periods of tachypnea likely 2/2 to neurologic impairment and the dying process.

## 2020-02-28 VITALS
TEMPERATURE: 98 F | HEART RATE: 112 BPM | SYSTOLIC BLOOD PRESSURE: 64 MMHG | RESPIRATION RATE: 18 BRPM | DIASTOLIC BLOOD PRESSURE: 43 MMHG | OXYGEN SATURATION: 90 %

## 2020-02-28 DIAGNOSIS — K59.01 SLOW TRANSIT CONSTIPATION: ICD-10-CM

## 2020-02-28 PROCEDURE — 99232 SBSQ HOSP IP/OBS MODERATE 35: CPT | Mod: GC

## 2020-02-28 RX ADMIN — SODIUM CHLORIDE 10 MILLILITER(S): 9 INJECTION INTRAMUSCULAR; INTRAVENOUS; SUBCUTANEOUS at 08:08

## 2020-02-28 RX ADMIN — MORPHINE SULFATE 2 MG/HR: 50 CAPSULE, EXTENDED RELEASE ORAL at 17:47

## 2020-02-28 RX ADMIN — SODIUM CHLORIDE 10 MILLILITER(S): 9 INJECTION INTRAMUSCULAR; INTRAVENOUS; SUBCUTANEOUS at 05:23

## 2020-02-28 RX ADMIN — MORPHINE SULFATE 2 MG/HR: 50 CAPSULE, EXTENDED RELEASE ORAL at 08:08

## 2020-02-28 RX ADMIN — SODIUM CHLORIDE 10 MILLILITER(S): 9 INJECTION INTRAMUSCULAR; INTRAVENOUS; SUBCUTANEOUS at 23:06

## 2020-02-28 RX ADMIN — HALOPERIDOL DECANOATE 1 MILLIGRAM(S): 100 INJECTION INTRAMUSCULAR at 00:18

## 2020-02-28 RX ADMIN — MORPHINE SULFATE 2 MG/HR: 50 CAPSULE, EXTENDED RELEASE ORAL at 23:05

## 2020-02-28 RX ADMIN — HALOPERIDOL DECANOATE 1 MILLIGRAM(S): 100 INJECTION INTRAMUSCULAR at 05:23

## 2020-02-28 RX ADMIN — HALOPERIDOL DECANOATE 1 MILLIGRAM(S): 100 INJECTION INTRAMUSCULAR at 17:07

## 2020-02-28 RX ADMIN — HALOPERIDOL DECANOATE 1 MILLIGRAM(S): 100 INJECTION INTRAMUSCULAR at 12:20

## 2020-02-28 NOTE — PROGRESS NOTE ADULT - PROBLEM SELECTOR PROBLEM 6
Palliative care encounter

## 2020-02-28 NOTE — PROGRESS NOTE ADULT - PROBLEM SELECTOR PLAN 4
PPSV 10 %. Bedbound and requires nursing assistance with all ADL's S/p compassionate extubation in the PCU 2/16/20.    No use of prn Morphine in the past 24 hours.   Morphine drip 2 mg/hour and prn doses remain at 4 mg. Intermittent periods of tachypnea likely 2/2 to neurologic impairment and the dying process.  Overall, comfortable appearing on assessment.

## 2020-02-28 NOTE — PROGRESS NOTE ADULT - PROBLEM SELECTOR PLAN 5
Remains in the dying process. Daughter has been having existential suffering related to her mother's CVA and her decision to withdraw care even knowing this was in align with her mother's previously stated wishes. Supportive care from palliative team, SW, and chaplaincy. Patient had outside  at bedside yesterday. Patient appears to be in the dying process and daughter very overwhelmed at the prospect of a transition to inpatient hospice. Patient continues to require medication to actively manage symptoms of dyspnea and agitation. Acute left MCA cva, outside of the window for thrombolytic therapy.   Exam remains poor with no hope for meaningful recovery. Appears to be in the dying process. Hypotensive and unresponsive this morning.

## 2020-02-28 NOTE — PROGRESS NOTE ADULT - PROBLEM SELECTOR PLAN 2
S/p compassionate extubation in the PCU 2/16/20.    No use of prn Morphine in the past 24 hours.   Morphine drip 2 mg/hour and prn doses remain at 4 mg. Intermittent periods of tachypnea likely 2/2 to neurologic impairment and the dying process. S/p compassionate extubation in the PCU 2/16/20.    No use of prn Morphine in the past 24 hours.   Morphine drip 2 mg/hour and prn doses remain at 4 mg. Intermittent periods of tachypnea likely 2/2 to neurologic impairment and the dying process.  Overall, comfortable appearing on assessment. Well controlled with ATC Haldol. No prn use of Ativan or Haldol in the past 24 hours.   Haldol 1 mg ordered IV q 6 ATC and 1 mg prn q 2.

## 2020-02-28 NOTE — PROGRESS NOTE ADULT - PROBLEM SELECTOR PLAN 3
Secondary to delirium, now terminal in nature.  Ativan prn x 1 in the past 24 hours for management of agitation.   Haldol 1 mg ordered IV q 6 ATC and 1 mg prn q 2.   Will continue to monitor effectiveness and titrate medication as needed. Well controlled with ATC Haldol. No prn use of Ativan or Haldol in the past 24 hours.   Haldol 1 mg ordered IV q 6 ATC and 1 mg prn q 2. PPSV 10 %. Bedbound and requires nursing assistance with all ADL's

## 2020-02-28 NOTE — PROGRESS NOTE ADULT - ASSESSMENT
78 year old female presented with unresponsiveness at home. At [place] she was found to have R hemiparesis and global aphasia, consistent with a L MCA syndrome. Her CT head reveals an acute L MCA infarction. 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 Not a candidate for IV tPA due to out of window.  Patient was transferred to the PCU for compassionate extubation which took place on  2/16/20 in the PCU. Patient remains minimally to unresponsive with non-purposeful movements on the left side. 78 year old female presented with unresponsiveness at home. At [place] she was found to have R hemiparesis and global aphasia, consistent with a L MCA syndrome. Her CT head reveals an acute L MCA infarction. 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 Not a candidate for IV tPA due to out of window.  Patient was transferred to the PCU for compassionate extubation which took place on  2/16/20 in the PCU. Patient remains minimally to unresponsive with non-purposeful movements on the left side.    No prn use of medication in the past 24 hours. Remains on Morphine drip and ATC Haldol. Hypotensive and unresponsive.

## 2020-02-28 NOTE — PROGRESS NOTE ADULT - PROBLEM SELECTOR PLAN 6
Remains in the dying process. Daughter has been having existential suffering related to her mother's CVA and her decision to withdraw care even knowing this was in align with her mother's previously stated wishes. Supportive care from palliative team, SW, and chaplaincy. Patient had outside  at bedside yesterday. Patient appears to be in the dying process and daughter very overwhelmed at the prospect of a transition to inpatient hospice. Patient continues to require medication to actively manage symptoms of dyspnea and agitation.

## 2020-02-28 NOTE — PROGRESS NOTE ADULT - REASON FOR ADMISSION
Left MCA infarct

## 2020-02-28 NOTE — PROGRESS NOTE ADULT - PROBLEM SELECTOR PLAN 1
Acute left MCA cva, outside of the window for thrombolytic therapy.   Exam remains poor with no hope for meaningful recovery. Appears to be in the dying process. Acute left MCA cva, outside of the window for thrombolytic therapy.   Exam remains poor with no hope for meaningful recovery. Appears to be in the dying process. Hypotensive and unresponsive this morning. Daily suppository PRN.

## 2020-02-28 NOTE — PROGRESS NOTE ADULT - SUBJECTIVE AND OBJECTIVE BOX
GAP TEAM PALLIATIVE CARE UNIT PROGRESS NOTE:      [  ] Patient on hospice program.    INDICATION FOR PALLIATIVE CARE UNIT SERVICES:  (Transfer from  [facility] ) In the PCU for symptom management s/p compassionate extubation in setting of  L MCA infarction    INTERVAL HPI/OVERNIGHT EVENTS:     DNR on chart: Yes    Allergies    No Known Allergies    Intolerances    MEDICATIONS  (STANDING):  clocortolone 0.1% 1 Application(s) 1 Application(s) Topical two times a day  haloperidol    Injectable 1 milliGRAM(s) IV Push every 6 hours  morphine  Infusion 2 mG/Hr (2 mL/Hr) IV Continuous <Continuous>  sodium chloride 0.9%. 1000 milliLiter(s) (10 mL/Hr) IV Continuous <Continuous>    MEDICATIONS  (PRN):  acetaminophen  Suppository .. 650 milliGRAM(s) Rectal every 6 hours PRN Temp greater or equal to 38C (100.4F)  bisacodyl Suppository 10 milliGRAM(s) Rectal daily PRN Constipation  glycopyrrolate Injectable 0.4 milliGRAM(s) IV Push every 6 hours PRN Secretions  haloperidol    Injectable 1 milliGRAM(s) IV Push every 2 hours PRN agitation  LORazepam   Injectable 2 milliGRAM(s) IV Push every 1 hour PRN Agitation/ Agitation  morphine  - Injectable 4 milliGRAM(s) IV Push every 1 hour PRN Pain  morphine  - Injectable 4 milliGRAM(s) IV Push every 1 hour PRN Dyspnea      ITEMS UNCHECKED ARE NOT PRESENT    PRESENT SYMPTOMS: [x]Unable to obtain due to poor mentation from L MCA CVA  Source if other than patient:  [ ]Family   [ ]Team     Pain: [ ] yes [x] no  QOL impact -   Location -                    Aggravating factors -  Quality -  Radiation -  Timing-  Severity (0-10 scale):  Minimal acceptable level (0-10 scale):     Dyspnea:                           [ ]Mild [ ]Moderate [ ]Severe  Anxiety:                             [ ]Mild [ ]Moderate [ ]Severe  Fatigue:                             [ ]Mild [ ]Moderate [ ]Severe  Nausea:                             [ ]Mild [ ]Moderate [ ]Severe  Loss of appetite:              [ ]Mild [ ]Moderate [ ]Severe  Constipation:                    [ ]Mild [ ]Moderate [ ]Severe    PAINAD Score: 0    [email or web address] (Ctrl +  left click to view)  		  Other Symptoms:  [ ]All other review of systems negative     Palliative Performance Status Version 2:  10%         http://npcrc.org/files/news/palliative_performance_scale_ppsv2.pdf  PHYSICAL EXAM:  Vital Signs Last 24 Hrs  T(C): 36.7 (28 Feb 2020 09:33), Max: 36.7 (28 Feb 2020 09:33)  T(F): 98.1 (28 Feb 2020 09:33), Max: 98.1 (28 Feb 2020 09:33)  HR: 112 (28 Feb 2020 09:33) (112 - 112)  BP: 64/43 (28 Feb 2020 09:33) (64/43 - 64/43)  BP(mean): --  RR: 18 (28 Feb 2020 09:33) (18 - 18)  SpO2: 90% (28 Feb 2020 09:33) (90% - 90%)    24 Feb 2020 07:01  -  25 Feb 2020 07:00  --------------------------------------------------------  IN: 0 mL / OUT: 200 mL / NET: -200 mL    GENERAL:  [ ] Anxiety  [ ] Delirium [ x ] Agitation (intermittent) [ ] Other  HEENT:  [ x ]Normal   [ ]Dry mouth   [ ]ET Tube/Trach  [ ]Oral lesions  PULMONARY:   [ ]Clear [ ]Tachypnea  [ ]Audible excessive secretions   [ x ]Rhonchi        [ ]Right [ ]Left [x ]Bilateral  [ ]Crackles        [ ]Right [ ]Left [ ]Bilateral  [ ]Wheezing     [ ]Right [ ]Left [ ]Bilateral  [ ]Diminshed BS [ ]Right [ ]Left [ ]Bilateral    CARDIOVASCULAR:    [ ]Regular [ x ]Irregular [ ]Tachy  [ ]Fan [ ]Murmur [ x ]Other S1/S2 audible  GASTROINTESTINAL:  [ x ]Soft  [ ]Distended   [ x ]+BS  [x ]Non tender [ ]Tender  [ ]PEG [ ]OGT/ NGT   Last BM:  2/15/20 (s/p Dulcolax suppositories)  GENITOURINARY:  [ ]Normal [ x ] Incontinent   [ ]Oliguria/Anuria   [x ]Michael  MUSCULOSKELETAL:   [ ]Normal   [ ]Weakness [ x ]Bed/Wheelchair bound [ x]Edema-right upper and lower extremities  NEUROLOGIC:   [ ]No focal deficits  [ x ] Cognitive impairment  [ x ] Dysphagia [ ]Dysarthria [ x ] Paresis-right side [ ]Other   SKIN:  ecchymoses  [ x ]Normal  [ ]Rash     [ ]Pressure ulcer(s)  [ ]y [ ]n  Present on admission      CRITICAL CARE:  [ ] Shock Present  [ ]Septic [ ]Cardiogenic [ ]Neurologic [ ]Hypovolemic  [ ]  Vasopressors [ ]  Inotropes   [ ] Respiratory failure present [ ] Mechanical Ventilation [ ] Non-invasive ventilatory support [ ] High-Flow  [ ] Acute  [ ] Chronic [ ] Hypoxic  [ ] Hypercarbic [ ] Other  [x ] Other organ failure brain    LABS: None new.     RADIOLOGY & ADDITIONAL STUDIES: None new.     PROTEIN CALORIE MALNUTRITION: [ ] mild [ ] moderate [ ] severe  [ ] underweight [ ] morbid obesity    https://www.andeal.org/vault/2440/web/files/ONC/Table_Clinical%20Characteristics%20to%20Document%20Malnutrition-White%20JV%20et%20al%039358.pdf    Height (cm): 167.6 (02-11-20 @ 01:18), 160.02 (02-10-20 @ 16:50)  Weight (kg): 67.6 (02-11-20 @ 01:18), 71 (02-10-20 @ 16:50)  BMI (kg/m2): 24.1 (02-11-20 @ 01:18), 27.7 (02-10-20 @ 16:50)    [ ] PPSV2 < or = 30% [ ] significant weight loss [ ] poor nutritional intake [ ] anasarca Albumin, Serum: 4.1 g/dL (02-10-20 @ 17:46)    Artificial Nutrition [ ]     REFERRALS:   [ ]Chaplaincy  [x] Hospice  [ ]Child Life  [ ]Social Work  [ ]Case management [ ]Holistic Therapy [ ] Physical Therapy [ ] Dietary   Goals of Care Document: GAP TEAM PALLIATIVE CARE UNIT PROGRESS NOTE:      [  ] Patient on hospice program.    INDICATION FOR PALLIATIVE CARE UNIT SERVICES:  (Transfer from  [facility] ) In the PCU for symptom management s/p compassionate extubation in setting of  L MCA infarction    INTERVAL HPI/OVERNIGHT EVENTS: No prn use of medication in the past 24 hours. Remains on Morphine gtt @ 2 mg/hour and Haldol 1 mg IV q 6 ATC. Hypotensive this morning. Remains unresponsive.    DNR on chart: Yes    Allergies    No Known Allergies    Intolerances    MEDICATIONS  (STANDING):  clocortolone 0.1% 1 Application(s) 1 Application(s) Topical two times a day  haloperidol    Injectable 1 milliGRAM(s) IV Push every 6 hours  morphine  Infusion 2 mG/Hr (2 mL/Hr) IV Continuous <Continuous>  sodium chloride 0.9%. 1000 milliLiter(s) (10 mL/Hr) IV Continuous <Continuous>    MEDICATIONS  (PRN):  acetaminophen  Suppository .. 650 milliGRAM(s) Rectal every 6 hours PRN Temp greater or equal to 38C (100.4F)  bisacodyl Suppository 10 milliGRAM(s) Rectal daily PRN Constipation  glycopyrrolate Injectable 0.4 milliGRAM(s) IV Push every 6 hours PRN Secretions  haloperidol    Injectable 1 milliGRAM(s) IV Push every 2 hours PRN agitation  LORazepam   Injectable 2 milliGRAM(s) IV Push every 1 hour PRN Agitation/ Agitation  morphine  - Injectable 4 milliGRAM(s) IV Push every 1 hour PRN Pain  morphine  - Injectable 4 milliGRAM(s) IV Push every 1 hour PRN Dyspnea      ITEMS UNCHECKED ARE NOT PRESENT    PRESENT SYMPTOMS: [x]Unable to obtain due to poor mentation from L MCA CVA  Source if other than patient:  [ ]Family   [ ]Team     Pain: [ ] yes [x] no  QOL impact -   Location -                    Aggravating factors -  Quality -  Radiation -  Timing-  Severity (0-10 scale):  Minimal acceptable level (0-10 scale):     Dyspnea:                           [ ]Mild [ ]Moderate [ ]Severe  Anxiety:                             [ ]Mild [ ]Moderate [ ]Severe  Fatigue:                             [ ]Mild [ ]Moderate [ ]Severe  Nausea:                             [ ]Mild [ ]Moderate [ ]Severe  Loss of appetite:              [ ]Mild [ ]Moderate [ ]Severe  Constipation:                    [ ]Mild [ ]Moderate [ ]Severe    PAINAD Score: 0    [email or web address] (Ctrl +  left click to view)  		  Other Symptoms:  [ ]All other review of systems negative     Palliative Performance Status Version 2:  10%         http://npcrc.org/files/news/palliative_performance_scale_ppsv2.pdf  PHYSICAL EXAM:  Vital Signs Last 24 Hrs  T(C): 36.7 (28 Feb 2020 09:33), Max: 36.7 (28 Feb 2020 09:33)  T(F): 98.1 (28 Feb 2020 09:33), Max: 98.1 (28 Feb 2020 09:33)  HR: 112 (28 Feb 2020 09:33) (112 - 112)  BP: 64/43 (28 Feb 2020 09:33) (64/43 - 64/43)  BP(mean): --  RR: 18 (28 Feb 2020 09:33) (18 - 18)  SpO2: 90% (28 Feb 2020 09:33) (90% - 90%)    24 Feb 2020 07:01  -  25 Feb 2020 07:00  --------------------------------------------------------  IN: 0 mL / OUT: 200 mL / NET: -200 mL    GENERAL:  [ ] Anxiety  [ ] Delirium [  ] Agitation  [ ] Other  HEENT:  [ x ]Normal   [ ]Dry mouth   [ ]ET Tube/Trach  [ ]Oral lesions  PULMONARY:   [ ]Clear [ ]Tachypnea  [ ]Audible excessive secretions   [ x ]Rhonchi        [ ]Right [ ]Left [x ]Bilateral  [ ]Crackles        [ ]Right [ ]Left [ ]Bilateral  [ ]Wheezing     [ ]Right [ ]Left [ ]Bilateral  [ ]Diminshed BS [ ]Right [ ]Left [ ]Bilateral    CARDIOVASCULAR:    [ ]Regular [ x ]Irregular [ ]Tachy  [ ]Fan [ ]Murmur [ x ]Other S1/S2 audible  GASTROINTESTINAL:  [ x ]Soft  [ ]Distended   [ x ]+BS  [x ]Non tender [ ]Tender  [ ]PEG [ ]OGT/ NGT   Last BM:  2/15/20 (s/p Dulcolax suppositories)  GENITOURINARY:  [ ]Normal [ x ] Incontinent   [ ]Oliguria/Anuria   [x ]Michael  MUSCULOSKELETAL:   [ ]Normal   [ ]Weakness [ x ]Bed/Wheelchair bound [ x]Edema-right upper and lower extremities  NEUROLOGIC:   [ ]No focal deficits  [ x ] Cognitive impairment  [ x ] Dysphagia [ ]Dysarthria [ x ] Paresis-right side [ ]Other   SKIN:  ecchymoses  [ x ]Normal  [ ]Rash     [ ]Pressure ulcer(s)  [ ]y [ ]n  Present on admission      CRITICAL CARE:  [ ] Shock Present  [ ]Septic [ ]Cardiogenic [ ]Neurologic [ ]Hypovolemic  [ ]  Vasopressors [ ]  Inotropes   [ ] Respiratory failure present [ ] Mechanical Ventilation [ ] Non-invasive ventilatory support [ ] High-Flow  [ ] Acute  [ ] Chronic [ ] Hypoxic  [ ] Hypercarbic [ ] Other  [x ] Other organ failure brain    LABS: None new.     RADIOLOGY & ADDITIONAL STUDIES: None new.     PROTEIN CALORIE MALNUTRITION: [ ] mild [ ] moderate [ ] severe  [ ] underweight [ ] morbid obesity    https://www.andeal.org/vault/2440/web/files/ONC/Table_Clinical%20Characteristics%20to%20Document%20Malnutrition-White%20JV%20et%20al%988921.pdf    Height (cm): 167.6 (02-11-20 @ 01:18), 160.02 (02-10-20 @ 16:50)  Weight (kg): 67.6 (02-11-20 @ 01:18), 71 (02-10-20 @ 16:50)  BMI (kg/m2): 24.1 (02-11-20 @ 01:18), 27.7 (02-10-20 @ 16:50)    [ ] PPSV2 < or = 30% [ ] significant weight loss [ ] poor nutritional intake [ ] anasarca Albumin, Serum: 4.1 g/dL (02-10-20 @ 17:46)    Artificial Nutrition [ ]     REFERRALS:   [ ]Chaplaincy  [x] Hospice  [ ]Child Life  [ ]Social Work  [ ]Case management [ ]Holistic Therapy [ ] Physical Therapy [ ] Dietary   Goals of Care Document: GAP TEAM PALLIATIVE CARE UNIT PROGRESS NOTE:      [  ] Patient on hospice program.    INDICATION FOR PALLIATIVE CARE UNIT SERVICES:  (Transfer from  [facility] ) In the PCU for symptom management s/p compassionate extubation in setting of  L MCA infarction    INTERVAL HPI/OVERNIGHT EVENTS: No prn use of medication in the past 24 hours. Remains on Morphine gtt @ 2 mg/hour and Haldol 1 mg IV q 6 ATC. Hypotensive this morning. Remains unresponsive.    DNR on chart: Yes    Allergies    No Known Allergies    Intolerances    MEDICATIONS  (STANDING):  clocortolone 0.1% 1 Application(s) 1 Application(s) Topical two times a day  haloperidol    Injectable 1 milliGRAM(s) IV Push every 6 hours  morphine  Infusion 2 mG/Hr (2 mL/Hr) IV Continuous <Continuous>  sodium chloride 0.9%. 1000 milliLiter(s) (10 mL/Hr) IV Continuous <Continuous>    MEDICATIONS  (PRN):  acetaminophen  Suppository .. 650 milliGRAM(s) Rectal every 6 hours PRN Temp greater or equal to 38C (100.4F)  bisacodyl Suppository 10 milliGRAM(s) Rectal daily PRN Constipation  glycopyrrolate Injectable 0.4 milliGRAM(s) IV Push every 6 hours PRN Secretions  haloperidol    Injectable 1 milliGRAM(s) IV Push every 2 hours PRN agitation  LORazepam   Injectable 2 milliGRAM(s) IV Push every 1 hour PRN Agitation/ Agitation  morphine  - Injectable 4 milliGRAM(s) IV Push every 1 hour PRN Pain  morphine  - Injectable 4 milliGRAM(s) IV Push every 1 hour PRN Dyspnea      ITEMS UNCHECKED ARE NOT PRESENT    PRESENT SYMPTOMS: [x]Unable to obtain due to poor mentation from L MCA CVA  Source if other than patient:  [ ]Family   [ ]Team     Pain: [ ] yes [x] no  QOL impact -   Location -                    Aggravating factors -  Quality -  Radiation -  Timing-  Severity (0-10 scale):  Minimal acceptable level (0-10 scale):     Dyspnea:                           [ ]Mild [ ]Moderate [ ]Severe  Anxiety:                             [ ]Mild [ ]Moderate [ ]Severe  Fatigue:                             [ ]Mild [ ]Moderate [ ]Severe  Nausea:                             [ ]Mild [ ]Moderate [ ]Severe  Loss of appetite:              [ ]Mild [ ]Moderate [ ]Severe  Constipation:                    [ ]Mild [ ]Moderate [ ]Severe    PAINAD Score: 0    [email or web address] (Ctrl +  left click to view)  		  Other Symptoms:  [ ]All other review of systems negative     Palliative Performance Status Version 2:  10%         http://npcrc.org/files/news/palliative_performance_scale_ppsv2.pdf  PHYSICAL EXAM:  Vital Signs Last 24 Hrs  T(C): 36.7 (28 Feb 2020 09:33), Max: 36.7 (28 Feb 2020 09:33)  T(F): 98.1 (28 Feb 2020 09:33), Max: 98.1 (28 Feb 2020 09:33)  HR: 112 (28 Feb 2020 09:33) (112 - 112)  BP: 64/43 (28 Feb 2020 09:33) (64/43 - 64/43)  BP(mean): --  RR: 18 (28 Feb 2020 09:33) (18 - 18)  SpO2: 90% (28 Feb 2020 09:33) (90% - 90%)    24 Feb 2020 07:01  -  25 Feb 2020 07:00  --------------------------------------------------------  IN: 0 mL / OUT: 200 mL / NET: -200 mL    GENERAL:  [ ] Anxiety  [ ] Delirium [  ] Agitation  [ ] Other  HEENT:  [ x ]Normal   [ ]Dry mouth   [ ]ET Tube/Trach  [ ]Oral lesions  PULMONARY:   [ ]Clear [ ]Tachypnea  [ ]Audible excessive secretions   [ x ]Rhonchi        [ ]Right [ ]Left [x ]Bilateral  [ ]Crackles        [ ]Right [ ]Left [ ]Bilateral  [ ]Wheezing     [ ]Right [ ]Left [ ]Bilateral  [ ]Diminshed BS [ ]Right [ ]Left [ ]Bilateral    CARDIOVASCULAR:    [ ]Regular [ x ]Irregular [ ]Tachy  [ ]Fan [ ]Murmur [ x ]Other S1/S2 audible  GASTROINTESTINAL:  [ x ]Soft  [ ]Distended   [ x ]+BS  [x ]Non tender [ ]Tender  [ ]PEG [ ]OGT/ NGT   Last BM:  2/15/20 (s/p Dulcolax suppositories)  GENITOURINARY:  [ ]Normal [ x ] Incontinent   [ ]Oliguria/Anuria   [x ]Michael  MUSCULOSKELETAL:   [ ]Normal   [ ]Weakness [ x ]Bed/Wheelchair bound [ x]Edema-right upper and lower extremities  NEUROLOGIC:   [ ]No focal deficits  [ x ] Cognitive impairment  [ x ] Dysphagia [ ]Dysarthria [ x ] Paresis-right side [ ]Other   SKIN:  ecchymoses  [ x ]Normal  [ ]Rash     [ ]Pressure ulcer(s)  [ ]y [ ]n  Present on admission      CRITICAL CARE:  [ ] Shock Present  [ ]Septic [ ]Cardiogenic [ ]Neurologic [ ]Hypovolemic  [ ]  Vasopressors [ ]  Inotropes   [ ] Respiratory failure present [ ] Mechanical Ventilation [ ] Non-invasive ventilatory support [ ] High-Flow  [ ] Acute  [ ] Chronic [ ] Hypoxic  [ ] Hypercarbic [ ] Other  [x ] Other organ failure brain    LABS: None new.     RADIOLOGY & ADDITIONAL STUDIES: None new.     PROTEIN CALORIE MALNUTRITION: [ ] mild [ ] moderate [ ] severe  [ ] underweight [ ] morbid obesity    https://www.andeal.org/vault/2440/web/files/ONC/Table_Clinical%20Characteristics%20to%20Document%20Malnutrition-White%20JV%20et%20al%719540.pdf    Height (cm): 167.6 (02-11-20 @ 01:18), 160.02 (02-10-20 @ 16:50)  Weight (kg): 67.6 (02-11-20 @ 01:18), 71 (02-10-20 @ 16:50)  BMI (kg/m2): 24.1 (02-11-20 @ 01:18), 27.7 (02-10-20 @ 16:50)    [ ] PPSV2 < or = 30% [ ] significant weight loss [ ] poor nutritional intake [ ] anasarca Albumin, Serum: 4.1 g/dL (02-10-20 @ 17:46)    Artificial Nutrition [ ]     REFERRALS:   [ ]Chaplaincy  [x] Hospice  [ ]Child Life  [x ]Social Work  [ ]Case management [ ]Holistic Therapy [ ] Physical Therapy [ ] Dietary   Goals of Care Document:

## 2020-02-29 PROCEDURE — 82947 ASSAY GLUCOSE BLOOD QUANT: CPT

## 2020-02-29 PROCEDURE — 82803 BLOOD GASES ANY COMBINATION: CPT

## 2020-02-29 PROCEDURE — 94002 VENT MGMT INPAT INIT DAY: CPT

## 2020-02-29 PROCEDURE — 71045 X-RAY EXAM CHEST 1 VIEW: CPT

## 2020-02-29 PROCEDURE — 84100 ASSAY OF PHOSPHORUS: CPT

## 2020-02-29 PROCEDURE — 84132 ASSAY OF SERUM POTASSIUM: CPT

## 2020-02-29 PROCEDURE — 81001 URINALYSIS AUTO W/SCOPE: CPT

## 2020-02-29 PROCEDURE — 87086 URINE CULTURE/COLONY COUNT: CPT

## 2020-02-29 PROCEDURE — 73120 X-RAY EXAM OF HAND: CPT

## 2020-02-29 PROCEDURE — 87040 BLOOD CULTURE FOR BACTERIA: CPT

## 2020-02-29 PROCEDURE — 86900 BLOOD TYPING SEROLOGIC ABO: CPT

## 2020-02-29 PROCEDURE — 80061 LIPID PANEL: CPT

## 2020-02-29 PROCEDURE — 95816 EEG AWAKE AND DROWSY: CPT

## 2020-02-29 PROCEDURE — 86850 RBC ANTIBODY SCREEN: CPT

## 2020-02-29 PROCEDURE — 84484 ASSAY OF TROPONIN QUANT: CPT

## 2020-02-29 PROCEDURE — 87070 CULTURE OTHR SPECIMN AEROBIC: CPT

## 2020-02-29 PROCEDURE — 93005 ELECTROCARDIOGRAM TRACING: CPT

## 2020-02-29 PROCEDURE — 84295 ASSAY OF SERUM SODIUM: CPT

## 2020-02-29 PROCEDURE — 83605 ASSAY OF LACTIC ACID: CPT

## 2020-02-29 PROCEDURE — 99291 CRITICAL CARE FIRST HOUR: CPT

## 2020-02-29 PROCEDURE — 94003 VENT MGMT INPAT SUBQ DAY: CPT

## 2020-02-29 PROCEDURE — 95714 VEEG EA 12-26 HR UNMNTR: CPT

## 2020-02-29 PROCEDURE — 82550 ASSAY OF CK (CPK): CPT

## 2020-02-29 PROCEDURE — 82435 ASSAY OF BLOOD CHLORIDE: CPT

## 2020-02-29 PROCEDURE — 87186 SC STD MICRODIL/AGAR DIL: CPT

## 2020-02-29 PROCEDURE — 85027 COMPLETE CBC AUTOMATED: CPT

## 2020-02-29 PROCEDURE — 84145 PROCALCITONIN (PCT): CPT

## 2020-02-29 PROCEDURE — 85014 HEMATOCRIT: CPT

## 2020-02-29 PROCEDURE — 83735 ASSAY OF MAGNESIUM: CPT

## 2020-02-29 PROCEDURE — 94799 UNLISTED PULMONARY SVC/PX: CPT

## 2020-02-29 PROCEDURE — 86901 BLOOD TYPING SEROLOGIC RH(D): CPT

## 2020-02-29 PROCEDURE — 83036 HEMOGLOBIN GLYCOSYLATED A1C: CPT

## 2020-02-29 PROCEDURE — 80048 BASIC METABOLIC PNL TOTAL CA: CPT

## 2020-02-29 PROCEDURE — 82962 GLUCOSE BLOOD TEST: CPT

## 2020-02-29 PROCEDURE — 97760 ORTHOTIC MGMT&TRAING 1ST ENC: CPT

## 2020-02-29 PROCEDURE — 70450 CT HEAD/BRAIN W/O DYE: CPT

## 2020-02-29 PROCEDURE — C8929: CPT

## 2020-02-29 PROCEDURE — 82330 ASSAY OF CALCIUM: CPT

## 2020-02-29 PROCEDURE — 73090 X-RAY EXAM OF FOREARM: CPT

## 2020-02-29 RX ADMIN — HALOPERIDOL DECANOATE 1 MILLIGRAM(S): 100 INJECTION INTRAMUSCULAR at 00:27

## 2020-02-29 RX ADMIN — Medication 2 MILLIGRAM(S): at 05:02

## 2020-02-29 RX ADMIN — HALOPERIDOL DECANOATE 1 MILLIGRAM(S): 100 INJECTION INTRAMUSCULAR at 06:12

## 2020-02-29 NOTE — DISCHARGE NOTE FOR THE EXPIRED PATIENT - HOSPITAL COURSE
78 year old female presented with unresponsiveness at home. At [place] she was found to have R hemiparesis and global aphasia, consistent with a L MCA syndrome. Her CT head reveals an acute L MCA infarction. 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 Not a candidate for IV tPA due to out of window.  Patient was transferred to the PCU for compassionate extubation which took place on 20 in the PCU. Patient remained minimally to unresponsive with non-purposeful movements on the left side. Patient  comfortable on 20 with family at bedside.

## 2020-02-29 NOTE — PROVIDER CONTACT NOTE (OTHER) - ACTION/TREATMENT ORDERED:
5mg Lopressor IVP x2 given. 50mg Lopressor PO given. ABG sent and Troponin level sent. PA left bedside. HR maintained in 120-150 range, neuro team called again at 0645, ordered 5mg Cardizem. HR same.
MD made aware, will d/c orders for comfort measures, pt safety maintained, will continue to monitor
Placing orders for apresoline IVP x1
See patient expiration note

## 2020-02-29 NOTE — PROVIDER CONTACT NOTE (OTHER) - ASSESSMENT
No Response to external Stimuli  No spontaneous respirations  No apical Heart rate  Negative papillary response

## 2021-04-14 NOTE — ED ADULT NURSE NOTE - NS ED NURSE AMBULANCES2
[facility]

## 2021-06-09 NOTE — PROGRESS NOTE ADULT - PROBLEM/PLAN-1
Date of admission: 6/4/2021  Date of discharge: 6/9/2021    Principal Diagnosis:  Mood disorder unspecified    Other Diagnosis/Problem List:   Active Hospital Problems    Diagnosis    • Adjustment disorder with depressed mood    • Moderate episode of recurrent major depressive disorder (CMS/HCC)    • Post traumatic stress disorder (PTSD)    Opiate dependence in remission,  on maintenance regimen  Borderline personality disorder    Reason for Admission :  Please see intake psychiatric evaluation for details.     Hospital Course:Jackie Martinez was admitted and restricted to the unit. The key issues addressed during this stay were  suicidal ideation, anxiety disorder symptoms, depression symptoms, psychosocial stressors, personality features and mood symptoms. Patient was treated with scheduled psychotropic medications including Suboxone, Wellbutrin, fluoxetine, gabapentin, Lamotrigine. Patient was prescribed p.r.n. medications. Patient received milieu, individual and group psychotherapy.   Patient 's condition improved . Patient displayed no self-injurious behaviors. Patient displayed no physically aggressive behaviors.  Patient was  compliant with prescribed medication administration. Patient had no adverse treatment effects..Detox: no. Patient was  stable for discharge on 6/9/2021. Lab work was drawn with results shown below.     Condition of Patient on 6/9/2021): Writer reviewed chart, discussed case with team, performed discharge items including medication reconciliation, reviewed/arranged aftercare in consultation/coordination with other staff.     I interviewed Jackie Martinez.  Patient's symptoms were substantially improved.     Feels better.  States family conference went well.  Patient quite insightful.  States she has to make changes to improve relationship with others.  Stable for discharge.  Patient transitioning to Mental Health partial  hospital program..    We reviewed hospital course, safety and recovery plans, and aftercare treatment needs as well as medications, side effects and monitoring and risk issues. Patient was advised/encouraged to follow-up with all aftercare. Advised to refrain from use of alcohol and illicit drugs. Advised to seek help in psychiatric emergency.    VITALS:    Visit Vitals  /63 (BP Location: LUE - Left upper extremity, Patient Position: Standing)   Pulse 77   Temp 98.3 °F (36.8 °C) (Oral)   Resp 18   Ht 5' 3\" (1.6 m)   Wt 70.8 kg   LMP 01/15/2008   SpO2 99%   BMI 27.63 kg/m²       MENTAL STATUS EXAM    General appearance: well-nourished  Grooming: appropriate  Attitude: Cooperative and Pleasant  Speech: fluent  Mood/affect: appropriate  Psychomotor: normal  Thought process: intact  Associations/ Thought content: unremarkable  Perceptual disorders/hallucinations: none  Level of consciousness: alert  Orientation: oriented to person, place, time, and general circumstances  Attention/Concentration: ability to maintain attention  Fund of knowledge/Intelligence: average  Memory: no apparent impairments in short term memory and no apparent impairments in long term memory   Insight: good, as evidenced by patient's insight into her own illness  Judgment: good, as evidenced by engagement in treatment  Suicidal thoughts: no  Homicidal thoughts: no  Language:intact  Gait/Station:normal    Medical History and Physical examination:  Seen by consultant. No significant findings/abnormalities. Please see the admission H & P consultation note.        Discharge Medications:      Summary of your Discharge Medications      Take these Medications      Details   buprenorphine-naLOXone 8-2 MG sublingual film  Commonly known as: SUBOXONE FILM   Place 1 and one-half strips under the tongue daily.  Comment: BRIANNA: [id number]  dX f11.20  Bridge script for 12 days patient has appointment on the 06/15/2021     * buPROPion  MG 24 hr  tablet  Commonly known as: WELLBUTRIN XL   Take 1 tablet by mouth daily. Take 150 mg daily with a 300 mg tablet = 450 mg daily     * buPROPion  MG 24 hr tablet  Commonly known as: WELLBUTRIN XL   Take 1 tablet by mouth daily. Take together with 150 mg tablet for totaly daily dose of 450 mg daily.     FLUoxetine 20 MG capsule  Commonly known as: PROzac   Take 3 capsules by mouth daily.     fluticasone 50 MCG/ACT nasal spray  Commonly known as: FLONASE   Spray 2 sprays in each nostril daily.     * Fluzone Quadrivalent 0.5 ML injection   Generic drug: influenza virus quadrivalent vaccine inactivated (PRESERVATIVE FREE)  Inject 0.5 mLs into the muscle 1 time for 1 dose     * Fluzone Quadrivalent 0.5 ML injection   Generic drug: influenza virus quadrivalent vaccine inactivated (PRESERVATIVE FREE)  Inject into the muscle once as needed. Inject 0.5 mLs into the muscle 1 time for 1 dose     gabapentin 600 MG tablet  Commonly known as: NEURONTIN   Take one and one-HALF tablets (900 mg) by mouth 3 times daily.     ibuprofen 800 MG tablet  Commonly known as: MOTRIN   Take 1 tablet by mouth every 8 hours     lamoTRIgine 25 MG tablet  Commonly known as: LaMICtal  Start taking on: Gracie 10, 2021   Take 2 tablets by mouth daily. Do not start before Gracie 10, 2021.     montelukast 10 MG tablet  Commonly known as: SINGULAIR   TAKE 1 TABLET BY MOUTH EVERY NIGHT     solifenacin 10 MG tablet  Commonly known as: VESICARE   Take 1 tablet by mouth daily.     turmeric 500 MG capsule   Take 500 mg by mouth 2 times daily.     VITAMIN D3 PO   Take 1 tablet by mouth daily.         * This list has 4 medication(s) that are the same as other medications prescribed for you. Read the directions carefully, and ask your doctor or other care provider to review them with you.                 Medication Indications      Medication      Indication   buprenorphine-naLOXone 8-2 MG sublingual film  Commonly known as: SUBOXONE FILM  Place 1 and one-half  strips under the tongue daily.      buPROPion  MG 24 hr tablet  Commonly known as: WELLBUTRIN XL  Take 1 tablet by mouth daily. Take 150 mg daily with a 300 mg tablet = 450 mg daily      buPROPion  MG 24 hr tablet  Commonly known as: WELLBUTRIN XL  Take 1 tablet by mouth daily. Take together with 150 mg tablet for totaly daily dose of 450 mg daily.      FLUoxetine 20 MG capsule  Commonly known as: PROzac  Take 3 capsules by mouth daily.      fluticasone 50 MCG/ACT nasal spray  Commonly known as: FLONASE  Spray 2 sprays in each nostril daily.      Fluzone Quadrivalent 0.5 ML injection  Generic drug: influenza virus quadrivalent vaccine inactivated (PRESERVATIVE FREE)  Inject 0.5 mLs into the muscle 1 time for 1 dose      Fluzone Quadrivalent 0.5 ML injection  Generic drug: influenza virus quadrivalent vaccine inactivated (PRESERVATIVE FREE)  Inject into the muscle once as needed. Inject 0.5 mLs into the muscle 1 time for 1 dose      gabapentin 600 MG tablet  Commonly known as: NEURONTIN  Take one and one-HALF tablets (900 mg) by mouth 3 times daily.      ibuprofen 800 MG tablet  Commonly known as: MOTRIN  Take 1 tablet by mouth every 8 hours      lamoTRIgine 25 MG tablet  Commonly known as: LaMICtal  Take 2 tablets by mouth daily. Do not start before Rgacie 10, 2021.  Start taking on: Gracie 10, 2021      montelukast 10 MG tablet  Commonly known as: SINGULAIR  TAKE 1 TABLET BY MOUTH EVERY NIGHT      solifenacin 10 MG tablet  Commonly known as: VESICARE  Take 1 tablet by mouth daily.      turmeric 500 MG capsule      VITAMIN D3 PO               Risk Status on discharge: Not an acute/imminent risk of harm to self or others.      Is the patient on two or more scheduled antipsychotic agents:  No     Legal:Voluntary      After Care :  follow up with out pt psychiatrist and therapist and follow [facility]  Please see after visit summary for details     Laboratory Tests:    CBC with differential  Lab  DISPLAY PLAN FREE TEXT Results   Component Value Date    WBC 4.1 (L) 06/05/2021    WBC 4.4 07/22/2019    RBC 4.09 06/05/2021    RBC 3.76 (L) 07/22/2019    HGB 12.8 06/05/2021    HGB 11.5 (L) 07/22/2019    HCT 39.7 06/05/2021    HCT 35.0 (L) 07/22/2019     06/05/2021     07/22/2019     02/18/2014    SEG 48 06/05/2021    SEG 45 07/22/2019    PMON 9 06/05/2021    PMON 9 07/22/2019    PEOS 2 06/05/2021    PEOS 5 07/22/2019    PBASO 1 06/05/2021    PBASO 1 07/22/2019    ANEUT 2.0 06/05/2021    ANEUT 2.0 07/22/2019    ALYMS 1.6 06/05/2021    ALYMS 1.7 07/22/2019    ISMAEL 0.4 06/05/2021    ISMAEL 0.4 07/22/2019    AEOS 0.1 06/05/2021    AEOS 0.2 07/22/2019    ABASO 0.0 06/05/2021    ABASO 0.0 07/22/2019       Comprehensive metabolic panel  Lab Results   Component Value Date    SODIUM 143 06/05/2021    POTASSIUM 4.0 06/05/2021    CHLORIDE 109 (H) 06/05/2021    CO2 31 06/05/2021    GLUCOSE 82 06/05/2021    BUN 17 06/05/2021    CREATININE 0.92 06/05/2021    CALCIUM 8.9 06/05/2021    ALBUMIN 3.5 (L) 06/05/2021    MG 2.5 (H) 05/08/2018    BILIRUBIN 0.4 06/05/2021    ALKPT 51 06/05/2021    AST 18 06/05/2021    GPT 23 06/05/2021    PHOS 3.0 09/12/2013     Lab Results   Component Value Date    MG 2.5 (H) 05/08/2018     TSH (mcUnits/mL)   Date Value   06/05/2021 2.587     No results found for this or any previous visit.    POC Breath Alcohol testing result:       Hepatitis serology result:  @[facility][46670.03]@    [facility] Urine Drug Screen Results  Cocaine:                   Negative (06/04/21 2100)  Opiates:                    Negative (06/04/21 2100)  Buprenorphine:          Positive (06/04/21 2100)  Amphetamines:         Negative (06/04/21 2100)  Propoxyphene:            Oxycodone/Oxycontin:   Negative (06/04/21 2100)  Methamphetamine:     Negative (06/04/21 2100)  Barbiturates:                Negative (06/04/21 2100)  Marijuana:              Negative (06/04/21 2100)  Benzodiazepine:      Negative (06/04/21 2100)  Methadone:                     Negative (06/04/21 2100)  Methylenedioxymethamphetamine:  Negative (06/04/21 2100)      No results found for: LITHIUM   Lab Results   Component Value Date    VALP 66 07/31/2018     No results found for: CARBAM    No results found for: CPK  No results found for: HGBA1C    LIPID PANEL  Cholesterol (mg/dL)   Date Value   06/05/2021 187      HDL (mg/dL)   Date Value   06/05/2021 55      Cholesterol/ HDL Ratio (no units)   Date Value   06/05/2021 3.4      Triglycerides (mg/dL)   Date Value   06/05/2021 75      LDL (mg/dL)   Date Value   06/05/2021 117     No results found for this or any previous visit.    Urine Panel  No results for input(s): UOSM, UK, 5UNITR, UCROA, UCL, SILVIA, UKET, USPG, UPROT, UWBC, URBC, UBILI, UPH, UURO, USPG, UBACTR, UTPELC in the last 72 hours.    Invalid input(s): SPGRAVITY    Latest radiology results: No results found.    Time: 30+ minutes of discharge activities.

## 2021-07-28 NOTE — PATIENT PROFILE ADULT - NSPRESCRALCAMT_GEN_A_NUR
August 2, 2021         Patient: Keyla Loo   YOB: 1991           To Whom It May Concern:    Keyla Loo was evaluated in my clinic on 7/15/21 at 12:00 pm. As discussed during her visit she was instructed the following:    No driving or operating heavy machines  Avoid sleep deprivation & Avoid Alcohol  No supervising children alone  No performing medical emergency or medical procedures.  The patient has been counseled to avoid swimming or bathing alone, climbing ladders or roofs or above own height due to risk of serious injury should a seizure occur.   Avoid missing medications, Helmet during biking  Medical compliance with plan discussed and risks of non-compliance reviewed.    Patient expresses understanding of the plan.        Sincerely,         Dileep Hansen MD        CC:   No Recipients                                        
unable to assess pt intubated and sedated

## 2022-08-22 NOTE — CONSULT NOTE ADULT - SUBJECTIVE AND OBJECTIVE BOX
Please advise, thank you.    Patient is a 79y old  Female who presents with a chief complaint of unresponsive (10 Feb 2020 18:03)    HPI: 89 yr old Female who lives alone in [place] and self reliant with PMH of DM2 HTN ,HLD, S/P cholecystectomy hip surg, was last seen by daughter at 4 pm on 2/9, and she talked to mother at 9.30 pm , today pt was not responding and daughter found her unresponsive at 4 pm, and brought in to Ed where unresponsive, eyes closed, not moving any extremities , does not respond to voice commands, slight movement of rt leg ,  NIHSS couldn't be assessed accurately.  CT Head showed -  Acute left MCA distribution infarction with an associated hyperdense left MCA sign. No hemorrhagic transformation.  Not a tPa candidate  - Last well  known is last night. CVA already shown on CT Head    PAST MEDICAL & SURGICAL HISTORY:  Gallbladder polyp  Chronic bronchitis, simple  Dyslipidemia  Diabetes: fbs  104-124  Hypertension  S/P colonoscopy: 2013  S/P cataract extraction, unspecified laterality: bilateral  S/P hysterectomy with oophorectomy: due to myoma 1994  S/P breast biopsy, right: 1985    MEDICATIONS  (STANDING):  dextrose 5%. 1000 milliLiter(s) (50 mL/Hr) IV Continuous <Continuous>  dextrose 50% Injectable 12.5 Gram(s) IV Push once  dextrose 50% Injectable 25 Gram(s) IV Push once  dextrose 50% Injectable 25 Gram(s) IV Push once  heparin  Injectable 5000 Unit(s) SubCutaneous every 12 hours  insulin lispro (HumaLOG) corrective regimen sliding scale   SubCutaneous three times a day before meals  sodium chloride 0.9%. 1000 milliLiter(s) (50 mL/Hr) IV Continuous <Continuous>    MEDICATIONS  (PRN):  acetaminophen  Suppository .. 650 milliGRAM(s) Rectal every 6 hours PRN Temp greater or equal to 38C (100.4F), Mild Pain (1 - 3)  bisacodyl Suppository 10 milliGRAM(s) Rectal daily PRN Constipation  dextrose 40% Gel 15 Gram(s) Oral once PRN Blood Glucose LESS THAN 70 milliGRAM(s)/deciliter  glucagon  Injectable 1 milliGRAM(s) IntraMuscular once PRN Glucose LESS THAN 70 milligrams/deciliter  labetalol Injectable 10 milliGRAM(s) IV Push every 6 hours PRN SBP>190 or DBP>110    Allergies    No Known Allergies    SOCIAL HISTORY:    No h/o Smoking.   No h/o alcohol use.    FAMILY HISTORY:  Family history of colon cancer (Sibling)  Family history of CVA (Mother)  Family history of borderline diabetes mellitus (Father)    REVIEW OF SYSTEMS:    CONSTITUTIONAL: No fever  EYES: No eye pain,   ENMT:  No sinus or throat pain  NECK: No pain or stiffness  RESPIRATORY: No cough, No hemoptysis; No shortness of breath  CARDIOVASCULAR: No acute chest pain, palpitations,  or leg swelling  GASTROINTESTINAL: No abdominal pain. No nausea, vomiting, or hematemesis;  No melena or hematochezia.  GENITOURINARY: No  hematuria, or incontinence  MUSCULOSKELETAL: No joint swelling; No extremity pain  SKIN: No itching, rashes, or lesions   LYMPH NODES: No enlarged glands  NEUROLOGICAL: No headaches, memory loss,   PSYCHIATRIC: No depression, anxiety, mood swings, or difficulty sleeping  ENDOCRINE: No heat or cold intolerance;   HEME/LYMPH: No easy bruising, or bleeding gums  Allergy/Immunology. No medication allergy. No seasonal allergies.    PHYSICAL EXAM:  Vital Signs Last 24 Hrs  T(F): 98.1 (02-10-20 @ 16:50)  HR: 93 (02-10-20 @ 16:50)  BP: 195/88 (02-10-20 @ 16:50)  RR: 16 (02-10-20 @ 16:50)    GENERAL: NAD, well-groomed, well-developed  HEAD:  Atraumatic, Normocephalic  EYES: EOMI, PERRLA, conjunctiva and sclera clear  NECK: Supple, No JVD, thyroid non-palpable    On Neurological Examination:    Mental Status - Responds to pain.    Speech -  Mute    Cranial Nerves - Pupils 3 mm equal reactive to light. Pt has right facial asymmetry.     Motor Exam - Right UE is floppy. Able to keep left arm up. Withdraws left LE more than right    Sensory Exam - Pt withdraws all extremities equally on stimulation.     Gait - Couldn't be tested.     Deep tendon Reflexes - 2 plus all over.    Coordination - Unable to do     Neck Supple -  Yes.    LABS:                        14.5   14.45 )-----------( 206      ( 10 Feb 2020 17:46 )             45.5     02-10    141  |  104  |  24<H>  ----------------------------<  210<H>  3.6   |  23  |  0.80    Ca    9.0      10 Feb 2020 17:46    TPro  8.0  /  Alb  4.1  /  TBili  0.6  /  DBili  x   /  AST  31  /  ALT  50  /  AlkPhos  84  02-10    PT/INR - ( 10 Feb 2020 17:46 )   PT: 11.7 sec;   INR: 1.05 ratio      PTT - ( 10 Feb 2020 17:46 )  PTT:28.2 sec    RADIOLOGY & ADDITIONAL STUDIES:    < from: CT Brain Stroke Protocol (02.10.20 @ 16:58) >  IMPRESSION: Acute left MCA distribution infarction with an associated hyperdense left MCA sign. No hemorrhagic transformation.    < end of copied text >

## 2023-10-18 NOTE — ED PROVIDER NOTE - CRITICAL CARE PROVIDED
coffee
direct patient care (not related to procedure)/consultation with other physicians/documentation/interpretation of diagnostic studies/consult w/ pt's family directly relating to pts condition/additional history taking

## 2025-06-22 NOTE — PROGRESS NOTE ADULT - PROBLEM SELECTOR PROBLEM 2
[facility] Hospitalist History and Physical        PCP: Yulisa Vieira MD  Notified via Epic/PS if available     Chief Complaint:     Chief Complaint   Patient presents with    Constipation       History Of Present Illness:    66 year old female presenting with PMHx including DM2, lung nodules, TE, diastolic CHF, HTN, HLD, ESRD on HD (MWF), diabetic neuropathy, hyperuricemia, hx of tobacco use, presenting to the ER with left lower quadrant pain    Endorses LLQ abdominal pain starts soon after consuming food she feels like she needs to defecate but is unable to pass a significant amount of stool.  Also with history of chronic constipation patient has been taking stool softeners and laxatives at home and notes have usually been liquidy.  Patient endorses she is afraid to eat because of the pain.  Also mention epigastric burning sensation.  Has not tried Protonix or famotidine.  Endorses smoking couple cigarettes daily.  Denies alcohol, recreational drug use months ago use the bathroom right now she just had breakfast recently.    Daughter present at bedside.After a long discussion, the patient's family indicated understanding of the diagnosis and agreed with the plan of care.       14 point Review of Systems is negative except for as noted above.      Past Medical History  Past Medical History:   Diagnosis Date    Anemia     Cholelithiasis     Chronic kidney disease     Congestive cardiac failure  (CMD)     Diabetes mellitus  (CMD)     Diverticulitis     Diverticulosis     Essential (primary) hypertension     High cholesterol     Sleep apnea        Surgical History  Past Surgical History:   Procedure Laterality Date    Av fistula placement Left     Cardiac catherization      Colonoscopy          Social History  Social History     Tobacco Use    Smoking status: Every Day     Current packs/day: 1.00     Average packs/day: 1 pack/day for 46.5 years (46.5 ttl pk-yrs)     Types: Cigarettes     Start date: 1979    Smokeless  tobacco: Never   Vaping Use    Vaping status: never used   Substance Use Topics    Alcohol use: Yes     Comment: Social    Drug use: No     Patient denies other alcohol, tobacco, or illicit drug use except for as noted above.    Family History    Family History   Problem Relation Age of Onset    Heart disease Father     Diabetes Maternal Grandmother      Patient denies/does not have knowledge / is not aware / of any other chronic conditions in mother father or direct siblings.    Allergies  ALLERGIES:  Patient has no known allergies.  Patient denies/does not have knowledge of any other allergies    Home Medications  (Not in a hospital admission)    Reviewed with patient.     Inpatient Medications  Current Facility-Administered Medications   Medication Dose Route Frequency Provider Last Rate Last Admin    acetaminophen (TYLENOL) tablet 650 mg  650 mg Oral Q4H PRN Ali, Magi, DO        Or    acetaminophen (TYLENOL) suppository 650 mg  650 mg Rectal Q4H PRN Ali, Magi, DO        ondansetron (ZOFRAN ODT) disintegrating tablet 4 mg  4 mg Oral Q6H PRN Ali, Magi, DO        Or    ondansetron (ZOFRAN) injection 4 mg  4 mg Intravenous Q6H PRN Ali, Magi, DO        docusate sodium-sennosides (SENOKOT S) 50-8.6 MG 2 tablet  2 tablet Oral BID PRN Ali, Magi, DO        melatonin tablet 6 mg  6 mg Oral Nightly PRN Ali, Magi, DO        Potassium Standard Replacement Protocol (Levels 3.5 and lower)   Does not apply See Admin Instructions Ali, Magi, DO        Magnesium Standard Replacement Protocol   Does not apply See Admin Instructions Ali, Magi, DO        Phosphorus Standard Replacement Protocol   Does not apply See Admin Instructions Ali, Magi, DO        sodium chloride 0.9 % injection 10 mL  10 mL Intravenous PRN Ali, Magi, DO        sodium chloride 0.9 % injection 2 mL  2 mL Intracatheter 2 times per day Ali, Magi, DO        heparin (porcine) injection 5,000 Units  5,000 Units Subcutaneous 3 times per day Ali, Magi, DO         calcium acetate gelcap (PHOSLO) capsule 1,334 mg  1,334 mg Oral TID WC Ali, Magi, DO   1,334 mg at 06/22/25 1155    bumetanide (BUMEX) tablet 0.5 mg  0.5 mg Oral BID Ali, Magi, DO        carvedilol (COREG) tablet 6.25 mg  6.25 mg Oral BID WC Ali, Magi, DO   6.25 mg at 06/22/25 1006    dextrose 50 % injection 25 g  25 g Intravenous PRN Ali, Magi, DO        dextrose 50 % injection 12.5 g  12.5 g Intravenous PRN Ali, Magi, DO        glucagon (GLUCAGEN) injection 1 mg  1 mg Intramuscular PRN Ali, Magi, DO        dextrose (GLUTOSE) 40 % gel 15 g  15 g Oral PRN Ali, Magi, DO        dextrose (GLUTOSE) 40 % gel 30 g  30 g Oral PRN Ali, Magi, DO        insulin lispro (ADMELOG,HumaLOG) - Correction Dose   Subcutaneous TID WC Ali, Magi, DO        insulin lispro (ADMELOG,HumaLOG) - Correction Dose   Subcutaneous Nightly Ali, Magi, DO        polyethylene glycol (MIRALAX) packet 17 g  17 g Oral BID Murphy Lora MD        psyllium (METAMUCIL) packet 1 packet  1 packet Oral BID Murphy Lora MD        bisacodyl (DULCOLAX) suppository 10 mg  10 mg Rectal Daily Murphy Lora MD        dicyclomine (BENTYL) capsule 10 mg  10 mg Oral 4x Daily PRN Murphy Lora MD   10 mg at 06/22/25 1155    atorvastatin (LIPITOR) tablet 40 mg  40 mg Oral QHS Jamin Blount MD        cinacalcet (SENSIPAR) tablet 30 mg  30 mg Oral Daily Jamin Blount MD        [Held by provider] lisinopril (ZESTRIL) tablet 5 mg  5 mg Oral Daily Jamin Blount MD        fluticasone-umeclidin-vilanterol (TRELEGY ELLIPTA) 100-62.5-25 MCG/ACT inhaler 1 puff  1 puff Inhalation Daily Resp Jamin Blount MD         Current Outpatient Medications   Medication Sig Dispense Refill    Insulin Glargine-yfgn 100 UNIT/ML Solution Pen-injector ADMINISTER 15 UNITS UNDER THE SKIN TWICE DAILY      carvedilol (COREG) 6.25 MG tablet Take 1 tablet by mouth in the morning and 1 tablet in the evening. Take with meals. 180 tablet 1    lisinopril (ZESTRIL) 2.5 MG tablet  Take 2 tablets by mouth daily. 90 tablet 3    bumetanide (BUMEX) 0.5 MG tablet Take 1 tablet by mouth 2 times daily. 180 tablet 1    triamcinolone (ARISTOCORT) 0.1 % ointment Apply 1 cm2 topically in the morning and 1 cm2 in the evening. Back of Rt ear thin film (Patient taking differently: Apply 1 cm topically in the morning and 1 cm in the evening. Back of Rt ear thin film) 30 g 1    Lancets (OneTouch Delica Plus Xnpeal00B) Misc 1 each daily. 100 each 1    Vitamin D, Ergocalciferol, 1.25 mg (50,000 units) capsule TAKE 1 CAPSULE BY MOUTH 1 TIME A WEEK 12 capsule 5    bisacodyl (Dulcolax) 5 MG EC tablet Take 2 tablets by mouth daily as needed for Constipation. For constipation 30 tablet 1    blood glucose test strip Test blood sugar 3 times daily. Diagnosis: type 3 DM. Meter: OneTouch Verio test strip 100 strip 3    cyclobenzaprine (FLEXERIL) 5 MG tablet Take 1 tablet by mouth 3 times daily as needed for Muscle spasms. (Patient not taking: Reported on 6/22/2025) 30 tablet 0    diclofenac (VOLTAREN) 1 % gel APPLY TOPICALLY TO THE AFFECTED AREA 1 TIME EACH DAY      acetaminophen (TYLENOL) 500 MG tablet Take 500 mg by mouth daily as needed for Pain.      Glycerin-Hypromellose- (DRY EYE RELIEF DROPS OP) Place 1-2 drops into both eyes daily as needed (dry eyes).      atorvastatin (LIPITOR) 40 MG tablet TAKE 1 TABLET BY MOUTH EVERY NIGHT AT BEDTIME 180 tablet 2    TRUEplus 5-Bevel Pen Needles 31G X 8 MM Misc USE TO INJECT INSULIN FOUR TIMES DAILY 100 each 3    Trelegy Ellipta 100-62.5-25 MCG/ACT inhaler INHALE 1 PUFF BY MOUTH DAILY. RINSE MOUTH AFTER USE      calcium acetate gelcap (PHOSLO) 667 MG capsule Take 1,334 mg by mouth 3 times daily (with meals).      cinacalcet (SENSIPAR) 30 MG tablet Take 30 mg by mouth daily.      albuterol 108 (90 Base) MCG/ACT inhaler Inhale 2 puffs into the lungs every 4 hours as needed for Shortness of Breath or Wheezing. 1 Inhaler 3     All inpatient medications, side effects and  potential interactions discussed.    In/Out  No intake or output data in the 24 hours ending 06/22/25 1208     Physical Exam  Visit Vitals  /78   Pulse 73   Temp 97.9 °F (36.6 °C)   Resp 19   SpO2 97%       Physical Exam:   General: No acute distress. Alert and oriented.  ENT: no scleral icterus, no conjunctival erythema.   Cardio: S1, S2, no murmur, rub, gallop or thrills noted.   Pulm: No chest wall tenderness. Lungs clear to auscultation bilaterally, no wheeze or rhonchi noted.   GI: Soft, non-distended, mild left lower quadrant tenderness  : No suprapubic tenderness.  Ext: Warm. No upper or lower extremity edema noted.  MSK: Grossly normal strength both upper and lower extremities.  Skin: No jaundice. No abnormal bruising or discoloration noted.   Psych: Appropriate mood and affect. Good Insight and Judgment  Neuro: Pt appropriately follows commands. Grossly normal motor and sensory function.      Labs     Recent Labs   Lab 06/22/25  0646 06/22/25  0019   SODIUM 133* 134*   POTASSIUM 3.5 3.7   CHLORIDE 96* 94*   CO2 30 34*   BUN 14 14   CREATININE 6.28* 5.88*   GLUCOSE 72 100*   ALBUMIN 3.3* 3.4   AST 21 25   BILIRUBIN 0.6 0.5       Imaging    CT ABDOMEN PELVIS WO CONTRAST   Final Result   1.  Cholelithiasis. No gallbladder wall thickening. No pericholecystic    edema.   2.  Diverticulosis, without acute diverticulitis.  No small bowel    obstruction.  No free air.            Electronically signed by Jordan Elizabeth MD on 06-22-25 at 0452      XR ABDOMEN 1 VIEW   Final Result   No evidence of bowel obstruction.                  Electronically Signed by: GRICELDA RUIZ MD    Signed on: 6/22/2025 1:44 AM    Workstation ID: [id number]          Microbiology Results       None            I personally reviewed the patient's imaging, radiology and report(s).     LAST ECHO/ECHO STRESS:  No valid procedures specified.    Echocardiogram Results Personally reviewed by me.     Assessment/Plan:  All the following  conditions PRESENT ON ADMISSION.     66 year old female presenting with PMHx including DM2, lung nodules, TE, diastolic CHF, HTN, HLD, ESRD on HD (MWF), diabetic neuropathy, hyperuricemia, hx of tobacco use, presenting to the ER with left lower quadrant pain     #LLQ abdominal pain suspected secondary to IBS-C  -appreciate GI recommendations: Metamucil, MiraLAX twice daily, bisacodyl suppository, Bentyl as needed    #Postprandial epigastric burning suspected secondary to gastritis/acid reflux  - Appreciate GI recommendations: Protonix IV twice daily.  May consider endoscopy  - Check H. pylori antigen  - Outpatient GI follow-up    #Hypoglycemia  - Hypoglycemia protocol    #Pulmonary nodules  #Pulmonary hypertension- noted on cardiac cath in 2016  - CT chest in Feb 2024 noting pulmonary nodules  - outpt f/u w/ PCP & pulm for repeat outpt imaging & surveillance     #ESRD on HD- nephrology  #Hypertensive heart & kidney disease w/ chronic diastolic CHF & ESRD on HD  #Chronic diastolic CHF-resume medications as tolerated  #Hyperlipidemia-statin   #IDDM 2-SSI   #COPD-resume home inhalers  TE on CPAP- cont nightly CPAP, monitor  Obesity - encouraged healthy eating/weight loss efforts         Baseline Activity: Ambulates Independently  Destination:  Home  Communication: With patient, nurse, consultants    Code Status: Review History  DVT Prophylaxis:    Current Active Medications for DVT Prophylaxis (From admission, onward)           Stop     heparin (porcine) injection 5,000 Units  5,000 Units,   Subcutaneous,   3 times per day         --                  Nutrition: Consistent Carb Moderate (45-75 Gm/Meal); Yes, Medical Nutrition Management by Rd (Registered Dietitian) Diet        I spent 75 MINS ON THIS PATIENTS CARE TODAY. This includes the following: Reviewed all vitals, medications, new orders, I/O, labs, micro, radiology, nurses notes, pertinent consultant notes which are reflected in assessment and plan.This does  Atrial fibrillation not include time spent on other items of care such as smoking cessation counseling, prolonged care time, and or advanced care planning if applicable.     Discussed meaning of DNR/DNI status vs Full Code status as well as plan for advanced directive vs. Surrogate and what this entails. Discussed CPR, intubation, electrical cardioversion, vasopressors and antiarrhythmics. Also made clear that code status can change at any time and is not set in stone. Goals of care, diagnosis and natural disease progression and expectations discussed. Patient/family understands. All questions answered.  Advanced care planning care time 16 minutes.       [facility] Hospitalist  Available via perfect serve